# Patient Record
Sex: FEMALE | Race: WHITE | Employment: OTHER | URBAN - METROPOLITAN AREA
[De-identification: names, ages, dates, MRNs, and addresses within clinical notes are randomized per-mention and may not be internally consistent; named-entity substitution may affect disease eponyms.]

---

## 2017-01-03 ENCOUNTER — HOSPITAL ENCOUNTER (OUTPATIENT)
Dept: OTHER | Age: 72
Discharge: HOME | End: 2017-01-03
Attending: FAMILY MEDICINE | Admitting: FAMILY MEDICINE

## 2017-01-03 ENCOUNTER — TELEPHONE (OUTPATIENT)
Dept: FAMILY MEDICINE CLINIC | Age: 72
End: 2017-01-03

## 2017-01-03 ENCOUNTER — OFFICE VISIT (OUTPATIENT)
Dept: FAMILY MEDICINE CLINIC | Age: 72
End: 2017-01-03

## 2017-01-03 VITALS
WEIGHT: 137 LBS | HEIGHT: 64 IN | BODY MASS INDEX: 23.39 KG/M2 | SYSTOLIC BLOOD PRESSURE: 100 MMHG | DIASTOLIC BLOOD PRESSURE: 66 MMHG | TEMPERATURE: 97.7 F

## 2017-01-03 DIAGNOSIS — L65.9 HAIR THINNING: ICD-10-CM

## 2017-01-03 DIAGNOSIS — M19.019 SHOULDER ARTHRITIS: Primary | ICD-10-CM

## 2017-01-03 DIAGNOSIS — E78.2 MIXED HYPERLIPIDEMIA: ICD-10-CM

## 2017-01-03 DIAGNOSIS — Z11.59 NEED FOR HEPATITIS C SCREENING TEST: ICD-10-CM

## 2017-01-03 DIAGNOSIS — M19.019 SHOULDER ARTHRITIS: ICD-10-CM

## 2017-01-03 DIAGNOSIS — I10 ESSENTIAL HYPERTENSION: ICD-10-CM

## 2017-01-03 PROCEDURE — 99214 OFFICE O/P EST MOD 30 MIN: CPT | Performed by: FAMILY MEDICINE

## 2017-01-03 ASSESSMENT — ENCOUNTER SYMPTOMS
EYES NEGATIVE: 1
GASTROINTESTINAL NEGATIVE: 1
RESPIRATORY NEGATIVE: 1

## 2017-01-04 LAB
DHEAS (DHEA SULFATE): 37 UG/DL (ref 13–130)
FOLATE: 6.2 NG/ML (ref 4.78–24.2)
HEPATITIS C ANTIBODY INTERPRETATION: NORMAL
VITAMIN B-12: 1116 PG/ML (ref 211–911)

## 2017-01-23 DIAGNOSIS — E78.2 MIXED HYPERLIPIDEMIA: ICD-10-CM

## 2017-01-23 RX ORDER — FENOFIBRATE 160 MG/1
160 TABLET ORAL DAILY
Qty: 90 TABLET | Refills: 1 | Status: SHIPPED | OUTPATIENT
Start: 2017-01-23 | End: 2017-08-21 | Stop reason: SDUPTHER

## 2017-01-23 RX ORDER — CANAGLIFLOZIN 300 MG/1
TABLET, FILM COATED ORAL
Qty: 90 TABLET | Refills: 0 | Status: SHIPPED | OUTPATIENT
Start: 2017-01-23 | End: 2017-10-13 | Stop reason: ALTCHOICE

## 2017-01-23 RX ORDER — METFORMIN HYDROCHLORIDE 500 MG/1
500 TABLET, EXTENDED RELEASE ORAL 2 TIMES DAILY
Qty: 180 TABLET | Refills: 1 | Status: SHIPPED | OUTPATIENT
Start: 2017-01-23 | End: 2017-08-21 | Stop reason: SDUPTHER

## 2017-03-08 ENCOUNTER — HOSPITAL ENCOUNTER (OUTPATIENT)
Dept: MAMMOGRAPHY | Age: 72
Discharge: HOME | End: 2017-03-08
Attending: FAMILY MEDICINE | Admitting: FAMILY MEDICINE

## 2017-03-08 DIAGNOSIS — Z12.31 VISIT FOR SCREENING MAMMOGRAM: ICD-10-CM

## 2017-03-09 ENCOUNTER — TELEPHONE (OUTPATIENT)
Dept: FAMILY MEDICINE CLINIC | Age: 72
End: 2017-03-09

## 2017-03-09 DIAGNOSIS — R92.8 ABNORMAL MAMMOGRAM OF RIGHT BREAST: Primary | ICD-10-CM

## 2017-03-13 ENCOUNTER — HOSPITAL ENCOUNTER (OUTPATIENT)
Dept: MAMMOGRAPHY | Age: 72
Discharge: HOME | End: 2017-03-13
Attending: FAMILY MEDICINE | Admitting: FAMILY MEDICINE

## 2017-03-13 DIAGNOSIS — R92.8 ABNORMAL MAMMOGRAM OF RIGHT BREAST: ICD-10-CM

## 2017-03-31 ENCOUNTER — TELEPHONE (OUTPATIENT)
Dept: FAMILY MEDICINE CLINIC | Age: 72
End: 2017-03-31

## 2017-03-31 DIAGNOSIS — I10 ESSENTIAL HYPERTENSION: ICD-10-CM

## 2017-04-03 DIAGNOSIS — I10 ESSENTIAL HYPERTENSION: ICD-10-CM

## 2017-04-04 ENCOUNTER — OFFICE VISIT (OUTPATIENT)
Dept: FAMILY MEDICINE CLINIC | Age: 72
End: 2017-04-04

## 2017-04-04 VITALS
DIASTOLIC BLOOD PRESSURE: 58 MMHG | WEIGHT: 136.4 LBS | HEIGHT: 64 IN | OXYGEN SATURATION: 98 % | TEMPERATURE: 97.7 F | BODY MASS INDEX: 23.29 KG/M2 | SYSTOLIC BLOOD PRESSURE: 100 MMHG | HEART RATE: 86 BPM

## 2017-04-04 DIAGNOSIS — I10 ESSENTIAL HYPERTENSION: ICD-10-CM

## 2017-04-04 DIAGNOSIS — E78.2 MIXED HYPERLIPIDEMIA: Primary | ICD-10-CM

## 2017-04-04 LAB
A/G RATIO: 2 (ref 1.1–2.2)
ALBUMIN SERPL-MCNC: 4.2 G/DL (ref 3.4–5)
ALP BLD-CCNC: 49 U/L (ref 40–129)
ALT SERPL-CCNC: 19 U/L (ref 10–40)
ANION GAP SERPL CALCULATED.3IONS-SCNC: 18 MMOL/L (ref 3–16)
AST SERPL-CCNC: 20 U/L (ref 15–37)
BILIRUB SERPL-MCNC: <0.2 MG/DL (ref 0–1)
BILIRUBIN, POC: NORMAL
BLOOD URINE, POC: NORMAL
BUN BLDV-MCNC: 25 MG/DL (ref 7–20)
CALCIUM SERPL-MCNC: 9.6 MG/DL (ref 8.3–10.6)
CHLORIDE BLD-SCNC: 105 MMOL/L (ref 99–110)
CHOLESTEROL, TOTAL: 200 MG/DL (ref 0–199)
CLARITY, POC: CLEAR
CO2: 21 MMOL/L (ref 21–32)
COLOR, POC: YELLOW
CREAT SERPL-MCNC: 0.9 MG/DL (ref 0.6–1.2)
CREATININE URINE POCT: NORMAL
ESTIMATED AVERAGE GLUCOSE: 211.6 MG/DL
GFR AFRICAN AMERICAN: >60
GFR NON-AFRICAN AMERICAN: >60
GLOBULIN: 2.1 G/DL
GLUCOSE BLD-MCNC: 182 MG/DL (ref 70–99)
GLUCOSE URINE, POC: NORMAL
HBA1C MFR BLD: 9 %
HDLC SERPL-MCNC: 48 MG/DL (ref 40–60)
KETONES, POC: NORMAL
LDL CHOLESTEROL CALCULATED: 119 MG/DL
LEUKOCYTE EST, POC: NORMAL
MICROALBUMIN/CREAT 24H UR: NORMAL MG/G{CREAT}
MICROALBUMIN/CREAT UR-RTO: NORMAL
NITRITE, POC: NORMAL
PH, POC: 7.5
POTASSIUM SERPL-SCNC: 4.8 MMOL/L (ref 3.5–5.1)
PROTEIN, POC: NORMAL
SODIUM BLD-SCNC: 144 MMOL/L (ref 136–145)
SPECIFIC GRAVITY, POC: 1.01
TOTAL PROTEIN: 6.3 G/DL (ref 6.4–8.2)
TRIGL SERPL-MCNC: 165 MG/DL (ref 0–150)
UROBILINOGEN, POC: 0.2
VLDLC SERPL CALC-MCNC: 33 MG/DL

## 2017-04-04 PROCEDURE — 3014F SCREEN MAMMO DOC REV: CPT | Performed by: NURSE PRACTITIONER

## 2017-04-04 PROCEDURE — 4040F PNEUMOC VAC/ADMIN/RCVD: CPT | Performed by: NURSE PRACTITIONER

## 2017-04-04 PROCEDURE — 82044 UR ALBUMIN SEMIQUANTITATIVE: CPT | Performed by: NURSE PRACTITIONER

## 2017-04-04 PROCEDURE — 1036F TOBACCO NON-USER: CPT | Performed by: NURSE PRACTITIONER

## 2017-04-04 PROCEDURE — G8400 PT W/DXA NO RESULTS DOC: HCPCS | Performed by: NURSE PRACTITIONER

## 2017-04-04 PROCEDURE — 99214 OFFICE O/P EST MOD 30 MIN: CPT | Performed by: NURSE PRACTITIONER

## 2017-04-04 PROCEDURE — G8420 CALC BMI NORM PARAMETERS: HCPCS | Performed by: NURSE PRACTITIONER

## 2017-04-04 PROCEDURE — 1123F ACP DISCUSS/DSCN MKR DOCD: CPT | Performed by: NURSE PRACTITIONER

## 2017-04-04 PROCEDURE — 81002 URINALYSIS NONAUTO W/O SCOPE: CPT | Performed by: NURSE PRACTITIONER

## 2017-04-04 PROCEDURE — 3017F COLORECTAL CA SCREEN DOC REV: CPT | Performed by: NURSE PRACTITIONER

## 2017-04-04 PROCEDURE — 3045F PR MOST RECENT HEMOGLOBIN A1C LEVEL 7.0-9.0%: CPT | Performed by: NURSE PRACTITIONER

## 2017-04-04 PROCEDURE — 1090F PRES/ABSN URINE INCON ASSESS: CPT | Performed by: NURSE PRACTITIONER

## 2017-04-04 PROCEDURE — G8427 DOCREV CUR MEDS BY ELIG CLIN: HCPCS | Performed by: NURSE PRACTITIONER

## 2017-04-04 RX ORDER — AMPICILLIN TRIHYDRATE 250 MG
CAPSULE ORAL
COMMUNITY
End: 2018-03-14

## 2017-04-04 RX ORDER — LISINOPRIL 30 MG/1
7.5 TABLET ORAL DAILY
Qty: 90 TABLET | Refills: 1 | COMMUNITY
Start: 2017-04-04 | End: 2019-01-21 | Stop reason: SDUPTHER

## 2017-04-04 ASSESSMENT — ENCOUNTER SYMPTOMS
ALLERGIC/IMMUNOLOGIC NEGATIVE: 1
GASTROINTESTINAL NEGATIVE: 1
SHORTNESS OF BREATH: 0
BLURRED VISION: 0
EYES NEGATIVE: 1
ORTHOPNEA: 0
RESPIRATORY NEGATIVE: 1

## 2017-05-25 RX ORDER — PEN NEEDLE, DIABETIC 31 GX3/16"
NEEDLE, DISPOSABLE MISCELLANEOUS
Qty: 100 EACH | Refills: 3 | Status: SHIPPED | OUTPATIENT
Start: 2017-05-25 | End: 2018-08-03 | Stop reason: SDUPTHER

## 2017-07-12 DIAGNOSIS — E78.2 MIXED HYPERLIPIDEMIA: ICD-10-CM

## 2017-07-12 LAB
A/G RATIO: 2 (ref 1.1–2.2)
ALBUMIN SERPL-MCNC: 4.2 G/DL (ref 3.4–5)
ALP BLD-CCNC: 53 U/L (ref 40–129)
ALT SERPL-CCNC: 21 U/L (ref 10–40)
ANION GAP SERPL CALCULATED.3IONS-SCNC: 13 MMOL/L (ref 3–16)
AST SERPL-CCNC: 20 U/L (ref 15–37)
BILIRUB SERPL-MCNC: <0.2 MG/DL (ref 0–1)
BUN BLDV-MCNC: 23 MG/DL (ref 7–20)
CALCIUM SERPL-MCNC: 9.7 MG/DL (ref 8.3–10.6)
CHLORIDE BLD-SCNC: 104 MMOL/L (ref 99–110)
CHOLESTEROL, TOTAL: 187 MG/DL (ref 0–199)
CO2: 24 MMOL/L (ref 21–32)
CREAT SERPL-MCNC: 0.9 MG/DL (ref 0.6–1.2)
GFR AFRICAN AMERICAN: >60
GFR NON-AFRICAN AMERICAN: >60
GLOBULIN: 2.1 G/DL
GLUCOSE BLD-MCNC: 197 MG/DL (ref 70–99)
HDLC SERPL-MCNC: 43 MG/DL (ref 40–60)
LDL CHOLESTEROL CALCULATED: 99 MG/DL
POTASSIUM SERPL-SCNC: 4.8 MMOL/L (ref 3.5–5.1)
SODIUM BLD-SCNC: 141 MMOL/L (ref 136–145)
TOTAL PROTEIN: 6.3 G/DL (ref 6.4–8.2)
TRIGL SERPL-MCNC: 225 MG/DL (ref 0–150)
VLDLC SERPL CALC-MCNC: 45 MG/DL

## 2017-07-13 ENCOUNTER — OFFICE VISIT (OUTPATIENT)
Dept: FAMILY MEDICINE CLINIC | Age: 72
End: 2017-07-13

## 2017-07-13 ENCOUNTER — PATIENT MESSAGE (OUTPATIENT)
Dept: FAMILY MEDICINE CLINIC | Age: 72
End: 2017-07-13

## 2017-07-13 VITALS
TEMPERATURE: 97.6 F | HEIGHT: 64 IN | SYSTOLIC BLOOD PRESSURE: 100 MMHG | WEIGHT: 133.2 LBS | BODY MASS INDEX: 22.74 KG/M2 | DIASTOLIC BLOOD PRESSURE: 62 MMHG

## 2017-07-13 DIAGNOSIS — E78.2 MIXED HYPERLIPIDEMIA: ICD-10-CM

## 2017-07-13 DIAGNOSIS — I10 ESSENTIAL HYPERTENSION: ICD-10-CM

## 2017-07-13 LAB
ESTIMATED AVERAGE GLUCOSE: 214.5 MG/DL
HBA1C MFR BLD: 9.1 %

## 2017-07-13 PROCEDURE — 4040F PNEUMOC VAC/ADMIN/RCVD: CPT | Performed by: FAMILY MEDICINE

## 2017-07-13 PROCEDURE — 3014F SCREEN MAMMO DOC REV: CPT | Performed by: FAMILY MEDICINE

## 2017-07-13 PROCEDURE — G8427 DOCREV CUR MEDS BY ELIG CLIN: HCPCS | Performed by: FAMILY MEDICINE

## 2017-07-13 PROCEDURE — 99214 OFFICE O/P EST MOD 30 MIN: CPT | Performed by: FAMILY MEDICINE

## 2017-07-13 PROCEDURE — 1090F PRES/ABSN URINE INCON ASSESS: CPT | Performed by: FAMILY MEDICINE

## 2017-07-13 PROCEDURE — 3046F HEMOGLOBIN A1C LEVEL >9.0%: CPT | Performed by: FAMILY MEDICINE

## 2017-07-13 PROCEDURE — 1036F TOBACCO NON-USER: CPT | Performed by: FAMILY MEDICINE

## 2017-07-13 PROCEDURE — 3017F COLORECTAL CA SCREEN DOC REV: CPT | Performed by: FAMILY MEDICINE

## 2017-07-13 PROCEDURE — 1123F ACP DISCUSS/DSCN MKR DOCD: CPT | Performed by: FAMILY MEDICINE

## 2017-07-13 PROCEDURE — G8400 PT W/DXA NO RESULTS DOC: HCPCS | Performed by: FAMILY MEDICINE

## 2017-07-13 PROCEDURE — G8420 CALC BMI NORM PARAMETERS: HCPCS | Performed by: FAMILY MEDICINE

## 2017-07-14 ENCOUNTER — TELEPHONE (OUTPATIENT)
Dept: FAMILY MEDICINE CLINIC | Age: 72
End: 2017-07-14

## 2017-07-16 ASSESSMENT — ENCOUNTER SYMPTOMS
RESPIRATORY NEGATIVE: 1
EYES NEGATIVE: 1
GASTROINTESTINAL NEGATIVE: 1

## 2017-07-17 LAB — INSULIN A: <0.4 U/ML (ref 0–0.4)

## 2017-08-21 DIAGNOSIS — E78.2 MIXED HYPERLIPIDEMIA: ICD-10-CM

## 2017-08-21 RX ORDER — METFORMIN HYDROCHLORIDE 500 MG/1
500 TABLET, EXTENDED RELEASE ORAL 2 TIMES DAILY
Qty: 180 TABLET | Refills: 1 | Status: SHIPPED | OUTPATIENT
Start: 2017-08-21 | End: 2018-08-03 | Stop reason: SDUPTHER

## 2017-08-21 RX ORDER — FENOFIBRATE 160 MG/1
160 TABLET ORAL DAILY
Qty: 90 TABLET | Refills: 1 | Status: SHIPPED | OUTPATIENT
Start: 2017-08-21 | End: 2018-08-03 | Stop reason: SDUPTHER

## 2017-10-11 DIAGNOSIS — E78.2 MIXED HYPERLIPIDEMIA: ICD-10-CM

## 2017-10-11 DIAGNOSIS — I10 ESSENTIAL HYPERTENSION: ICD-10-CM

## 2017-10-11 LAB
A/G RATIO: 1.8 (ref 1.1–2.2)
ALBUMIN SERPL-MCNC: 4.1 G/DL (ref 3.4–5)
ALP BLD-CCNC: 49 U/L (ref 40–129)
ALT SERPL-CCNC: 18 U/L (ref 10–40)
ANION GAP SERPL CALCULATED.3IONS-SCNC: 17 MMOL/L (ref 3–16)
AST SERPL-CCNC: 22 U/L (ref 15–37)
BILIRUB SERPL-MCNC: 0.3 MG/DL (ref 0–1)
BUN BLDV-MCNC: 18 MG/DL (ref 7–20)
CALCIUM SERPL-MCNC: 9.9 MG/DL (ref 8.3–10.6)
CHLORIDE BLD-SCNC: 100 MMOL/L (ref 99–110)
CHOLESTEROL, TOTAL: 202 MG/DL (ref 0–199)
CO2: 24 MMOL/L (ref 21–32)
CREAT SERPL-MCNC: 1 MG/DL (ref 0.6–1.2)
GFR AFRICAN AMERICAN: >60
GFR NON-AFRICAN AMERICAN: 55
GLOBULIN: 2.3 G/DL
GLUCOSE BLD-MCNC: 221 MG/DL (ref 70–99)
HDLC SERPL-MCNC: 42 MG/DL (ref 40–60)
LDL CHOLESTEROL CALCULATED: 105 MG/DL
POTASSIUM SERPL-SCNC: 4.8 MMOL/L (ref 3.5–5.1)
SODIUM BLD-SCNC: 141 MMOL/L (ref 136–145)
TOTAL PROTEIN: 6.4 G/DL (ref 6.4–8.2)
TRIGL SERPL-MCNC: 277 MG/DL (ref 0–150)
VLDLC SERPL CALC-MCNC: 55 MG/DL

## 2017-10-12 LAB
ESTIMATED AVERAGE GLUCOSE: 246 MG/DL
HBA1C MFR BLD: 10.2 %

## 2017-10-13 ENCOUNTER — OFFICE VISIT (OUTPATIENT)
Dept: FAMILY MEDICINE CLINIC | Age: 72
End: 2017-10-13

## 2017-10-13 VITALS
DIASTOLIC BLOOD PRESSURE: 82 MMHG | OXYGEN SATURATION: 97 % | HEART RATE: 68 BPM | SYSTOLIC BLOOD PRESSURE: 112 MMHG | BODY MASS INDEX: 23.49 KG/M2 | HEIGHT: 64 IN | TEMPERATURE: 98.8 F | WEIGHT: 137.6 LBS

## 2017-10-13 DIAGNOSIS — Z23 NEED FOR INFLUENZA VACCINATION: ICD-10-CM

## 2017-10-13 DIAGNOSIS — E78.2 MIXED HYPERLIPIDEMIA: Primary | ICD-10-CM

## 2017-10-13 DIAGNOSIS — I10 ESSENTIAL HYPERTENSION: ICD-10-CM

## 2017-10-13 PROCEDURE — 4040F PNEUMOC VAC/ADMIN/RCVD: CPT | Performed by: NURSE PRACTITIONER

## 2017-10-13 PROCEDURE — G8420 CALC BMI NORM PARAMETERS: HCPCS | Performed by: NURSE PRACTITIONER

## 2017-10-13 PROCEDURE — 1123F ACP DISCUSS/DSCN MKR DOCD: CPT | Performed by: NURSE PRACTITIONER

## 2017-10-13 PROCEDURE — 99214 OFFICE O/P EST MOD 30 MIN: CPT | Performed by: NURSE PRACTITIONER

## 2017-10-13 PROCEDURE — 3046F HEMOGLOBIN A1C LEVEL >9.0%: CPT | Performed by: NURSE PRACTITIONER

## 2017-10-13 PROCEDURE — 3017F COLORECTAL CA SCREEN DOC REV: CPT | Performed by: NURSE PRACTITIONER

## 2017-10-13 PROCEDURE — 1036F TOBACCO NON-USER: CPT | Performed by: NURSE PRACTITIONER

## 2017-10-13 PROCEDURE — 1090F PRES/ABSN URINE INCON ASSESS: CPT | Performed by: NURSE PRACTITIONER

## 2017-10-13 PROCEDURE — G8484 FLU IMMUNIZE NO ADMIN: HCPCS | Performed by: NURSE PRACTITIONER

## 2017-10-13 PROCEDURE — G8400 PT W/DXA NO RESULTS DOC: HCPCS | Performed by: NURSE PRACTITIONER

## 2017-10-13 PROCEDURE — 3014F SCREEN MAMMO DOC REV: CPT | Performed by: NURSE PRACTITIONER

## 2017-10-13 PROCEDURE — G8428 CUR MEDS NOT DOCUMENT: HCPCS | Performed by: NURSE PRACTITIONER

## 2017-10-13 RX ORDER — ROSUVASTATIN CALCIUM 40 MG/1
40 TABLET, COATED ORAL EVERY EVENING
Qty: 30 TABLET | Refills: 3 | Status: SHIPPED | OUTPATIENT
Start: 2017-10-13 | End: 2019-01-21 | Stop reason: SDUPTHER

## 2017-10-13 ASSESSMENT — ENCOUNTER SYMPTOMS
ORTHOPNEA: 0
ALLERGIC/IMMUNOLOGIC NEGATIVE: 1
EYES NEGATIVE: 1
BLURRED VISION: 0
GASTROINTESTINAL NEGATIVE: 1
SHORTNESS OF BREATH: 0
RESPIRATORY NEGATIVE: 1

## 2017-10-13 NOTE — PROGRESS NOTES
10/13/17    Herb Arvizu  1945      Chief Complaint   Patient presents with    Diabetes    Hyperlipidemia    Hypertension       Vitals:    10/13/17 1407   BP: 112/82   Pulse: 68   Temp: 98.8 °F (37.1 °C)   SpO2: 97%         Immunization History   Administered Date(s) Administered    Influenza Virus Vaccine 10/05/2010, 10/11/2011, 10/16/2012, 10/14/2013, 09/29/2015    Influenza Whole 02/08/2010    Influenza, High Dose 10/26/2007, 11/04/2008, 09/08/2009, 02/08/2010, 10/03/2016    Pneumococcal 13-valent Conjugate ([id number]) 03/12/2015    Pneumococcal Polysaccharide ([id number]) 10/26/2007, 01/15/2013    Tdap (Boostrix, Adacel) 01/27/2017    Zoster 10/06/2009       Allergies   Allergen Reactions    Benadryl [Diphenhydramine]     Erythromycin Nausea And Vomiting     Outpatient Prescriptions Marked as Taking for the 10/13/17 encounter (Office Visit) with Alejandro Álvarez NP   Medication Sig Dispense Refill    Dulaglutide (TRULICITY) 1.5 NF/1.0PB SOPN Inject 1.5 mg once a week 4 Pen 2    rosuvastatin (CRESTOR) 40 MG tablet Take 1 tablet by mouth every evening 30 tablet 3       Past Medical History:   Diagnosis Date    Anesthesia     hx of reaction to medications can have \"opposite\" effect with sedatives and causes patient to be jittery, agitated. Patient has concern what will be given.      Arthritis     Diabetic eye exam ([facility] [address] 08.24.15    Dr Vani Mccray Diabetic eye exam ([facility] [address] 01/11/2017    Hyperlipidemia     Hypertension     Pneumonia 1996    Type II or unspecified type diabetes mellitus without mention of complication, not stated as uncontrolled      Past Surgical History:   Procedure Laterality Date    CARPAL TUNNEL RELEASE      bilateral    COLONOSCOPY  5/2013    HYSTERECTOMY      partial    MASTECTOMY, PARTIAL Left 07/18/2013    LEFT BREAST NEEDLE LOCALIZED PARTIAL MASTECTOMY EXCISIONAL    OTHER SURGICAL HISTORY      medications for sedation can make her jittery    TUBAL LIGATION has no history of chronic renal disease, hypothyroidism or liver disease. Pertinent negatives include no chest pain or shortness of breath. Current antihyperlipidemic treatment includes statins, fibric acid derivatives and herbal therapy. Hypertension   This is a chronic problem. The current episode started more than 1 year ago. Pertinent negatives include no anxiety, blurred vision, chest pain, headaches, malaise/fatigue, neck pain, orthopnea, palpitations, peripheral edema, PND, shortness of breath or sweats. Past treatments include ACE inhibitors. There is no history of chronic renal disease. Review of Systems   Constitutional: Negative. Negative for malaise/fatigue. HENT: Negative. Eyes: Negative. Negative for blurred vision. Respiratory: Negative. Negative for shortness of breath. Cardiovascular: Negative. Negative for chest pain, palpitations, orthopnea and PND. Gastrointestinal: Negative. Endocrine: Negative. Genitourinary: Negative. Musculoskeletal: Negative. Negative for neck pain. Skin: Negative. Allergic/Immunologic: Negative. Neurological: Negative. Negative for headaches. Hematological: Negative. Psychiatric/Behavioral: Negative. Physical Exam   Constitutional: She is oriented to person, place, and time. She appears well-developed and well-nourished. HENT:   Head: Normocephalic. Cardiovascular: Normal rate, regular rhythm, normal heart sounds and intact distal pulses. Pulmonary/Chest: Effort normal and breath sounds normal. No respiratory distress. She has no wheezes. She has no rales. Abdominal: Soft. Bowel sounds are normal. She exhibits no distension. There is no tenderness. There is no rebound. Neurological: She is alert and oriented to person, place, and time. Skin: Skin is warm and dry. Psychiatric: She has a normal mood and affect.  Her behavior is normal. Judgment and thought content normal.         1. Mixed hyperlipidemia Condition stable continue the medications, treatments, will check labs as appropriate       The patient received counseling on the following healthy behaviors: nutrition, exercise, medication adherence and decrease in alcohol consumption    Patient given educational materials on Hyperlipidemia and Nutrition if appropriate    I have instructed the patient to complete a self tracking handout on diet and instructed them to bring it with them to the  next appointment. Discussed use, benefit, and side effects of prescribed medications. Barriers to medication compliance addressed. All patient questions answered. Pt voiced understanding. rosuvastatin (CRESTOR) 40 MG tablet    Lipid Panel    Comprehensive Metabolic Panel   2. Uncontrolled diabetes mellitus type 2 without complications, unspecified long term insulin use status (HCC)  Condition stable continue the medications, treatments, will check labs as appropriate         The patient received counseling on the following healthy behaviors: nutrition, exercise, medication adherence and diabetes control. Patient given educational materials on Diabetes as appropriate. I have instructed the patient to complete a self tracking handout on Blood Sugars  and instructed them to bring it with them to the next appointment. Discussed use, benefit, and side effects of prescribed medications. Barriers to medication compliance addressed. All patient questions answered. Pt voiced understanding. Dulaglutide (TRULICITY) 1.5 DH/3.4QG SOPN    Hemoglobin A1C   3. Essential hypertension  Condition stable continue the medications, treatments, will check labs as appropriate       The patient received counseling on the following healthy behaviors: nutrition, exercise, medication adherence and decrease in alcohol consumption    Patient given educational materials on Nutrition, Exercise and Hypertension as appropriate.     I have instructed the patient to complete a self tracking handout on Blood Pressures  and instructed them to bring it with them to the next appointment. Discussed use, benefit, and side effects of prescribed medications. Barriers to medication compliance addressed. All patient questions answered. Pt voiced understanding. BP Readings from Last 2 Encounters:   10/13/17 112/82   07/13/17 100/62     Hemoglobin A1C (%)   Date Value   10/11/2017 10.2     LDL Calculated (mg/dL)   Date Value   10/11/2017 105 (H)              Tobacco use:  Patient  reports that she quit smoking about 19 months ago. Her smoking use included Cigarettes. She has a 12.75 pack-year smoking history. She has never used smokeless tobacco.    If Smoker - Cessation materials given? NA    Is Daily aspirin on medication list?:  No    Diabetic retinal exam done? No   If yes, document in Health Maintenance. Monofilament placed on counter? No    Shoes and socks removed? No    BP taken with correct size cuff? Yes   Repeated if > 130/80 NA     Microalbumin performed if applicable? NA     Patient Self-Management Goal for Chronic Condition  Goal: I will follow the diet recommendations provided by my doctor: low fat, low cholesterol, substitute healthy fats, such as olive oil, canola oil, grapeseed oil for saturated fats like butter, margarine, and shortening, reduce sodium intake and minimize simple sugars. I will take all medications as prescribed by my doctor, and I will call the office if I am having any medication problems.   Barriers to success: none  Plan for overcoming my barriers: N/A     Confidence: 9/10  Date goal set: 10/13/17  Date goal attained:     Radha Hanna NP

## 2018-01-03 RX ORDER — DULAGLUTIDE 1.5 MG/.5ML
INJECTION, SOLUTION SUBCUTANEOUS
Qty: 2 ML | Refills: 2 | Status: SHIPPED | OUTPATIENT
Start: 2018-01-03 | End: 2018-03-14

## 2018-01-11 DIAGNOSIS — E78.2 MIXED HYPERLIPIDEMIA: ICD-10-CM

## 2018-01-11 LAB
A/G RATIO: 2.4 (ref 1.1–2.2)
ALBUMIN SERPL-MCNC: 4.3 G/DL (ref 3.4–5)
ALP BLD-CCNC: 42 U/L (ref 40–129)
ALT SERPL-CCNC: 14 U/L (ref 10–40)
ANION GAP SERPL CALCULATED.3IONS-SCNC: 13 MMOL/L (ref 3–16)
AST SERPL-CCNC: 20 U/L (ref 15–37)
BILIRUB SERPL-MCNC: 0.3 MG/DL (ref 0–1)
BUN BLDV-MCNC: 17 MG/DL (ref 7–20)
CALCIUM SERPL-MCNC: 9.8 MG/DL (ref 8.3–10.6)
CHLORIDE BLD-SCNC: 106 MMOL/L (ref 99–110)
CHOLESTEROL, TOTAL: 116 MG/DL (ref 0–199)
CO2: 26 MMOL/L (ref 21–32)
CREAT SERPL-MCNC: 0.9 MG/DL (ref 0.6–1.2)
GFR AFRICAN AMERICAN: >60
GFR NON-AFRICAN AMERICAN: >60
GLOBULIN: 1.8 G/DL
GLUCOSE BLD-MCNC: 175 MG/DL (ref 70–99)
HDLC SERPL-MCNC: 43 MG/DL (ref 40–60)
LDL CHOLESTEROL CALCULATED: 36 MG/DL
POTASSIUM SERPL-SCNC: 4.8 MMOL/L (ref 3.5–5.1)
SODIUM BLD-SCNC: 145 MMOL/L (ref 136–145)
TOTAL PROTEIN: 6.1 G/DL (ref 6.4–8.2)
TRIGL SERPL-MCNC: 184 MG/DL (ref 0–150)
VLDLC SERPL CALC-MCNC: 37 MG/DL

## 2018-01-12 ENCOUNTER — TELEPHONE (OUTPATIENT)
Dept: FAMILY MEDICINE CLINIC | Age: 73
End: 2018-01-12

## 2018-01-12 ENCOUNTER — OFFICE VISIT (OUTPATIENT)
Dept: FAMILY MEDICINE CLINIC | Age: 73
End: 2018-01-12

## 2018-01-12 VITALS
BODY MASS INDEX: 22.71 KG/M2 | SYSTOLIC BLOOD PRESSURE: 92 MMHG | HEIGHT: 64 IN | DIASTOLIC BLOOD PRESSURE: 56 MMHG | WEIGHT: 133 LBS

## 2018-01-12 DIAGNOSIS — L65.8 FEMALE PATTERN HAIR LOSS: ICD-10-CM

## 2018-01-12 DIAGNOSIS — S46.011A STRAIN OF RIGHT ROTATOR CUFF CAPSULE, INITIAL ENCOUNTER: ICD-10-CM

## 2018-01-12 DIAGNOSIS — L65.8 FEMALE PATTERN HAIR LOSS: Primary | ICD-10-CM

## 2018-01-12 DIAGNOSIS — I10 ESSENTIAL HYPERTENSION: ICD-10-CM

## 2018-01-12 LAB
ESTIMATED AVERAGE GLUCOSE: 234.6 MG/DL
HBA1C MFR BLD: 9.8 %
T4 FREE: 1.4 NG/DL (ref 0.9–1.8)
TSH SERPL DL<=0.05 MIU/L-ACNC: 1.04 UIU/ML (ref 0.27–4.2)

## 2018-01-12 PROCEDURE — 1123F ACP DISCUSS/DSCN MKR DOCD: CPT | Performed by: FAMILY MEDICINE

## 2018-01-12 PROCEDURE — 99214 OFFICE O/P EST MOD 30 MIN: CPT | Performed by: FAMILY MEDICINE

## 2018-01-12 PROCEDURE — 1036F TOBACCO NON-USER: CPT | Performed by: FAMILY MEDICINE

## 2018-01-12 PROCEDURE — 1090F PRES/ABSN URINE INCON ASSESS: CPT | Performed by: FAMILY MEDICINE

## 2018-01-12 PROCEDURE — 3017F COLORECTAL CA SCREEN DOC REV: CPT | Performed by: FAMILY MEDICINE

## 2018-01-12 PROCEDURE — 3046F HEMOGLOBIN A1C LEVEL >9.0%: CPT | Performed by: FAMILY MEDICINE

## 2018-01-12 PROCEDURE — 3014F SCREEN MAMMO DOC REV: CPT | Performed by: FAMILY MEDICINE

## 2018-01-12 PROCEDURE — G8427 DOCREV CUR MEDS BY ELIG CLIN: HCPCS | Performed by: FAMILY MEDICINE

## 2018-01-12 PROCEDURE — G8510 SCR DEP NEG, NO PLAN REQD: HCPCS | Performed by: FAMILY MEDICINE

## 2018-01-12 PROCEDURE — 4040F PNEUMOC VAC/ADMIN/RCVD: CPT | Performed by: FAMILY MEDICINE

## 2018-01-12 PROCEDURE — G8420 CALC BMI NORM PARAMETERS: HCPCS | Performed by: FAMILY MEDICINE

## 2018-01-12 PROCEDURE — 3288F FALL RISK ASSESSMENT DOCD: CPT | Performed by: FAMILY MEDICINE

## 2018-01-12 PROCEDURE — G8484 FLU IMMUNIZE NO ADMIN: HCPCS | Performed by: FAMILY MEDICINE

## 2018-01-12 PROCEDURE — G8400 PT W/DXA NO RESULTS DOC: HCPCS | Performed by: FAMILY MEDICINE

## 2018-01-12 ASSESSMENT — PATIENT HEALTH QUESTIONNAIRE - PHQ9
1. LITTLE INTEREST OR PLEASURE IN DOING THINGS: 0
SUM OF ALL RESPONSES TO PHQ QUESTIONS 1-9: 0
SUM OF ALL RESPONSES TO PHQ9 QUESTIONS 1 & 2: 0
2. FEELING DOWN, DEPRESSED OR HOPELESS: 0

## 2018-01-14 ASSESSMENT — ENCOUNTER SYMPTOMS
EYES NEGATIVE: 1
GASTROINTESTINAL NEGATIVE: 1
RESPIRATORY NEGATIVE: 1

## 2018-01-14 NOTE — PROGRESS NOTES
medication compliance addressed. All patient questions answered. Pt voiced understanding. 4. Uncontrolled type 2 diabetes mellitus without complication, without long-term current use of insulin (HCC)  Condition stable continue the medications, treatments, will check labs as appropriate         The patient received counseling on the following healthy behaviors: nutrition, exercise, medication adherence and diabetes control. Patient given educational materials on Diabetes as appropriate. I have instructed the patient to complete a self tracking handout on Blood Sugars  and instructed them to bring it with them to the next appointment. Discussed use, benefit, and side effects of prescribed medications. Barriers to medication compliance addressed. All patient questions answered. Pt voiced understanding.    Slight better control refuses to start insulin                 Jocelyn St MD

## 2018-01-17 LAB
DHEAS (DHEA SULFATE): 20 UG/DL (ref 10–90)
SEX HORMONE BINDING GLOBULIN: 70 NMOL/L (ref 30–135)
TESTOSTERONE FREE-NONMALE: ABNORMAL PG/ML (ref 0.6–3.8)
TESTOSTERONE TOTAL: <3 NG/DL (ref 20–70)

## 2018-01-18 DIAGNOSIS — E34.9 HYPOTESTOSTERONEMIA: Primary | ICD-10-CM

## 2018-01-18 DIAGNOSIS — L65.9 ALOPECIA: ICD-10-CM

## 2018-03-14 ENCOUNTER — OFFICE VISIT (OUTPATIENT)
Dept: ENDOCRINOLOGY | Age: 73
End: 2018-03-14

## 2018-03-14 VITALS
HEART RATE: 74 BPM | RESPIRATION RATE: 16 BRPM | OXYGEN SATURATION: 99 % | HEIGHT: 64 IN | BODY MASS INDEX: 22.84 KG/M2 | DIASTOLIC BLOOD PRESSURE: 59 MMHG | SYSTOLIC BLOOD PRESSURE: 94 MMHG | WEIGHT: 133.8 LBS

## 2018-03-14 DIAGNOSIS — I10 ESSENTIAL HYPERTENSION: ICD-10-CM

## 2018-03-14 DIAGNOSIS — L65.9 HAIR LOSS: ICD-10-CM

## 2018-03-14 LAB — HBA1C MFR BLD: 9 %

## 2018-03-14 PROCEDURE — 3045F PR MOST RECENT HEMOGLOBIN A1C LEVEL 7.0-9.0%: CPT | Performed by: INTERNAL MEDICINE

## 2018-03-14 PROCEDURE — 3014F SCREEN MAMMO DOC REV: CPT | Performed by: INTERNAL MEDICINE

## 2018-03-14 PROCEDURE — 1090F PRES/ABSN URINE INCON ASSESS: CPT | Performed by: INTERNAL MEDICINE

## 2018-03-14 PROCEDURE — 99205 OFFICE O/P NEW HI 60 MIN: CPT | Performed by: INTERNAL MEDICINE

## 2018-03-14 PROCEDURE — 3017F COLORECTAL CA SCREEN DOC REV: CPT | Performed by: INTERNAL MEDICINE

## 2018-03-14 PROCEDURE — 1036F TOBACCO NON-USER: CPT | Performed by: INTERNAL MEDICINE

## 2018-03-14 PROCEDURE — G8482 FLU IMMUNIZE ORDER/ADMIN: HCPCS | Performed by: INTERNAL MEDICINE

## 2018-03-14 PROCEDURE — 4040F PNEUMOC VAC/ADMIN/RCVD: CPT | Performed by: INTERNAL MEDICINE

## 2018-03-14 PROCEDURE — G8400 PT W/DXA NO RESULTS DOC: HCPCS | Performed by: INTERNAL MEDICINE

## 2018-03-14 PROCEDURE — G8420 CALC BMI NORM PARAMETERS: HCPCS | Performed by: INTERNAL MEDICINE

## 2018-03-14 PROCEDURE — 83036 HEMOGLOBIN GLYCOSYLATED A1C: CPT | Performed by: INTERNAL MEDICINE

## 2018-03-14 PROCEDURE — 1123F ACP DISCUSS/DSCN MKR DOCD: CPT | Performed by: INTERNAL MEDICINE

## 2018-03-14 PROCEDURE — G8427 DOCREV CUR MEDS BY ELIG CLIN: HCPCS | Performed by: INTERNAL MEDICINE

## 2018-03-14 NOTE — LETTER
[address] [facility]  Phone: [phone]  Fax: [phone]    Blayne Dougherty MD        March 14, 2018     Cipriano Cortes MD  [address] [place] [address]    Patient: Edith Soto  MR Number: [id number]  YOB: 1945  Date of Visit: 3/14/2018    Dear Dr. Kathy Doss [facility]:    Thank you for the request for consultation for Mary Kay Erickson to me for the evaluation of diabetes. Below are the relevant portions of my assessment and plan of care. Assessment:     Edith Soto is a 81 y.o. female with :    1.T2DM: Longstanding, uncontrolled, provided education. Discussed with patient given hyperglycemia, A1c, duration of DM, recommend basal insulin. She was hesitant but agreed after discussion. Will stop trulicity as taking victoza also. 2. HTN : BP at goal  3. HLD: LDL at goal  4. Hair loss : DHEA-S, TSH nl. Testosterone low, discussed it is unlikely cause of her hair loss. Advised to see dermatology to rule out any other causes. Plan:      Start lantus 12 units daily, advised self titration   Stop januvia, trulicity, continue victoza   Continue metformin   Advise to check blood sugar 1 times a day   Patient to send blood sugar log for titration. Advise to exercise regularly. Advise to low simple carbohydrate and protein with each  meal diet. Diabetes Care: recommend yearly eye exam, foot exam and urine microalbumin to   creatinine ratio. Patient is up-to-date. If you have questions, please do not hesitate to call me. I look forward to following Kali Jean Baptiste along with you.     Sincerely,          Blayne Dougherty MD

## 2018-03-14 NOTE — PROGRESS NOTES
Seen as new patient for diabetes/ hair loss    Diagnosed with Type 2 diabetes mellitus in 2006  Known diabetic complications: Neuropathy    Current diabetic medications   Metformin 500mg BID  Jardiance  25mg  Januvia  100mg  Victoza  1.8  Trulicity  1.5    h/o use of sulfonylurea/ TZD     she refused insulin    Last A1c  9%<------9.8 on 1/18 <--- 10.2 <--- 9.1    Prior visit with dietician: Yes   Current diet: on average, 3 meals per day   Current exercise: walking   Current monitoring regimen: home blood tests -1  times daily     Has brought blood glucose log/meter: No   Home blood sugar records: high 100s to low 200s  Any episodes of hypoglycemia? -    No Hx of CAD , PVD, CVA    Hyperlipidemia: LDL 36 on 1/18   crestor 40mg fenofibrate 160mg  Last eye exam: 1/18  Last foot exam: 3/18  Last microalbumin to creatinine ratio: 4/17  ACE-I or ARB: lisinopril 15mg    States hair loss for last 2 years    1/18 Testosterone < 3  DHEA-S 20  TSH 1.04    Calcium high in the past but normal since 2011 1/18 Ca 9.8  7/11  Ca 11.0  6/09 Ca 10.9  Past Medical History:   Diagnosis Date    Anesthesia     hx of reaction to medications can have \"opposite\" effect with sedatives and causes patient to be jittery, agitated. Patient has concern what will be given.      Arthritis     Diabetic eye exam ([facility] [address] 08.24.15    Dr Mandi Daley Diabetic eye exam ([facility] [address] 01/11/2017    Hyperlipidemia     Hypertension     Pneumonia 1996    Type II or unspecified type diabetes mellitus without mention of complication, not stated as uncontrolled      Past Surgical History:   Procedure Laterality Date    CARPAL TUNNEL RELEASE      bilateral    COLONOSCOPY  5/2013    HYSTERECTOMY      partial    MASTECTOMY, PARTIAL Left 07/18/2013    LEFT BREAST NEEDLE LOCALIZED PARTIAL MASTECTOMY EXCISIONAL    OTHER SURGICAL HISTORY      medications for sedation can make her jittery    TUBAL LIGATION       Current Outpatient Prescriptions   Medication Sig Dispense Refill    TRULICITY 1.5 UF/6.6JN SOPN INJECT 1.5 MG ONCE WEEKLY 2 mL 2    DONALDO BREEZE 2 TEST DISK USE TO TEST BLOOD SUGAR ONCE A DAY. DX CODE 250.02 90 each 3    rosuvastatin (CRESTOR) 40 MG tablet Take 1 tablet by mouth every evening 30 tablet 3    Liraglutide (VICTOZA) 18 MG/3ML SOPN SC injection Inject 1.8 mg into the skin daily 9 mL 6    fenofibrate 160 MG tablet Take 1 tablet by mouth daily 90 tablet 1    metFORMIN (GLUCOPHAGE-XR) 500 MG extended release tablet Take 1 tablet by mouth 2 times daily 180 tablet 1    SITagliptin (JANUVIA) 100 MG tablet Take 1 tablet by mouth daily 90 tablet 1    empagliflozin (JARDIANCE) 25 MG tablet Take 25 mg by mouth daily 30 tablet 3    SURE COMFORT PEN NEEDLES 31G X 5 MM MISC USE DAILY FOR VICTOZA PEN AS DIRECTED 100 each 3    lisinopril (PRINIVIL;ZESTRIL) 30 MG tablet Take 0.5 tablets by mouth daily 90 tablet 1    Glucosamine-Chondroit-Vit C-Mn (GLUCOSAMINE 1500 COMPLEX PO) Take by mouth      melatonin (RA MELATONIN) 3 MG TABS tablet Take 1 tablet by mouth daily. 3    timolol (TIMOPTIC-XR) 0.5 % ophthalmic gel-forming every morning.  Cholecalciferol (VITAMIN D3) 400 UNIT/ML LIQD Take 5,000 Int'l Units by mouth daily.  Bimatoprost (LUMIGAN) 0.01 % SOLN Apply  to eye nightly. No current facility-administered medications for this visit.         Review of Systems  Please see scanned document dated and signed      Objective:      BP (!) 94/59 (Site: Left Arm, Position: Sitting, Cuff Size: Medium Adult)   Pulse 74   Resp 16   Ht 5' 4\" (1.626 m)   Wt 133 lb 12.8 oz (60.7 kg)   LMP  (LMP Unknown)   SpO2 99%   BMI 22.97 kg/m²   Wt Readings from Last 3 Encounters:   03/14/18 133 lb 12.8 oz (60.7 kg)   01/12/18 133 lb (60.3 kg)   10/13/17 137 lb 9.6 oz (62.4 kg)     Constitutional: Well-developed, alert, appears stated age, cooperative, in no acute distress  H/E/N/M/T:atraumatic, normocephalic, external ears, nose, lips normal without

## 2018-04-12 ENCOUNTER — OFFICE VISIT (OUTPATIENT)
Dept: FAMILY MEDICINE CLINIC | Age: 73
End: 2018-04-12

## 2018-04-12 VITALS
OXYGEN SATURATION: 96 % | TEMPERATURE: 98.7 F | HEIGHT: 64 IN | BODY MASS INDEX: 22.88 KG/M2 | DIASTOLIC BLOOD PRESSURE: 60 MMHG | SYSTOLIC BLOOD PRESSURE: 110 MMHG | HEART RATE: 75 BPM | WEIGHT: 134 LBS

## 2018-04-12 DIAGNOSIS — E78.2 MIXED HYPERLIPIDEMIA: ICD-10-CM

## 2018-04-12 DIAGNOSIS — E78.2 MIXED HYPERLIPIDEMIA: Primary | ICD-10-CM

## 2018-04-12 DIAGNOSIS — I10 ESSENTIAL HYPERTENSION: ICD-10-CM

## 2018-04-12 LAB
BILIRUBIN, POC: NORMAL
BLOOD URINE, POC: NORMAL
CLARITY, POC: CLEAR
COLOR, POC: YELLOW
CREATININE URINE POCT: NORMAL
GLUCOSE URINE, POC: NORMAL
KETONES, POC: NORMAL
LEUKOCYTE EST, POC: NORMAL
MICROALBUMIN/CREAT 24H UR: 20 MG/G{CREAT}
MICROALBUMIN/CREAT UR-RTO: NORMAL
NITRITE, POC: NORMAL
PH, POC: 85
PROTEIN, POC: NORMAL
SPECIFIC GRAVITY, POC: 1.01
UROBILINOGEN, POC: 0.2

## 2018-04-12 ASSESSMENT — ENCOUNTER SYMPTOMS
ORTHOPNEA: 0
RESPIRATORY NEGATIVE: 1
BLURRED VISION: 0
GASTROINTESTINAL NEGATIVE: 1
SHORTNESS OF BREATH: 0
ALLERGIC/IMMUNOLOGIC NEGATIVE: 1
EYES NEGATIVE: 1

## 2018-04-13 LAB
ALBUMIN SERPL-MCNC: 4.4 G/DL (ref 3.4–5)
ANION GAP SERPL CALCULATED.3IONS-SCNC: 15 MMOL/L (ref 3–16)
BUN BLDV-MCNC: 21 MG/DL (ref 7–20)
CALCIUM SERPL-MCNC: 9.3 MG/DL (ref 8.3–10.6)
CHLORIDE BLD-SCNC: 105 MMOL/L (ref 99–110)
CHOLESTEROL, TOTAL: 125 MG/DL (ref 0–199)
CO2: 26 MMOL/L (ref 21–32)
CREAT SERPL-MCNC: 1 MG/DL (ref 0.6–1.2)
ESTIMATED AVERAGE GLUCOSE: 217.3 MG/DL
GFR AFRICAN AMERICAN: >60
GFR NON-AFRICAN AMERICAN: 55
GLUCOSE BLD-MCNC: 159 MG/DL (ref 70–99)
HBA1C MFR BLD: 9.2 %
HDLC SERPL-MCNC: 49 MG/DL (ref 40–60)
LDL CHOLESTEROL CALCULATED: 53 MG/DL
PHOSPHORUS: 4.5 MG/DL (ref 2.5–4.9)
POTASSIUM SERPL-SCNC: 4.6 MMOL/L (ref 3.5–5.1)
SODIUM BLD-SCNC: 146 MMOL/L (ref 136–145)
TRIGL SERPL-MCNC: 114 MG/DL (ref 0–150)
VLDLC SERPL CALC-MCNC: 23 MG/DL

## 2018-06-01 RX ORDER — LANCETS 30 GAUGE
EACH MISCELLANEOUS
Qty: 100 EACH | Refills: 3 | Status: SHIPPED | OUTPATIENT
Start: 2018-06-01

## 2018-06-01 RX ORDER — GLUCOSAMINE HCL/CHONDROITIN SU 500-400 MG
CAPSULE ORAL
Qty: 100 STRIP | Refills: 3 | Status: SHIPPED | OUTPATIENT
Start: 2018-06-01 | End: 2019-07-09 | Stop reason: SDUPTHER

## 2018-06-29 ENCOUNTER — HOSPITAL ENCOUNTER (OUTPATIENT)
Dept: MAMMOGRAPHY | Age: 73
Discharge: HOME | End: 2018-06-29
Attending: OBSTETRICS & GYNECOLOGY | Admitting: OBSTETRICS & GYNECOLOGY

## 2018-06-29 DIAGNOSIS — Z12.31 VISIT FOR SCREENING MAMMOGRAM: ICD-10-CM

## 2018-07-02 ENCOUNTER — OFFICE VISIT (OUTPATIENT)
Dept: ENDOCRINOLOGY | Age: 73
End: 2018-07-02

## 2018-07-02 VITALS
HEIGHT: 64 IN | RESPIRATION RATE: 16 BRPM | BODY MASS INDEX: 23.35 KG/M2 | OXYGEN SATURATION: 96 % | WEIGHT: 136.8 LBS | SYSTOLIC BLOOD PRESSURE: 93 MMHG | DIASTOLIC BLOOD PRESSURE: 55 MMHG | HEART RATE: 80 BPM

## 2018-07-02 DIAGNOSIS — I10 ESSENTIAL HYPERTENSION: ICD-10-CM

## 2018-07-02 LAB — HBA1C MFR BLD: 7.6 %

## 2018-07-02 PROCEDURE — 83036 HEMOGLOBIN GLYCOSYLATED A1C: CPT | Performed by: INTERNAL MEDICINE

## 2018-07-02 PROCEDURE — 3017F COLORECTAL CA SCREEN DOC REV: CPT | Performed by: INTERNAL MEDICINE

## 2018-07-02 PROCEDURE — 1123F ACP DISCUSS/DSCN MKR DOCD: CPT | Performed by: INTERNAL MEDICINE

## 2018-07-02 PROCEDURE — G8420 CALC BMI NORM PARAMETERS: HCPCS | Performed by: INTERNAL MEDICINE

## 2018-07-02 PROCEDURE — G8400 PT W/DXA NO RESULTS DOC: HCPCS | Performed by: INTERNAL MEDICINE

## 2018-07-02 PROCEDURE — 1036F TOBACCO NON-USER: CPT | Performed by: INTERNAL MEDICINE

## 2018-07-02 PROCEDURE — 4040F PNEUMOC VAC/ADMIN/RCVD: CPT | Performed by: INTERNAL MEDICINE

## 2018-07-02 PROCEDURE — 99214 OFFICE O/P EST MOD 30 MIN: CPT | Performed by: INTERNAL MEDICINE

## 2018-07-02 PROCEDURE — G8427 DOCREV CUR MEDS BY ELIG CLIN: HCPCS | Performed by: INTERNAL MEDICINE

## 2018-07-02 PROCEDURE — 3045F PR MOST RECENT HEMOGLOBIN A1C LEVEL 7.0-9.0%: CPT | Performed by: INTERNAL MEDICINE

## 2018-07-02 PROCEDURE — 1090F PRES/ABSN URINE INCON ASSESS: CPT | Performed by: INTERNAL MEDICINE

## 2018-07-02 PROCEDURE — 2022F DILAT RTA XM EVC RTNOPTHY: CPT | Performed by: INTERNAL MEDICINE

## 2018-07-02 NOTE — PROGRESS NOTES
Seen as f/u    Diagnosed with Type 2 diabetes mellitus in 2006  Known diabetic complications: Neuropathy    Current diabetic medications   Metformin 500mg BID  Jardiance  25mg  Victoza  1.8  Lantus 12 units  taking 30-34 units    h/o use of sulfonylurea/ TZD     she refused insulin    Last A1c 7.6%<------- 9%<------9.8 on 1/18 <--- 10.2 <--- 9.1    Prior visit with dietician: Yes   Current diet: on average, 3 meals per day   Current exercise: walking   Current monitoring regimen: home blood tests -1  times daily     Has brought blood glucose log/meter: No   Home blood sugar records:   Any episodes of hypoglycemia? -    No Hx of CAD , PVD, CVA    Hyperlipidemia: LDL 36 on 1/18   crestor 40mg fenofibrate 160mg  Last eye exam: 1/18  Last foot exam: 3/18  Last microalbumin to creatinine ratio: 4/18  ACE-I or ARB: lisinopril 15mg    States hair loss for last 2 years    1/18 Testosterone < 3  DHEA-S 20  TSH 1.04    Calcium high in the past but normal since 2011 1/18 Ca 9.8  7/11  Ca 11.0  6/09 Ca 10.9  Past Medical History:   Diagnosis Date    Anesthesia     hx of reaction to medications can have \"opposite\" effect with sedatives and causes patient to be jittery, agitated. Patient has concern what will be given.      Arthritis     Diabetic eye exam ([facility] [address] 08.24.15    Dr Danna Jain Diabetic eye exam ([facility] [address] 01/11/2017    Hyperlipidemia     Hypertension     Pneumonia 1996    Type II or unspecified type diabetes mellitus without mention of complication, not stated as uncontrolled      Past Surgical History:   Procedure Laterality Date    CARPAL TUNNEL RELEASE      bilateral    COLONOSCOPY  5/2013    HYSTERECTOMY      partial    MASTECTOMY, PARTIAL Left 07/18/2013    LEFT BREAST NEEDLE LOCALIZED PARTIAL MASTECTOMY EXCISIONAL    OTHER SURGICAL HISTORY      medications for sedation can make her jittery    TUBAL LIGATION       Current Outpatient Prescriptions   Medication Sig Dispense Refill    Blood Glucose Monitoring Suppl MARIA ELENA Use to check sugar daily MAY SUBSTITUTE 1 Device 3    Glucose Blood (BLOOD GLUCOSE TEST STRIPS) STRP Use to check sugar  Once daily MAY SUBSTITUTE PER INSURANCE 100 strip 3    Lancets MISC Use to check sugar once daily MAY SUBSTITUTE PER INSURANCE 100 each 3    insulin glargine (LANTUS SOLOSTAR) 100 UNIT/ML injection pen 12 units daily (Patient taking differently: 32 Units 12 units daily) 5 pen 3    Insulin Pen Needle 32G X 4 MM MISC 1 each by Does not apply route daily 100 each 3    rosuvastatin (CRESTOR) 40 MG tablet Take 1 tablet by mouth every evening 30 tablet 3    Liraglutide (VICTOZA) 18 MG/3ML SOPN SC injection Inject 1.8 mg into the skin daily 9 mL 6    fenofibrate 160 MG tablet Take 1 tablet by mouth daily 90 tablet 1    metFORMIN (GLUCOPHAGE-XR) 500 MG extended release tablet Take 1 tablet by mouth 2 times daily 180 tablet 1    empagliflozin (JARDIANCE) 25 MG tablet Take 25 mg by mouth daily 30 tablet 3    SURE COMFORT PEN NEEDLES 31G X 5 MM MISC USE DAILY FOR VICTOZA PEN AS DIRECTED 100 each 3    lisinopril (PRINIVIL;ZESTRIL) 30 MG tablet Take 0.5 tablets by mouth daily 90 tablet 1    Glucosamine-Chondroit-Vit C-Mn (GLUCOSAMINE 1500 COMPLEX PO) Take by mouth      melatonin (RA MELATONIN) 3 MG TABS tablet Take 1 tablet by mouth daily. 3    timolol (TIMOPTIC-XR) 0.5 % ophthalmic gel-forming every morning.  Cholecalciferol (VITAMIN D3) 400 UNIT/ML LIQD Take 5,000 Int'l Units by mouth daily.  Bimatoprost (LUMIGAN) 0.01 % SOLN Apply  to eye nightly. No current facility-administered medications for this visit. Review of Systems  Please see scanned document dated and signed      Objective:      BP (!) 93/55 (Site: Right Arm, Position: Sitting, Cuff Size: Medium Adult)   Pulse 80   Resp 16   Ht 5' 4\" (1.626 m)   Wt 136 lb 12.8 oz (62.1 kg)   LMP  (LMP Unknown)   SpO2 96%   Breastfeeding?  No   BMI 23.48 kg/m²   Wt Readings from Last 3 Encounters: 07/02/18 136 lb 12.8 oz (62.1 kg)   04/12/18 134 lb (60.8 kg)   03/14/18 133 lb 12.8 oz (60.7 kg)     Constitutional: Well-developed, alert, appears stated age, cooperative, in no acute distress  H/E/N/M/T:atraumatic, normocephalic, external ears, nose, lips normal without lesions  Eyes: Arcus Senilis is not present, extraocular muscles are intact  Respiratory: breathing is unlabored, lungs are clear to auscultations. Skeletal muscular: no kyphosis, no gross abnormalities  Skin: Xanthoma/Xanthelasmas are  not present  Generalized thinning  Psychiatric: Judgement and Insight:  judgement and insight appear normal  Neuro: Normal without focal findings, speech is spontaneous, and movements are coordinated, alert and oriented x3     3/18  Skeletal foot exam is normal, no skin lesions, toenails are normal, pulses are normal, 10 g monofilament is 10/10 , 128 Hz vibration sense is diminished bilaterally. Lab Reviewed   No components found for: CHLPL  Lab Results   Component Value Date    TRIG 114 04/12/2018    TRIG 184 (H) 01/11/2018    TRIG 277 (H) 10/11/2017     Lab Results   Component Value Date    HDL 49 04/12/2018    HDL 43 01/11/2018    HDL 42 10/11/2017     Lab Results   Component Value Date    LDLCALC 53 04/12/2018    LDLCALC 36 01/11/2018    LDLCALC 105 (H) 10/11/2017     Lab Results   Component Value Date    LABVLDL 23 04/12/2018    LABVLDL 37 01/11/2018    LABVLDL 55 10/11/2017     Lab Results   Component Value Date    LABA1C 9.2 04/12/2018       Assessment:     Char Salmon is a 81 y.o. female with :    1.T2DM: Longstanding, uncontrolled, provided education. Discussed with patient given hyperglycemia, A1c, duration of DM, recommend basal insulin. She was hesitant but agreed after discussion. A1c improved. Reviewed log, fasting near goal, will advise to take lantus 32 units and SSI. 2.HTN : BP at goal  3. HLD: LDL at goal  4. Hair loss : DHEA-S, TSH nl. Testosterone low, discussed it is unlikely cause of

## 2018-07-03 ENCOUNTER — TELEPHONE (OUTPATIENT)
Dept: FAMILY MEDICINE CLINIC | Age: 73
End: 2018-07-03

## 2018-07-03 ENCOUNTER — OFFICE VISIT (OUTPATIENT)
Dept: FAMILY MEDICINE CLINIC | Age: 73
End: 2018-07-03

## 2018-07-03 VITALS
BODY MASS INDEX: 23.38 KG/M2 | HEART RATE: 86 BPM | DIASTOLIC BLOOD PRESSURE: 70 MMHG | SYSTOLIC BLOOD PRESSURE: 110 MMHG | TEMPERATURE: 98.6 F | OXYGEN SATURATION: 99 % | WEIGHT: 136.2 LBS

## 2018-07-03 DIAGNOSIS — Z79.4 CONTROLLED TYPE 2 DIABETES MELLITUS WITH MICROALBUMINURIA, WITH LONG-TERM CURRENT USE OF INSULIN (HCC): ICD-10-CM

## 2018-07-03 DIAGNOSIS — E78.2 MIXED HYPERLIPIDEMIA: ICD-10-CM

## 2018-07-03 DIAGNOSIS — K11.21 ACUTE SIALOADENITIS: ICD-10-CM

## 2018-07-03 DIAGNOSIS — I10 ESSENTIAL HYPERTENSION: ICD-10-CM

## 2018-07-03 DIAGNOSIS — S16.1XXA STRAIN OF NECK MUSCLE, INITIAL ENCOUNTER: Primary | ICD-10-CM

## 2018-07-03 DIAGNOSIS — R80.9 CONTROLLED TYPE 2 DIABETES MELLITUS WITH MICROALBUMINURIA, WITH LONG-TERM CURRENT USE OF INSULIN (HCC): ICD-10-CM

## 2018-07-03 DIAGNOSIS — E11.29 CONTROLLED TYPE 2 DIABETES MELLITUS WITH MICROALBUMINURIA, WITH LONG-TERM CURRENT USE OF INSULIN (HCC): ICD-10-CM

## 2018-07-03 PROCEDURE — 3017F COLORECTAL CA SCREEN DOC REV: CPT | Performed by: FAMILY MEDICINE

## 2018-07-03 PROCEDURE — 1036F TOBACCO NON-USER: CPT | Performed by: FAMILY MEDICINE

## 2018-07-03 PROCEDURE — G8427 DOCREV CUR MEDS BY ELIG CLIN: HCPCS | Performed by: FAMILY MEDICINE

## 2018-07-03 PROCEDURE — G8400 PT W/DXA NO RESULTS DOC: HCPCS | Performed by: FAMILY MEDICINE

## 2018-07-03 PROCEDURE — 1090F PRES/ABSN URINE INCON ASSESS: CPT | Performed by: FAMILY MEDICINE

## 2018-07-03 PROCEDURE — 4040F PNEUMOC VAC/ADMIN/RCVD: CPT | Performed by: FAMILY MEDICINE

## 2018-07-03 PROCEDURE — 3045F PR MOST RECENT HEMOGLOBIN A1C LEVEL 7.0-9.0%: CPT | Performed by: FAMILY MEDICINE

## 2018-07-03 PROCEDURE — 1123F ACP DISCUSS/DSCN MKR DOCD: CPT | Performed by: FAMILY MEDICINE

## 2018-07-03 PROCEDURE — 99214 OFFICE O/P EST MOD 30 MIN: CPT | Performed by: FAMILY MEDICINE

## 2018-07-03 PROCEDURE — G8420 CALC BMI NORM PARAMETERS: HCPCS | Performed by: FAMILY MEDICINE

## 2018-07-03 PROCEDURE — 2022F DILAT RTA XM EVC RTNOPTHY: CPT | Performed by: FAMILY MEDICINE

## 2018-07-03 RX ORDER — AMOXICILLIN AND CLAVULANATE POTASSIUM 875; 125 MG/1; MG/1
1 TABLET, FILM COATED ORAL 2 TIMES DAILY
Qty: 14 TABLET | Refills: 0 | Status: SHIPPED | OUTPATIENT
Start: 2018-07-03 | End: 2018-07-10

## 2018-07-04 ASSESSMENT — ENCOUNTER SYMPTOMS
GASTROINTESTINAL NEGATIVE: 1
EYES NEGATIVE: 1
RESPIRATORY NEGATIVE: 1

## 2018-07-04 NOTE — PROGRESS NOTES
7/4/18    [facility] Clause  1945      Chief Complaint   Patient presents with    Facial Swelling     Neck Pain: Gissel complains of neck pain. Event that precipitate these symptoms: none known. Onset of symptoms a few days ago, gradually improving since that time. Current symptoms are pain in right neck (aching in character; 3/10 in severity). Patient denies . Patient has had no prior neck problems. Previous treatments include: none. Hypertension: Patient here for follow-up of elevated blood pressure. she is exercising and is adherent to low salt diet. Blood pressure is well controlled at home. Cardiac symptoms none. Patient denies chest pain, dyspnea and fatigue. Cardiovascular risk factors: dyslipidemia and hypertension. Use of agents associated with hypertension: none. History of target organ damage: none. Dyslipidemia: Patient presents for evaluation of lipids. Compliance with treatment thus far has been excellent. A repeat fasting lipid profile was done. The patient does not use medications that may worsen dyslipidemias (corticosteroids, progestins, anabolic steroids, diuretics, beta-blockers, amiodarone, cyclosporine, olanzapine). The patient exercises intermittently. The patient is not known to have coexisting coronary artery disease. Diabetes Mellitus Type II, Follow-up: Patient here for follow-up of Type 2 diabetes mellitus. Current symptoms/problems include hyperglycemia and have been stable. Symptoms have been present for several years. Known diabetic complications: nephropathy  Cardiovascular risk factors: dyslipidemia and hypertension  Current diabetic medications include none.      Eye exam current (within one year): no  Weight trend: stable  Prior visit with dietician: yes -   Current diet: in general, a \"healthy\" diet    Current exercise: aerobics    Current monitoring regimen: home blood tests - daily  Home blood sugar records: trend: stable  Any episodes of Family History   Problem Relation Age of Onset    High Blood Pressure Mother     High Cholesterol Mother     Depression Mother     High Blood Pressure Brother     High Cholesterol Brother     Diabetes Maternal Grandmother      Social History     Social History    Marital status:      Spouse name: N/A    Number of children: N/A    Years of education: N/A     Occupational History    Not on file. Social History Main Topics    Smoking status: Former Smoker     Packs/day: 0.25     Years: 51.00     Types: Cigarettes     Quit date: 3/9/2016    Smokeless tobacco: Never Used      Comment: intermittent smoking over last 46 yrs. Jg Polanco has a cigarette now. Quit routine smoking 1996    Alcohol use 0.0 oz/week      Comment: Occas. x 2-3/ month    Drug use: No    Sexual activity: Not on file     Other Topics Concern    Not on file     Social History Narrative    No narrative on file         Any recent diagnostic tests, hospital reports, office notes or consultation letters were reviewed prior to and during the visit. Review of Systems   Constitutional: Negative. HENT: Negative. Eyes: Negative. Respiratory: Negative. Cardiovascular: Negative. Gastrointestinal: Negative. Genitourinary: Negative. Musculoskeletal: Negative. Psychiatric/Behavioral: Negative. Physical Exam   Constitutional: She appears well-developed and well-nourished. No distress. HENT:   Head: Normocephalic and atraumatic. Right Ear: Hearing, tympanic membrane, external ear and ear canal normal.   Left Ear: Hearing, tympanic membrane, external ear and ear canal normal.   Nose: Nose normal. No mucosal edema or rhinorrhea. Mouth/Throat: Uvula is midline, oropharynx is clear and moist and mucous membranes are normal.   Eyes: Conjunctivae and EOM are normal. Pupils are equal, round, and reactive to light. Right eye exhibits no discharge. Left eye exhibits no discharge. No scleral icterus. Neck: Trachea normal and normal range of motion. Neck supple. No JVD present. No tracheal deviation present. No thyromegaly present. Cardiovascular: Normal rate, regular rhythm, normal heart sounds and intact distal pulses. Exam reveals no gallop and no friction rub. No murmur heard. Pulses:       Dorsalis pedis pulses are 2+ on the right side, and 2+ on the left side. Posterior tibial pulses are 2+ on the right side, and 2+ on the left side. Pulmonary/Chest: Effort normal and breath sounds normal. No stridor. No respiratory distress. She has no decreased breath sounds. She has no wheezes. She has no rhonchi. She has no rales. She exhibits no tenderness. Abdominal: Soft. Bowel sounds are normal. She exhibits no distension and no mass. There is no hepatosplenomegaly. There is no tenderness. There is no rebound and no guarding. No hernia. Lymphadenopathy:     She has no cervical adenopathy. Skin: She is not diaphoretic. Psychiatric: She has a normal mood and affect. Her behavior is normal. Judgment and thought content normal.          Diagnosis Orders   1. Strain of neck muscle, initial encounter  The condition is deteriorating, will change treatment, investigate cause and make further recommendations when data back. Need to do ROM exercises     2. Acute sialoadenitis  The condition is deteriorating, will change treatment, investigate cause and make further recommendations when data back. Will treat  amoxicillin-clavulanate (AUGMENTIN) 875-125 MG per tablet   3. Essential hypertension  Condition stable continue the medications, treatments, will check labs as appropriate       The patient received counseling on the following healthy behaviors: nutrition, exercise, medication adherence and decrease in alcohol consumption    Patient given educational materials on Nutrition, Exercise and Hypertension as appropriate.     I have instructed the patient to complete a self tracking handout on

## 2018-07-18 DIAGNOSIS — E78.2 MIXED HYPERLIPIDEMIA: ICD-10-CM

## 2018-07-18 DIAGNOSIS — R80.9 CONTROLLED TYPE 2 DIABETES MELLITUS WITH MICROALBUMINURIA, WITH LONG-TERM CURRENT USE OF INSULIN (HCC): ICD-10-CM

## 2018-07-18 DIAGNOSIS — I10 ESSENTIAL HYPERTENSION: ICD-10-CM

## 2018-07-18 DIAGNOSIS — E11.29 CONTROLLED TYPE 2 DIABETES MELLITUS WITH MICROALBUMINURIA, WITH LONG-TERM CURRENT USE OF INSULIN (HCC): ICD-10-CM

## 2018-07-18 DIAGNOSIS — Z79.4 CONTROLLED TYPE 2 DIABETES MELLITUS WITH MICROALBUMINURIA, WITH LONG-TERM CURRENT USE OF INSULIN (HCC): ICD-10-CM

## 2018-07-18 LAB
A/G RATIO: 1.8 (ref 1.1–2.2)
ALBUMIN SERPL-MCNC: 4.1 G/DL (ref 3.4–5)
ALP BLD-CCNC: 53 U/L (ref 40–129)
ALT SERPL-CCNC: 16 U/L (ref 10–40)
ANION GAP SERPL CALCULATED.3IONS-SCNC: 12 MMOL/L (ref 3–16)
AST SERPL-CCNC: 21 U/L (ref 15–37)
BILIRUB SERPL-MCNC: <0.2 MG/DL (ref 0–1)
BUN BLDV-MCNC: 21 MG/DL (ref 7–20)
CALCIUM SERPL-MCNC: 9.9 MG/DL (ref 8.3–10.6)
CHLORIDE BLD-SCNC: 103 MMOL/L (ref 99–110)
CHOLESTEROL, TOTAL: 141 MG/DL (ref 0–199)
CO2: 25 MMOL/L (ref 21–32)
CREAT SERPL-MCNC: 1 MG/DL (ref 0.6–1.2)
GFR AFRICAN AMERICAN: >60
GFR NON-AFRICAN AMERICAN: 55
GLOBULIN: 2.3 G/DL
GLUCOSE BLD-MCNC: 157 MG/DL (ref 70–99)
HDLC SERPL-MCNC: 45 MG/DL (ref 40–60)
LDL CHOLESTEROL CALCULATED: 60 MG/DL
POTASSIUM SERPL-SCNC: 4.3 MMOL/L (ref 3.5–5.1)
SODIUM BLD-SCNC: 140 MMOL/L (ref 136–145)
TOTAL PROTEIN: 6.4 G/DL (ref 6.4–8.2)
TRIGL SERPL-MCNC: 181 MG/DL (ref 0–150)
VLDLC SERPL CALC-MCNC: 36 MG/DL

## 2018-08-03 RX ORDER — PEN NEEDLE, DIABETIC 31 GX3/16"
NEEDLE, DISPOSABLE MISCELLANEOUS
Qty: 30 EACH | Refills: 3 | Status: SHIPPED | OUTPATIENT
Start: 2018-08-03 | End: 2018-08-06 | Stop reason: SDUPTHER

## 2018-08-06 ENCOUNTER — OFFICE VISIT (OUTPATIENT)
Dept: DERMATOLOGY | Age: 73
End: 2018-08-06

## 2018-08-06 DIAGNOSIS — L64.9 ANDROGENETIC ALOPECIA: Primary | ICD-10-CM

## 2018-08-06 DIAGNOSIS — L65.0 TELOGEN EFFLUVIUM: ICD-10-CM

## 2018-08-06 DIAGNOSIS — Z87.891 FORMER SMOKER: ICD-10-CM

## 2018-08-06 PROCEDURE — 99202 OFFICE O/P NEW SF 15 MIN: CPT | Performed by: DERMATOLOGY

## 2018-08-06 PROCEDURE — 1090F PRES/ABSN URINE INCON ASSESS: CPT | Performed by: DERMATOLOGY

## 2018-08-06 PROCEDURE — 4040F PNEUMOC VAC/ADMIN/RCVD: CPT | Performed by: DERMATOLOGY

## 2018-08-06 PROCEDURE — 1123F ACP DISCUSS/DSCN MKR DOCD: CPT | Performed by: DERMATOLOGY

## 2018-08-06 PROCEDURE — 1036F TOBACCO NON-USER: CPT | Performed by: DERMATOLOGY

## 2018-08-06 PROCEDURE — G8420 CALC BMI NORM PARAMETERS: HCPCS | Performed by: DERMATOLOGY

## 2018-08-06 PROCEDURE — G8427 DOCREV CUR MEDS BY ELIG CLIN: HCPCS | Performed by: DERMATOLOGY

## 2018-08-06 PROCEDURE — G8400 PT W/DXA NO RESULTS DOC: HCPCS | Performed by: DERMATOLOGY

## 2018-08-06 PROCEDURE — 1101F PT FALLS ASSESS-DOCD LE1/YR: CPT | Performed by: DERMATOLOGY

## 2018-08-06 PROCEDURE — 3017F COLORECTAL CA SCREEN DOC REV: CPT | Performed by: DERMATOLOGY

## 2018-08-06 NOTE — PATIENT INSTRUCTIONS
Try using Over The Counter Rogaine 5% foam -Generic is ok and you can use either women's or men's as they are the same formulation. Apply nightly and you can wash out in the AM if you don't like the feel of it, but you don't have to. You may notice more hair falling out initially but that is normal, it may take at least 4-6 months before you notice any regrowth.

## 2018-08-06 NOTE — TELEPHONE ENCOUNTER
Pt is requesting a 3mth supply for meds she isn't scheduled for a np appt until Oct. And will not have meds to last until then.  Please Advise

## 2018-08-06 NOTE — PROGRESS NOTES
Problem Relation Age of Onset    High Blood Pressure Mother     High Cholesterol Mother     Depression Mother     High Blood Pressure Brother     High Cholesterol Brother     Diabetes Maternal Grandmother      Past Medical History:   Diagnosis Date    Anesthesia     hx of reaction to medications can have \"opposite\" effect with sedatives and causes patient to be jittery, agitated. Patient has concern what will be given.      Arthritis     Diabetic eye exam ([facility] [address] 08.24.15    Dr Gabrielle Ortiz Diabetic eye exam ([facility] [address] 01/11/2017    Hyperlipidemia     Hypertension     Pneumonia 1996    Type II or unspecified type diabetes mellitus without mention of complication, not stated as uncontrolled      Past Surgical History:   Procedure Laterality Date    CARPAL TUNNEL RELEASE      bilateral    COLONOSCOPY  5/2013    HYSTERECTOMY      partial    MASTECTOMY, PARTIAL Left 07/18/2013    LEFT BREAST NEEDLE LOCALIZED PARTIAL MASTECTOMY EXCISIONAL    OTHER SURGICAL HISTORY      medications for sedation can make her jittery    TUBAL LIGATION         Allergies   Allergen Reactions    Benadryl [Diphenhydramine]     Erythromycin Nausea And Vomiting     Outpatient Prescriptions Marked as Taking for the 8/6/18 encounter (Office Visit) with Nusrat Luna, DO   Medication Sig Dispense Refill    BIOTIN PO Take by mouth      SURE COMFORT PEN NEEDLES 31G X 5 MM MISC USE WITH LANTUS SOLOSTAR PENS 30 each 3    metFORMIN (GLUCOPHAGE-XR) 500 MG extended release tablet TAKE ONE TABLET BY MOUTH TWICE DAILY (TAKE WITH FOOD) 60 tablet 0    JARDIANCE 25 MG tablet TAKE ONE TABLET BY MOUTH DAILY 30 tablet 0    fenofibrate 160 MG tablet TAKE ONE TABLET DAILY 30 tablet 0    VICTOZA 18 MG/3ML SOPN SC injection INJECT SUBCUTANEOUSLY 1.8 MG DAILY 9 mL 0    insulin glargine (LANTUS SOLOSTAR) 100 UNIT/ML injection pen 32 units daily 45 mL 1    Blood Glucose Monitoring Suppl MARIA ELENA Use to check sugar daily MAY SUBSTITUTE 1 Device 3

## 2018-08-09 NOTE — TELEPHONE ENCOUNTER
Can you please clarify which medications need refilled and their dosages please. Will send supply to hold her over until she can see Dr. Rudy Anderson.  Thanks

## 2018-10-08 ENCOUNTER — TELEPHONE (OUTPATIENT)
Dept: ENDOCRINOLOGY | Age: 73
End: 2018-10-08

## 2018-10-12 ENCOUNTER — OFFICE VISIT (OUTPATIENT)
Dept: FAMILY MEDICINE CLINIC | Age: 73
End: 2018-10-12
Payer: MEDICARE

## 2018-10-12 VITALS
HEIGHT: 64 IN | HEART RATE: 75 BPM | TEMPERATURE: 96.3 F | DIASTOLIC BLOOD PRESSURE: 60 MMHG | SYSTOLIC BLOOD PRESSURE: 102 MMHG | BODY MASS INDEX: 23.7 KG/M2 | WEIGHT: 138.8 LBS | OXYGEN SATURATION: 94 %

## 2018-10-12 DIAGNOSIS — E11.9 INSULIN DEPENDENT TYPE 2 DIABETES MELLITUS (HCC): Primary | ICD-10-CM

## 2018-10-12 DIAGNOSIS — Z79.4 INSULIN DEPENDENT TYPE 2 DIABETES MELLITUS (HCC): Primary | ICD-10-CM

## 2018-10-12 PROCEDURE — 2022F DILAT RTA XM EVC RTNOPTHY: CPT | Performed by: FAMILY MEDICINE

## 2018-10-12 PROCEDURE — 1123F ACP DISCUSS/DSCN MKR DOCD: CPT | Performed by: FAMILY MEDICINE

## 2018-10-12 PROCEDURE — 4040F PNEUMOC VAC/ADMIN/RCVD: CPT | Performed by: FAMILY MEDICINE

## 2018-10-12 PROCEDURE — 3017F COLORECTAL CA SCREEN DOC REV: CPT | Performed by: FAMILY MEDICINE

## 2018-10-12 PROCEDURE — 99213 OFFICE O/P EST LOW 20 MIN: CPT | Performed by: FAMILY MEDICINE

## 2018-10-12 PROCEDURE — 1036F TOBACCO NON-USER: CPT | Performed by: FAMILY MEDICINE

## 2018-10-12 PROCEDURE — G8400 PT W/DXA NO RESULTS DOC: HCPCS | Performed by: FAMILY MEDICINE

## 2018-10-12 PROCEDURE — 1090F PRES/ABSN URINE INCON ASSESS: CPT | Performed by: FAMILY MEDICINE

## 2018-10-12 PROCEDURE — 3045F PR MOST RECENT HEMOGLOBIN A1C LEVEL 7.0-9.0%: CPT | Performed by: FAMILY MEDICINE

## 2018-10-12 PROCEDURE — G8427 DOCREV CUR MEDS BY ELIG CLIN: HCPCS | Performed by: FAMILY MEDICINE

## 2018-10-12 PROCEDURE — G8420 CALC BMI NORM PARAMETERS: HCPCS | Performed by: FAMILY MEDICINE

## 2018-10-12 PROCEDURE — 1101F PT FALLS ASSESS-DOCD LE1/YR: CPT | Performed by: FAMILY MEDICINE

## 2018-10-12 PROCEDURE — G8482 FLU IMMUNIZE ORDER/ADMIN: HCPCS | Performed by: FAMILY MEDICINE

## 2018-10-12 NOTE — PROGRESS NOTES
 Years of education: N/A     Occupational History    Not on file. Social History Main Topics    Smoking status: Former Smoker     Packs/day: 0.25     Years: 51.00     Types: Cigarettes     Quit date: 3/9/2016    Smokeless tobacco: Never Used      Comment: intermittent smoking over last 46 yrs. Claudia Dent has a cigarette now. Quit routine smoking 1996    Alcohol use 0.0 oz/week      Comment: Occas. x 2-3/ month    Drug use: No    Sexual activity: Not on file     Other Topics Concern    Not on file     Social History Narrative    No narrative on file       O: /60   Pulse 75   Temp 96.3 °F (35.7 °C) (Oral)   Ht 5' 4\" (1.626 m)   Wt 138 lb 12.8 oz (63 kg)   LMP  (LMP Unknown)   SpO2 94%   BMI 23.82 kg/m²   Physical Exam  GEN: No acute distress, cooperative, well nourished, alert. HEENT: PEERLA, EOMI , normocephalic/atraumatic, nares and oropharynx clear. Mucus membranes normal, Tympanic membranes clear bilaterally. Neck: soft, supple, no thyromegaly, mass, no Lymphadenopathy  CV: Regular rate and rhythm, no murmur, rubs, gallops. No edema. Resp: Clear to auscultation bilaterally good air entry bilaterally  No crackles, wheeze. Breathing comfortably. Psych: normal affect. Neuro: AOx3      ASSESSMENT   Diagnosis Orders   1. Insulin dependent type 2 diabetes mellitus (HCC)  Lipid Panel    HEMOGLOBIN A1C    RENAL FUNCTION PANEL    empagliflozin (JARDIANCE) 25 MG tablet     The current medical regimen is effective;  continue present plan and medications. Keep f/u plans with [facility] endocrinology. Renew Jardiance. PLAN          See rest of plan under patient instructions. Return in about 6 months (around 4/12/2019). Patient Instructions   1) Keep up partnership with Dr. Aj Jackson every other year. 2) Keep appt with Dr. Clau Sorto with dermatology. 3) Get your labwork done. 4) Follow up in about 6 months. 5) Mychart communication is an option.   6) Keep up your partnership with you

## 2018-11-09 DIAGNOSIS — E78.2 MIXED HYPERLIPIDEMIA: ICD-10-CM

## 2018-11-09 RX ORDER — FENOFIBRATE 160 MG/1
TABLET ORAL
Qty: 30 TABLET | Refills: 0 | Status: SHIPPED | OUTPATIENT
Start: 2018-11-09 | End: 2019-01-21 | Stop reason: SDUPTHER

## 2018-11-09 RX ORDER — METFORMIN HYDROCHLORIDE 500 MG/1
TABLET, EXTENDED RELEASE ORAL
Qty: 60 TABLET | Refills: 0 | Status: SHIPPED | OUTPATIENT
Start: 2018-11-09 | End: 2019-01-21 | Stop reason: SDUPTHER

## 2018-11-09 RX ORDER — LIRAGLUTIDE 6 MG/ML
INJECTION SUBCUTANEOUS
Qty: 9 ML | Refills: 3 | Status: SHIPPED | OUTPATIENT
Start: 2018-11-09 | End: 2018-12-02 | Stop reason: SDUPTHER

## 2018-11-09 RX ORDER — LIRAGLUTIDE 6 MG/ML
INJECTION SUBCUTANEOUS
Qty: 3 ML | Refills: 2 | Status: SHIPPED | OUTPATIENT
Start: 2018-11-09 | End: 2018-11-09 | Stop reason: SDUPTHER

## 2018-11-09 RX ORDER — LIRAGLUTIDE 6 MG/ML
INJECTION SUBCUTANEOUS
Qty: 9 ML | Refills: 0 | OUTPATIENT
Start: 2018-11-09

## 2018-12-03 RX ORDER — LIRAGLUTIDE 6 MG/ML
INJECTION SUBCUTANEOUS
Qty: 9 ML | Refills: 2 | Status: SHIPPED | OUTPATIENT
Start: 2018-12-03 | End: 2019-03-12 | Stop reason: SDUPTHER

## 2018-12-07 ENCOUNTER — OFFICE VISIT (OUTPATIENT)
Dept: FAMILY MEDICINE CLINIC | Age: 73
End: 2018-12-07
Payer: MEDICARE

## 2018-12-07 VITALS
RESPIRATION RATE: 14 BRPM | SYSTOLIC BLOOD PRESSURE: 106 MMHG | DIASTOLIC BLOOD PRESSURE: 65 MMHG | OXYGEN SATURATION: 97 % | HEART RATE: 82 BPM | WEIGHT: 139 LBS | BODY MASS INDEX: 23.86 KG/M2

## 2018-12-07 DIAGNOSIS — R79.89 CREATININE ELEVATION: ICD-10-CM

## 2018-12-07 DIAGNOSIS — E11.9 TYPE 2 DIABETES MELLITUS WITH HEMOGLOBIN A1C GOAL OF 7.0%-8.0% (HCC): Primary | ICD-10-CM

## 2018-12-07 PROCEDURE — G8400 PT W/DXA NO RESULTS DOC: HCPCS | Performed by: FAMILY MEDICINE

## 2018-12-07 PROCEDURE — 99213 OFFICE O/P EST LOW 20 MIN: CPT | Performed by: FAMILY MEDICINE

## 2018-12-07 PROCEDURE — 4040F PNEUMOC VAC/ADMIN/RCVD: CPT | Performed by: FAMILY MEDICINE

## 2018-12-07 PROCEDURE — 1036F TOBACCO NON-USER: CPT | Performed by: FAMILY MEDICINE

## 2018-12-07 PROCEDURE — 1101F PT FALLS ASSESS-DOCD LE1/YR: CPT | Performed by: FAMILY MEDICINE

## 2018-12-07 PROCEDURE — G8427 DOCREV CUR MEDS BY ELIG CLIN: HCPCS | Performed by: FAMILY MEDICINE

## 2018-12-07 PROCEDURE — 1090F PRES/ABSN URINE INCON ASSESS: CPT | Performed by: FAMILY MEDICINE

## 2018-12-07 PROCEDURE — 3017F COLORECTAL CA SCREEN DOC REV: CPT | Performed by: FAMILY MEDICINE

## 2018-12-07 PROCEDURE — G8420 CALC BMI NORM PARAMETERS: HCPCS | Performed by: FAMILY MEDICINE

## 2018-12-07 PROCEDURE — 3045F PR MOST RECENT HEMOGLOBIN A1C LEVEL 7.0-9.0%: CPT | Performed by: FAMILY MEDICINE

## 2018-12-07 PROCEDURE — G8482 FLU IMMUNIZE ORDER/ADMIN: HCPCS | Performed by: FAMILY MEDICINE

## 2018-12-07 PROCEDURE — 2022F DILAT RTA XM EVC RTNOPTHY: CPT | Performed by: FAMILY MEDICINE

## 2018-12-07 PROCEDURE — 1123F ACP DISCUSS/DSCN MKR DOCD: CPT | Performed by: FAMILY MEDICINE

## 2018-12-07 NOTE — PROGRESS NOTES
generated using dragon dictation software. Although every effort was made to ensure the accuracy of this automated transcription,some errors in transcription may have occurred.

## 2018-12-11 ENCOUNTER — OFFICE VISIT (OUTPATIENT)
Dept: DERMATOLOGY | Age: 73
End: 2018-12-11
Payer: MEDICARE

## 2018-12-11 DIAGNOSIS — L64.9 ANDROGENETIC ALOPECIA: ICD-10-CM

## 2018-12-11 DIAGNOSIS — D48.9 NEOPLASM OF UNCERTAIN BEHAVIOR: Primary | ICD-10-CM

## 2018-12-11 DIAGNOSIS — L57.0 ACTINIC KERATOSES: ICD-10-CM

## 2018-12-11 DIAGNOSIS — L82.1 SEBORRHEIC KERATOSES: ICD-10-CM

## 2018-12-11 DIAGNOSIS — L81.4 LENTIGINES: ICD-10-CM

## 2018-12-11 DIAGNOSIS — D22.9 MULTIPLE BENIGN MELANOCYTIC NEVI: ICD-10-CM

## 2018-12-11 PROCEDURE — G8482 FLU IMMUNIZE ORDER/ADMIN: HCPCS | Performed by: DERMATOLOGY

## 2018-12-11 PROCEDURE — 99213 OFFICE O/P EST LOW 20 MIN: CPT | Performed by: DERMATOLOGY

## 2018-12-11 PROCEDURE — 1036F TOBACCO NON-USER: CPT | Performed by: DERMATOLOGY

## 2018-12-11 PROCEDURE — 4040F PNEUMOC VAC/ADMIN/RCVD: CPT | Performed by: DERMATOLOGY

## 2018-12-11 PROCEDURE — 1101F PT FALLS ASSESS-DOCD LE1/YR: CPT | Performed by: DERMATOLOGY

## 2018-12-11 PROCEDURE — 3017F COLORECTAL CA SCREEN DOC REV: CPT | Performed by: DERMATOLOGY

## 2018-12-11 PROCEDURE — G8400 PT W/DXA NO RESULTS DOC: HCPCS | Performed by: DERMATOLOGY

## 2018-12-11 PROCEDURE — G8427 DOCREV CUR MEDS BY ELIG CLIN: HCPCS | Performed by: DERMATOLOGY

## 2018-12-11 PROCEDURE — G8420 CALC BMI NORM PARAMETERS: HCPCS | Performed by: DERMATOLOGY

## 2018-12-11 PROCEDURE — 1090F PRES/ABSN URINE INCON ASSESS: CPT | Performed by: DERMATOLOGY

## 2018-12-11 PROCEDURE — 1123F ACP DISCUSS/DSCN MKR DOCD: CPT | Performed by: DERMATOLOGY

## 2018-12-11 PROCEDURE — 17000 DESTRUCT PREMALG LESION: CPT | Performed by: DERMATOLOGY

## 2018-12-11 PROCEDURE — 17003 DESTRUCT PREMALG LES 2-14: CPT | Performed by: DERMATOLOGY

## 2018-12-11 PROCEDURE — 11100 PR BIOPSY OF SKIN LESION: CPT | Performed by: DERMATOLOGY

## 2018-12-11 NOTE — PATIENT INSTRUCTIONS
Sun Protection Tips    · Apply broad spectrum water resistant sunscreen with an SPF of at least 30 to exposed areas of the skin. Dont forget the ears and lips! Remember to reapply sunscreen about every 2 hours and after swimming or sweating. · Wear sun protective clothing. Swim shirts (aka. rash guards) are a great idea and negates the need to reapply sunscreen in those areas. · Seek the shade whenever possible especially between the hours of 10am and 4pm when the suns rays are the strongest.     · Avoid tanning beds    · Wear a wide brim hat while in the sun    Seborrheic keratosis    Educational Overview:  Seborrheic keratosis (seb-o-REE-ik care-uh-TOE-sis) is a common benign, or harmless, skin growth that affect people over the age of 27. They are not cancer and do not increase the risk of developing skin cancer. The exact cause is unknown but the tendency to develop SKs seems to be inherited. Almost all adults develop one or more seborrheic keratoses (SKs) and some people may develop many. Some growths may have a warty surface while others look like dabs of warm, brown candle wax on the skin. Seborrheic keratoses range in color from white to black; however, most are tan or brown. You can find these harmless growths anywhere on the skin, except the palms and soles. Most often, youll see them on the chest, back, head, or neck. The condition is more likely with advancing age, and the number of growths often increases over the years. Seborrheic keratoses are not contagious. Because of the benign nature of seborrheic keratoses, they can be left untreated if they are non-problematic  In cases where SKs are consistently irritated with shaving, itch or bleed excessively, enlarge, become irritated by clothing or other sources of contact, or are cosmetically undesirable, please contact your Dermatologist for evaluation and removal recommendations.      Ref: American Academy of Dermatology       Biopsy Wound Care Instructions   Cleanse the wound with mild soapy water daily.  Gently dry the area.  Apply Vaseline or petroleum jelly to the wound using a cotton tipped applicator or Qtip.  Cover with a clean bandage.  Repeat this process until the biopsy site is healed.  If you had stitches placed, continue treating the site until the stitches are removed. Remember to make an appointment to return to have your stitches removed by our staff.  You may shower and bathe as usual.   ** Biopsy results generally take around 7 business days to come back. If you have not heard from us by then, please call the office at [phone] between 8AM and 4PM Monday through Friday. Cryosurgery (Freezing) Wound Care Instructions    AFTER THE PROCEDURE:    You will notice swelling and redness around the site. This is normal.    You may experience a sharp or sore feeling for the next several days. For this discomfort, you may take acetaminophen (Tylenol©).  A blister may develop at the treated area, sometimes as soon as by the end of the day. After several days, the blister will subside and a scab will form.  If the area is bumped or traumatized during the first few days following freezing, you may develop bleeding into the blister, forming a blood blister. This is nothing to be alarmed about.  If the blister is tense, uncomfortable, or much larger than the site that was frozen, you may pop the blister along its edge with a sterile needle (boiled, heated under a flame, or cleaned with alcohol) to allow the fluid to drain out. If the blister does not bother you, no treatment is needed.  Do NOT peel off the top of the blister roof. It will act as a dressing on top of your wound. WOUND CARE:    You may shower or bathe as usual, but avoid scrubbing the areas that have been frozen.  Cleanse the site twice a day with mild soapy water, and then apply a thin film of white petrolatum (Vaseline©).     You

## 2018-12-14 LAB — DERMATOLOGY PATHOLOGY REPORT: ABNORMAL

## 2018-12-17 ENCOUNTER — TELEPHONE (OUTPATIENT)
Dept: DERMATOLOGY | Age: 73
End: 2018-12-17

## 2018-12-18 DIAGNOSIS — C44.91 BASAL CELL CARCINOMA (BCC), UNSPECIFIED SITE: Primary | ICD-10-CM

## 2019-01-07 ENCOUNTER — PROCEDURE VISIT (OUTPATIENT)
Dept: SURGERY | Age: 74
End: 2019-01-07
Payer: MEDICARE

## 2019-01-07 VITALS
DIASTOLIC BLOOD PRESSURE: 62 MMHG | SYSTOLIC BLOOD PRESSURE: 99 MMHG | OXYGEN SATURATION: 97 % | WEIGHT: 134 LBS | TEMPERATURE: 98 F | BODY MASS INDEX: 23 KG/M2 | HEART RATE: 55 BPM

## 2019-01-07 DIAGNOSIS — C44.311 BASAL CELL CARCINOMA OF NOSE: Primary | ICD-10-CM

## 2019-01-07 DIAGNOSIS — E11.9 TYPE 2 DIABETES MELLITUS WITH HEMOGLOBIN A1C GOAL OF 7.0%-8.0% (HCC): ICD-10-CM

## 2019-01-07 LAB
ALBUMIN SERPL-MCNC: 4.2 G/DL (ref 3.4–5)
ANION GAP SERPL CALCULATED.3IONS-SCNC: 14 MMOL/L (ref 3–16)
BUN BLDV-MCNC: 18 MG/DL (ref 7–20)
CALCIUM SERPL-MCNC: 10 MG/DL (ref 8.3–10.6)
CHLORIDE BLD-SCNC: 101 MMOL/L (ref 99–110)
CO2: 25 MMOL/L (ref 21–32)
CREAT SERPL-MCNC: 1 MG/DL (ref 0.6–1.2)
GFR AFRICAN AMERICAN: >60
GFR NON-AFRICAN AMERICAN: 55
GLUCOSE BLD-MCNC: 213 MG/DL (ref 70–99)
PHOSPHORUS: 4 MG/DL (ref 2.5–4.9)
POTASSIUM SERPL-SCNC: 4.8 MMOL/L (ref 3.5–5.1)
SODIUM BLD-SCNC: 140 MMOL/L (ref 136–145)

## 2019-01-07 PROCEDURE — 17312 MOHS ADDL STAGE: CPT | Performed by: DERMATOLOGY

## 2019-01-07 PROCEDURE — 17311 MOHS 1 STAGE H/N/HF/G: CPT | Performed by: DERMATOLOGY

## 2019-01-08 ENCOUNTER — OFFICE VISIT (OUTPATIENT)
Dept: ENDOCRINOLOGY | Age: 74
End: 2019-01-08
Payer: MEDICARE

## 2019-01-08 ENCOUNTER — TELEPHONE (OUTPATIENT)
Dept: SURGERY | Age: 74
End: 2019-01-08

## 2019-01-08 VITALS
WEIGHT: 139.6 LBS | SYSTOLIC BLOOD PRESSURE: 95 MMHG | HEART RATE: 75 BPM | HEIGHT: 64 IN | OXYGEN SATURATION: 98 % | RESPIRATION RATE: 16 BRPM | DIASTOLIC BLOOD PRESSURE: 55 MMHG | BODY MASS INDEX: 23.83 KG/M2

## 2019-01-08 DIAGNOSIS — I10 ESSENTIAL HYPERTENSION: ICD-10-CM

## 2019-01-08 LAB
ESTIMATED AVERAGE GLUCOSE: 197.3 MG/DL
HBA1C MFR BLD: 8.5 %

## 2019-01-08 PROCEDURE — 4040F PNEUMOC VAC/ADMIN/RCVD: CPT | Performed by: INTERNAL MEDICINE

## 2019-01-08 PROCEDURE — 1036F TOBACCO NON-USER: CPT | Performed by: INTERNAL MEDICINE

## 2019-01-08 PROCEDURE — G8400 PT W/DXA NO RESULTS DOC: HCPCS | Performed by: INTERNAL MEDICINE

## 2019-01-08 PROCEDURE — 1101F PT FALLS ASSESS-DOCD LE1/YR: CPT | Performed by: INTERNAL MEDICINE

## 2019-01-08 PROCEDURE — G8427 DOCREV CUR MEDS BY ELIG CLIN: HCPCS | Performed by: INTERNAL MEDICINE

## 2019-01-08 PROCEDURE — G8482 FLU IMMUNIZE ORDER/ADMIN: HCPCS | Performed by: INTERNAL MEDICINE

## 2019-01-08 PROCEDURE — 2022F DILAT RTA XM EVC RTNOPTHY: CPT | Performed by: INTERNAL MEDICINE

## 2019-01-08 PROCEDURE — 3045F PR MOST RECENT HEMOGLOBIN A1C LEVEL 7.0-9.0%: CPT | Performed by: INTERNAL MEDICINE

## 2019-01-08 PROCEDURE — 1090F PRES/ABSN URINE INCON ASSESS: CPT | Performed by: INTERNAL MEDICINE

## 2019-01-08 PROCEDURE — G8420 CALC BMI NORM PARAMETERS: HCPCS | Performed by: INTERNAL MEDICINE

## 2019-01-08 PROCEDURE — 99214 OFFICE O/P EST MOD 30 MIN: CPT | Performed by: INTERNAL MEDICINE

## 2019-01-08 PROCEDURE — 1123F ACP DISCUSS/DSCN MKR DOCD: CPT | Performed by: INTERNAL MEDICINE

## 2019-01-08 PROCEDURE — 3017F COLORECTAL CA SCREEN DOC REV: CPT | Performed by: INTERNAL MEDICINE

## 2019-01-09 LAB — DIABETIC RETINOPATHY: NEGATIVE

## 2019-01-21 ENCOUNTER — OFFICE VISIT (OUTPATIENT)
Dept: SURGERY | Age: 74
End: 2019-01-21
Payer: MEDICARE

## 2019-01-21 ENCOUNTER — TELEPHONE (OUTPATIENT)
Dept: ENDOCRINOLOGY | Age: 74
End: 2019-01-21

## 2019-01-21 DIAGNOSIS — E78.2 MIXED HYPERLIPIDEMIA: ICD-10-CM

## 2019-01-21 DIAGNOSIS — Z51.89 VISIT FOR WOUND CHECK: Primary | ICD-10-CM

## 2019-01-21 DIAGNOSIS — I10 ESSENTIAL HYPERTENSION: ICD-10-CM

## 2019-01-21 PROCEDURE — 3017F COLORECTAL CA SCREEN DOC REV: CPT | Performed by: DERMATOLOGY

## 2019-01-21 PROCEDURE — 1036F TOBACCO NON-USER: CPT | Performed by: DERMATOLOGY

## 2019-01-21 PROCEDURE — G8427 DOCREV CUR MEDS BY ELIG CLIN: HCPCS | Performed by: DERMATOLOGY

## 2019-01-21 PROCEDURE — 1090F PRES/ABSN URINE INCON ASSESS: CPT | Performed by: DERMATOLOGY

## 2019-01-21 PROCEDURE — G8400 PT W/DXA NO RESULTS DOC: HCPCS | Performed by: DERMATOLOGY

## 2019-01-21 PROCEDURE — G8482 FLU IMMUNIZE ORDER/ADMIN: HCPCS | Performed by: DERMATOLOGY

## 2019-01-21 PROCEDURE — 1123F ACP DISCUSS/DSCN MKR DOCD: CPT | Performed by: DERMATOLOGY

## 2019-01-21 PROCEDURE — G8420 CALC BMI NORM PARAMETERS: HCPCS | Performed by: DERMATOLOGY

## 2019-01-21 PROCEDURE — 99212 OFFICE O/P EST SF 10 MIN: CPT | Performed by: DERMATOLOGY

## 2019-01-21 PROCEDURE — 4040F PNEUMOC VAC/ADMIN/RCVD: CPT | Performed by: DERMATOLOGY

## 2019-01-21 PROCEDURE — 1101F PT FALLS ASSESS-DOCD LE1/YR: CPT | Performed by: DERMATOLOGY

## 2019-01-21 RX ORDER — FENOFIBRATE 160 MG/1
TABLET ORAL
Qty: 90 TABLET | Refills: 1 | Status: SHIPPED | OUTPATIENT
Start: 2019-01-21 | End: 2019-07-30 | Stop reason: SDUPTHER

## 2019-01-21 RX ORDER — LISINOPRIL 30 MG/1
TABLET ORAL
Qty: 90 TABLET | Refills: 1 | Status: SHIPPED | OUTPATIENT
Start: 2019-01-21 | End: 2019-04-16

## 2019-01-21 RX ORDER — METFORMIN HYDROCHLORIDE 500 MG/1
TABLET, EXTENDED RELEASE ORAL
Qty: 180 TABLET | Refills: 1 | Status: SHIPPED | OUTPATIENT
Start: 2019-01-21 | End: 2019-07-30 | Stop reason: SDUPTHER

## 2019-01-21 RX ORDER — ROSUVASTATIN CALCIUM 40 MG/1
TABLET, COATED ORAL
Qty: 90 TABLET | Refills: 1 | Status: SHIPPED | OUTPATIENT
Start: 2019-01-21 | End: 2019-07-30 | Stop reason: SDUPTHER

## 2019-01-22 RX ORDER — PEN NEEDLE, DIABETIC 31 GX3/16"
NEEDLE, DISPOSABLE MISCELLANEOUS
Qty: 30 EACH | Refills: 5 | Status: SHIPPED | OUTPATIENT
Start: 2019-01-22 | End: 2019-05-13 | Stop reason: SDUPTHER

## 2019-03-12 DIAGNOSIS — Z79.4 INSULIN DEPENDENT TYPE 2 DIABETES MELLITUS (HCC): ICD-10-CM

## 2019-03-12 DIAGNOSIS — E11.9 INSULIN DEPENDENT TYPE 2 DIABETES MELLITUS (HCC): ICD-10-CM

## 2019-03-12 RX ORDER — LIRAGLUTIDE 6 MG/ML
INJECTION SUBCUTANEOUS
Qty: 9 ML | Refills: 2 | Status: SHIPPED | OUTPATIENT
Start: 2019-03-12 | End: 2019-07-06 | Stop reason: SDUPTHER

## 2019-03-26 ENCOUNTER — TELEPHONE (OUTPATIENT)
Dept: FAMILY MEDICINE CLINIC | Age: 74
End: 2019-03-26

## 2019-03-26 DIAGNOSIS — E11.9 INSULIN DEPENDENT TYPE 2 DIABETES MELLITUS (HCC): Primary | ICD-10-CM

## 2019-03-26 DIAGNOSIS — Z79.4 INSULIN DEPENDENT TYPE 2 DIABETES MELLITUS (HCC): Primary | ICD-10-CM

## 2019-04-05 DIAGNOSIS — E11.9 INSULIN DEPENDENT TYPE 2 DIABETES MELLITUS (HCC): ICD-10-CM

## 2019-04-05 DIAGNOSIS — Z79.4 INSULIN DEPENDENT TYPE 2 DIABETES MELLITUS (HCC): ICD-10-CM

## 2019-04-05 LAB
A/G RATIO: 1.9 (ref 1.1–2.2)
ALBUMIN SERPL-MCNC: 4.2 G/DL (ref 3.4–5)
ALP BLD-CCNC: 51 U/L (ref 40–129)
ALT SERPL-CCNC: 14 U/L (ref 10–40)
ANION GAP SERPL CALCULATED.3IONS-SCNC: 10 MMOL/L (ref 3–16)
AST SERPL-CCNC: 21 U/L (ref 15–37)
BILIRUB SERPL-MCNC: <0.2 MG/DL (ref 0–1)
BUN BLDV-MCNC: 25 MG/DL (ref 7–20)
CALCIUM SERPL-MCNC: 9.4 MG/DL (ref 8.3–10.6)
CHLORIDE BLD-SCNC: 108 MMOL/L (ref 99–110)
CHOLESTEROL, TOTAL: 101 MG/DL (ref 0–199)
CO2: 25 MMOL/L (ref 21–32)
CREAT SERPL-MCNC: 0.9 MG/DL (ref 0.6–1.2)
GFR AFRICAN AMERICAN: >60
GFR NON-AFRICAN AMERICAN: >60
GLOBULIN: 2.2 G/DL
GLUCOSE BLD-MCNC: 94 MG/DL (ref 70–99)
HCT VFR BLD CALC: 39.4 % (ref 36–48)
HDLC SERPL-MCNC: 37 MG/DL (ref 40–60)
HEMOGLOBIN: 12.6 G/DL (ref 12–16)
LDL CHOLESTEROL CALCULATED: 42 MG/DL
MCH RBC QN AUTO: 27.2 PG (ref 26–34)
MCHC RBC AUTO-ENTMCNC: 32.1 G/DL (ref 31–36)
MCV RBC AUTO: 84.7 FL (ref 80–100)
PDW BLD-RTO: 14.8 % (ref 12.4–15.4)
PLATELET # BLD: 198 K/UL (ref 135–450)
PMV BLD AUTO: 10.3 FL (ref 5–10.5)
POTASSIUM SERPL-SCNC: 4.3 MMOL/L (ref 3.5–5.1)
RBC # BLD: 4.66 M/UL (ref 4–5.2)
SODIUM BLD-SCNC: 143 MMOL/L (ref 136–145)
TOTAL PROTEIN: 6.4 G/DL (ref 6.4–8.2)
TRIGL SERPL-MCNC: 112 MG/DL (ref 0–150)
VITAMIN B-12: 476 PG/ML (ref 211–911)
VLDLC SERPL CALC-MCNC: 22 MG/DL
WBC # BLD: 5.9 K/UL (ref 4–11)

## 2019-04-06 LAB
ESTIMATED AVERAGE GLUCOSE: 168.6 MG/DL
HBA1C MFR BLD: 7.5 %

## 2019-04-08 ENCOUNTER — OFFICE VISIT (OUTPATIENT)
Dept: ENDOCRINOLOGY | Age: 74
End: 2019-04-08
Payer: MEDICARE

## 2019-04-08 VITALS
RESPIRATION RATE: 16 BRPM | BODY MASS INDEX: 23.29 KG/M2 | HEIGHT: 64 IN | DIASTOLIC BLOOD PRESSURE: 54 MMHG | WEIGHT: 136.4 LBS | HEART RATE: 72 BPM | OXYGEN SATURATION: 99 % | SYSTOLIC BLOOD PRESSURE: 91 MMHG

## 2019-04-08 DIAGNOSIS — Z79.4 CONTROLLED TYPE 2 DIABETES MELLITUS WITHOUT COMPLICATION, WITH LONG-TERM CURRENT USE OF INSULIN (HCC): Primary | ICD-10-CM

## 2019-04-08 DIAGNOSIS — E78.49 OTHER HYPERLIPIDEMIA: ICD-10-CM

## 2019-04-08 DIAGNOSIS — I10 ESSENTIAL HYPERTENSION: ICD-10-CM

## 2019-04-08 DIAGNOSIS — E11.9 CONTROLLED TYPE 2 DIABETES MELLITUS WITHOUT COMPLICATION, WITH LONG-TERM CURRENT USE OF INSULIN (HCC): Primary | ICD-10-CM

## 2019-04-08 PROCEDURE — 1036F TOBACCO NON-USER: CPT | Performed by: INTERNAL MEDICINE

## 2019-04-08 PROCEDURE — 99214 OFFICE O/P EST MOD 30 MIN: CPT | Performed by: INTERNAL MEDICINE

## 2019-04-08 PROCEDURE — G8427 DOCREV CUR MEDS BY ELIG CLIN: HCPCS | Performed by: INTERNAL MEDICINE

## 2019-04-08 PROCEDURE — 3045F PR MOST RECENT HEMOGLOBIN A1C LEVEL 7.0-9.0%: CPT | Performed by: INTERNAL MEDICINE

## 2019-04-08 PROCEDURE — 2022F DILAT RTA XM EVC RTNOPTHY: CPT | Performed by: INTERNAL MEDICINE

## 2019-04-08 PROCEDURE — G8400 PT W/DXA NO RESULTS DOC: HCPCS | Performed by: INTERNAL MEDICINE

## 2019-04-08 PROCEDURE — G8420 CALC BMI NORM PARAMETERS: HCPCS | Performed by: INTERNAL MEDICINE

## 2019-04-08 PROCEDURE — 1090F PRES/ABSN URINE INCON ASSESS: CPT | Performed by: INTERNAL MEDICINE

## 2019-04-08 PROCEDURE — 1123F ACP DISCUSS/DSCN MKR DOCD: CPT | Performed by: INTERNAL MEDICINE

## 2019-04-08 PROCEDURE — 4040F PNEUMOC VAC/ADMIN/RCVD: CPT | Performed by: INTERNAL MEDICINE

## 2019-04-08 PROCEDURE — 3017F COLORECTAL CA SCREEN DOC REV: CPT | Performed by: INTERNAL MEDICINE

## 2019-04-08 NOTE — PROGRESS NOTES
Seen as f/u    Interim:  Glucose improved  Still has bowel issues with metformin    Diagnosed with Type 2 diabetes mellitus in 2006  Known diabetic complications: Neuropathy    Current diabetic medications   Metformin 500mg BID can not tolerate higher dose  Jardiance  25mg  Victoza  1.8  Lantus 42 units    h/o use of sulfonylurea/ TZD      Last A1c 7.5%<------ 8.5%<------7.6%<------- 9%<------9.8 on 1/18 <--- 10.2 <--- 9.1    Prior visit with dietician: Yes   Current diet: on average, 3 meals per day   Current exercise: walking   Current monitoring regimen: home blood tests -1  times daily     Has brought blood glucose log/meter: yes  Home blood sugar records:   Any episodes of hypoglycemia? -    No Hx of CAD , PVD, CVA    Hyperlipidemia: controlled, taking statin  LDL 36 on 1/18   crestor 40mg fenofibrate 160mg  LDL 42 on 4/19  Last eye exam: 1/18  Last foot exam: 4.19  Last microalbumin to creatinine ratio: 4/18    HTN: Controlled, on  lisinopril 15mg    States hair loss for last 2 years    1/18 Testosterone < 3  DHEA-S 20  TSH 1.04    Calcium high in the past but normal since 2011 1/18 Ca 9.8  7/11  Ca 11.0  6/09 Ca 10.9    Past Medical History:   Diagnosis Date    Anesthesia     hx of reaction to medications can have \"opposite\" effect with sedatives and causes patient to be jittery, agitated. Patient has concern what will be given.      Arthritis     [facility] exam ([facility] 08.24.15    Dr Jeison Romero Diabetic eye exam ([facility] 01/11/2017    Hyperlipidemia     Hypertension     Pneumonia 1996    Type II or unspecified type diabetes mellitus without mention of complication, not stated as uncontrolled      Past Surgical History:   Procedure Laterality Date    CARPAL TUNNEL RELEASE      bilateral    COLONOSCOPY  5/2013    HYSTERECTOMY      partial    MASTECTOMY, PARTIAL Left 07/18/2013    LEFT BREAST NEEDLE LOCALIZED PARTIAL MASTECTOMY EXCISIONAL    OTHER SURGICAL HISTORY      medications for sedation can make her jittery    TUBAL LIGATION       Current Outpatient Medications   Medication Sig Dispense Refill    empagliflozin (JARDIANCE) 25 MG tablet TAKE ONE TABLET BY MOUTH DAILY 90 tablet 1    VICTOZA 18 MG/3ML SOPN SC injection INJECT (1.8MG) SUBCUTANEOUSLY DAILY 9 mL 2    SURE COMFORT PEN NEEDLES 31G X 5 MM MISC USE WITH LANTUS SOLOSTAR PENS 30 each 5    metFORMIN (GLUCOPHAGE-XR) 500 MG extended release tablet TAKE ONE TABLET BY MOUTH TWICE DAILY (TAKE WITH FOOD) 180 tablet 1    fenofibrate 160 MG tablet TAKE ONE TABLET BY MOUTH DAILY 90 tablet 1    lisinopril (PRINIVIL;ZESTRIL) 30 MG tablet TAKE ONE TABLET DAILY 90 tablet 1    rosuvastatin (CRESTOR) 40 MG tablet TAKE ONE TABLET BY MOUTH EACH EVENING 90 tablet 1    Lactobacillus (BIOTINEX PO) Take by mouth      Blood Glucose Monitoring Suppl MARIA ELENA Use to check sugar daily MAY SUBSTITUTE 1 Device 3    Glucose Blood (BLOOD GLUCOSE TEST STRIPS) STRP Use to check sugar  Once daily MAY SUBSTITUTE PER INSURANCE 100 strip 3    Lancets MISC Use to check sugar once daily MAY SUBSTITUTE PER INSURANCE 100 each 3    Insulin Pen Needle 32G X 4 MM MISC 1 each by Does not apply route daily 100 each 3    melatonin (RA MELATONIN) 3 MG TABS tablet Take 3 mg by mouth daily   3    Cholecalciferol (VITAMIN D3) 400 UNIT/ML LIQD Take 5,000 Int'l Units by mouth daily.  insulin glargine (BASAGLAR KWIKPEN) 100 UNIT/ML injection pen 42 units daily 5 pen 3     No current facility-administered medications for this visit. Review of Systems  Please see scanned document dated and signed      Objective:      BP (!) 91/54 (Site: Left Upper Arm, Position: Sitting, Cuff Size: Medium Adult)   Pulse 72   Resp 16   Ht 5' 4\" (1.626 m)   Wt 136 lb 6.4 oz (61.9 kg)   LMP  (LMP Unknown)   SpO2 99%   Breastfeeding?  No   BMI 23.41 kg/m²   Wt Readings from Last 3 Encounters:   04/08/19 136 lb 6.4 oz (61.9 kg)   01/08/19 139 lb 9.6 oz (63.3 kg)   01/07/19 134 lb DHEA-S, TSH nl. Testosterone low, discussed it is unlikely cause of her hair loss. Advised to see dermatology to rule out any other causes. Plan:       basaglar due to insurance 42 units daily, advised self titration    continue victoza   Continue metformin   May need prandial insulin, she would like to avoid   Advise to check blood sugar 1 -2 times a day   Patient to send blood sugar log for titration. Advise to exercise regularly. Advise to low simple carbohydrate and protein with each  meal diet. Diabetes Care: recommend yearly eye exam, foot exam and urine microalbumin to   creatinine ratio. Patient is up-to-date.        3 months and then F/u in 6 months as will see PCP

## 2019-04-09 ENCOUNTER — OFFICE VISIT (OUTPATIENT)
Dept: ENDOCRINOLOGY | Age: 74
End: 2019-04-09
Payer: MEDICARE

## 2019-04-09 DIAGNOSIS — R80.9 CONTROLLED TYPE 2 DIABETES MELLITUS WITH MICROALBUMINURIA, WITH LONG-TERM CURRENT USE OF INSULIN (HCC): ICD-10-CM

## 2019-04-09 DIAGNOSIS — Z79.4 CONTROLLED TYPE 2 DIABETES MELLITUS WITH MICROALBUMINURIA, WITH LONG-TERM CURRENT USE OF INSULIN (HCC): ICD-10-CM

## 2019-04-09 DIAGNOSIS — E11.29 CONTROLLED TYPE 2 DIABETES MELLITUS WITH MICROALBUMINURIA, WITH LONG-TERM CURRENT USE OF INSULIN (HCC): ICD-10-CM

## 2019-04-09 PROCEDURE — 97802 MEDICAL NUTRITION INDIV IN: CPT | Performed by: DIETITIAN, REGISTERED

## 2019-04-09 NOTE — PROGRESS NOTES
Medical Nutrition Therapy for Diabetes    Ankit Rangel  April 9, 2019      Patient Care Team:  Husam Lisa DO as PCP - General (Family Medicine)  Husam Lisa DO as PCP - [facility] Attributed Provider  Zaida Wood (Ophthalmology)  Iza Louie MD as Physician (Obstetrics & Gynecology)    Reason for visit: Type 2 diabetes        Patient Active Problem List   Diagnosis    Rotator cuff tear    Telangiectasia    Plantar fasciitis    Cervicalgia    Keratosis    Degenerative joint disease    Disc disorder of cervical region    Carpal tunnel syndrome    Peripheral neuropathy    Vitamin D deficiency    Pyriformis syndrome    Essential hypertension    Uncontrolled type 2 diabetes mellitus without complication, without long-term current use of insulin ([address]    Primary insomnia    Mixed hyperlipidemia    Hypotestosteronemia    Alopecia    Controlled type 2 diabetes mellitus with microalbuminuria, with long-term current use of insulin ([facility])    Basal cell carcinoma of nose       Current Outpatient Medications   Medication Sig Dispense Refill    empagliflozin (JARDIANCE) 25 MG tablet TAKE ONE TABLET BY MOUTH DAILY 90 tablet 1    VICTOZA 18 MG/3ML SOPN SC injection INJECT (1.8MG) SUBCUTANEOUSLY DAILY 9 mL 2    SURE COMFORT PEN NEEDLES 31G X 5 MM MISC USE WITH LANTUS SOLOSTAR PENS 30 each 5    metFORMIN (GLUCOPHAGE-XR) 500 MG extended release tablet TAKE ONE TABLET BY MOUTH TWICE DAILY (TAKE WITH FOOD) 180 tablet 1    fenofibrate 160 MG tablet TAKE ONE TABLET BY MOUTH DAILY 90 tablet 1    lisinopril (PRINIVIL;ZESTRIL) 30 MG tablet TAKE ONE TABLET DAILY 90 tablet 1    rosuvastatin (CRESTOR) 40 MG tablet TAKE ONE TABLET BY MOUTH EACH EVENING 90 tablet 1    insulin glargine (BASAGLAR KWIKPEN) 100 UNIT/ML injection pen 42 units daily 5 pen 3    Lactobacillus (BIOTINEX PO) Take by mouth      Blood Glucose Monitoring Suppl MARIA ELENA Use to check sugar daily MAY SUBSTITUTE 1 Device 3    Glucose Blood (BLOOD GLUCOSE TEST STRIPS) STRP Use to check sugar  Once daily MAY SUBSTITUTE PER INSURANCE 100 strip 3    Lancets MISC Use to check sugar once daily MAY SUBSTITUTE PER INSURANCE 100 each 3    Insulin Pen Needle 32G X 4 MM MISC 1 each by Does not apply route daily 100 each 3    melatonin (RA MELATONIN) 3 MG TABS tablet Take 3 mg by mouth daily   3    Cholecalciferol (VITAMIN D3) 400 UNIT/ML LIQD Take 5,000 Int'l Units by mouth daily. No current facility-administered medications for this visit. NUTRITION ASSESSMENT    Biochemical Data:    Lab Results   Component Value Date    LABA1C 7.5 04/05/2019     Lab Results   Component Value Date    .6 04/05/2019       Lab Results   Component Value Date    CHOL 101 04/05/2019    CHOL 127 10/24/2018    CHOL 141 07/18/2018     Lab Results   Component Value Date    TRIG 112 04/05/2019    TRIG 134 10/24/2018    TRIG 181 (H) 07/18/2018     Lab Results   Component Value Date    HDL 37 (L) 04/05/2019    HDL 41 10/24/2018    HDL 45 07/18/2018     Lab Results   Component Value Date    LDLCALC 42 04/05/2019    LDLCALC 59 10/24/2018    LDLCALC 60 07/18/2018     Lab Results   Component Value Date    LABVLDL 22 04/05/2019    LABVLDL 27 10/24/2018    LABVLDL 36 07/18/2018     No results found for: CHOLHDLRATIO    Lab Results   Component Value Date    WBC 5.9 04/05/2019    HGB 12.6 04/05/2019    HCT 39.4 04/05/2019    MCV 84.7 04/05/2019     04/05/2019       Lab Results   Component Value Date    CREATININE 0.9 04/05/2019    BUN 25 (H) 04/05/2019     04/05/2019    K 4.3 04/05/2019     04/05/2019    CO2 25 04/05/2019         Anthropometric Measurements: Wt: 136 lbs.   Wt Readings from Last 3 Encounters:   04/08/19 136 lb 6.4 oz (61.9 kg)   01/08/19 139 lb 9.6 oz (63.3 kg)   01/07/19 134 lb (60.8 kg)      BMI: 23.41 kg/m2  BMI Readings from Last 3 Encounters:   04/08/19 23.41 kg/m²   01/08/19 23.96 kg/m²   01/07/19 23.00 kg/m² Patient's stated goal weight:   7% Weight loss goal weight:     Nutrition-Focused Physical Findings:    General Appearance: well kept   Affect: alert, asking questions and very interested in conversation    Food and Nutrition History:   Usual Food consumption: 2-3 meals per day, 20-25 grams carbohydrate per meal (carb count includes protein foods), non-starch vegetable intake vegetable is low. Beverage consumption: water and non-caffeine unsweet beverages  Nutrition Awareness/Previous DSMES: 2 years ago  Food Availability: secure      Physical Activity History:   Physical activity: balance, stretch exercises  Frequency of activity: 3 times per week  Duration of activity: 30 minutes  Obstacles to activity: none      Diabetes Medications:     Knows name and dose of prescribed medications [id number]  Knows prescribed schedule for [id number]  Recent change in medication type/dosage: [id number]  Stores  medications [id number]  Comments: patient seems to has a good handle on DM meds        Monitoring:   Has BG meter: [id number]  Testing frequency:   Recent results: same range    Barriers:   -none          NUTRITION DIAGNOSIS    #1 Problem: Altered Nutrition-Related Laboratory Values (NC-2.2)  Related to: Endocrine/Diabetes   As Evidenced by: Elevated Plasma glucose and/or HgbA1c levels         #2 Problem: Altered Nutrition (NI-5.8.4)                   Inconsistent carbohydrate    #3 Problem: Altered Nutrition (NI-5.8. 1)                   Inadequate carbohydrate           NUTRITION INTERVENTION  Nutrition Prescription:  3 meals with 20-25 grams carbohydrate include protein and non-starch vegetables, have 10-15 grams carbohydrate per snack      Diabetes Education/Counseling included:  Carbohydrate Control, Label-reading, Hypoyglycemia prevention/treatment and Insulin management    Interventions:  Control Carbohydrate Intake using Carb Counting and Increase intake of vegetables, avoid counting protein foods such as Cottage cheese in carb allowance.       NUTRITION MONITORING AND EVALUATION  Indicator/Goal Criteria   #1  A1C reduced  #1 .5-1% reduction   #2  20-25 grams carbohydrate per meal #2 review food records   #3  Adequate carbohydrate per meal #3 review food records       Follow Up Plan: return in 1 week    Referring Provider: India Higgins MD  Time spent with patient: 60 minutes

## 2019-04-12 NOTE — PROGRESS NOTES
3    Glucose Blood (BLOOD GLUCOSE TEST STRIPS) STRP Use to check sugar  Once daily MAY SUBSTITUTE PER INSURANCE 100 strip 3    Lancets MISC Use to check sugar once daily MAY SUBSTITUTE PER INSURANCE 100 each 3    Insulin Pen Needle 32G X 4 MM MISC 1 each by Does not apply route daily 100 each 3    melatonin (RA MELATONIN) 3 MG TABS tablet Take 3 mg by mouth daily   3    Cholecalciferol (VITAMIN D3) 400 UNIT/ML LIQD Take 5,000 Int'l Units by mouth daily. No current facility-administered medications for this visit. NUTRITION ASSESSMENT    Biochemical Data:    Lab Results   Component Value Date    LABA1C 7.5 04/05/2019     Lab Results   Component Value Date    .6 04/05/2019       Lab Results   Component Value Date    CHOL 101 04/05/2019    CHOL 127 10/24/2018    CHOL 141 07/18/2018     Lab Results   Component Value Date    TRIG 112 04/05/2019    TRIG 134 10/24/2018    TRIG 181 (H) 07/18/2018     Lab Results   Component Value Date    HDL 37 (L) 04/05/2019    HDL 41 10/24/2018    HDL 45 07/18/2018     Lab Results   Component Value Date    LDLCALC 42 04/05/2019    LDLCALC 59 10/24/2018    LDLCALC 60 07/18/2018     Lab Results   Component Value Date    LABVLDL 22 04/05/2019    LABVLDL 27 10/24/2018    LABVLDL 36 07/18/2018     No results found for: CHOLHDLRATIO    Lab Results   Component Value Date    WBC 5.9 04/05/2019    HGB 12.6 04/05/2019    HCT 39.4 04/05/2019    MCV 84.7 04/05/2019     04/05/2019       Lab Results   Component Value Date    CREATININE 0.9 04/05/2019    BUN 25 (H) 04/05/2019     04/05/2019    K 4.3 04/05/2019     04/05/2019    CO2 25 04/05/2019         Anthropometric Measurements: Wt: 136lbs.   Wt Readings from Last 3 Encounters:   04/08/19 136 lb 6.4 oz (61.9 kg)   01/08/19 139 lb 9.6 oz (63.3 kg)   01/07/19 134 lb (60.8 kg)      BMI: 23.41kg/m2  BMI Readings from Last 3 Encounters:   04/08/19 23.41 kg/m²   01/08/19 23.96 kg/m²   01/07/19 23.00 kg/m² Patient's stated goal weight:   7% Weight loss goal weight:     Nutrition-Focused Physical Findings:    General Appearance: well kept   Affect: alert, interested in information expressed, many questions    Food and Nutrition History:   Usual Food consumption: 3 meals with 3 snacks, 15-20 grams carbohydrate per meal and 15 grams per snacks, selects a variety of food choices  Beverage consumption: water and coffee  Nutrition Awareness/Previous DSMES:  1 week ago  Food Availability: Secure      Physical Activity History:   Physical activity: stretches and balance exercises  Frequency of activity: 3 times per week  Duration of activity: 30 minutes  Obstacles to activity: none      Diabetes Medications:     Knows name and dose of prescribed medications   Knows prescribed schedule for medications  Recent change in medication type/dosage:   Stores  medications properly  Comments:         Monitoring:   Has BG meter:   Testing frequency:   Recent results:     Barriers:   -none          NUTRITION DIAGNOSIS    #1 Problem: Altered Nutrition-Related Laboratory Values (NC-2.2)  Related to: Endocrine/Diabetes   As Evidenced by: Elevated Plasma glucose and/or HgbA1c levels         #2 Problem: Inconsistent carbohydrate NI-5.8.4                   #3 Problem: Adequate carbohydrate NI-5.8.1           NUTRITION INTERVENTION  Nutrition Prescription: 3 meals with 20-25 grams carbohydrate include protein and non-starch vegetable, have 10-15 grams crab per snack      Diabetes Education/Counseling included:  Reviewed label reading, fiber and recommended grams per day, calculation for carbohydrate adjustment for 5 or more grams per serving when/if mealtime insulin is prescribed  Recommended blood sugar levels 2 hours after meals    Interventions:  Same as above for label use and fiber influence of total carbohydrate    Target 2 hour post prandial: less than 180 or MD recommendation      NUTRITION MONITORING AND EVALUATION  Indicator/Goal Criteria   #1  A1C   #1 target 7% or less   #2 20-25 grams carbohydrate  #2 review food recall   #3  Maintain Adequate carb #3 review food recall        Follow Up Plan: 1 month    Referring Provider: Miguel Angel Jackson MD  Time spent with patient: 45 minutes

## 2019-04-16 ENCOUNTER — OFFICE VISIT (OUTPATIENT)
Dept: FAMILY MEDICINE CLINIC | Age: 74
End: 2019-04-16
Payer: MEDICARE

## 2019-04-16 ENCOUNTER — OFFICE VISIT (OUTPATIENT)
Dept: ENDOCRINOLOGY | Age: 74
End: 2019-04-16
Payer: MEDICARE

## 2019-04-16 VITALS
DIASTOLIC BLOOD PRESSURE: 54 MMHG | OXYGEN SATURATION: 95 % | HEART RATE: 71 BPM | RESPIRATION RATE: 12 BRPM | SYSTOLIC BLOOD PRESSURE: 92 MMHG | TEMPERATURE: 96 F | WEIGHT: 138.4 LBS | BODY MASS INDEX: 23.76 KG/M2

## 2019-04-16 DIAGNOSIS — Z79.4 CONTROLLED TYPE 2 DIABETES MELLITUS WITHOUT COMPLICATION, WITH LONG-TERM CURRENT USE OF INSULIN (HCC): Primary | ICD-10-CM

## 2019-04-16 DIAGNOSIS — E11.9 TYPE 2 DIABETES MELLITUS WITH HEMOGLOBIN A1C GOAL OF 7.0%-8.0% (HCC): ICD-10-CM

## 2019-04-16 DIAGNOSIS — E11.9 CONTROLLED TYPE 2 DIABETES MELLITUS WITHOUT COMPLICATION, WITH LONG-TERM CURRENT USE OF INSULIN (HCC): Primary | ICD-10-CM

## 2019-04-16 DIAGNOSIS — M70.61 TROCHANTERIC BURSITIS OF RIGHT HIP: Primary | ICD-10-CM

## 2019-04-16 PROCEDURE — 3017F COLORECTAL CA SCREEN DOC REV: CPT | Performed by: FAMILY MEDICINE

## 2019-04-16 PROCEDURE — 1123F ACP DISCUSS/DSCN MKR DOCD: CPT | Performed by: FAMILY MEDICINE

## 2019-04-16 PROCEDURE — 82044 UR ALBUMIN SEMIQUANTITATIVE: CPT | Performed by: FAMILY MEDICINE

## 2019-04-16 PROCEDURE — G8427 DOCREV CUR MEDS BY ELIG CLIN: HCPCS | Performed by: FAMILY MEDICINE

## 2019-04-16 PROCEDURE — G8400 PT W/DXA NO RESULTS DOC: HCPCS | Performed by: FAMILY MEDICINE

## 2019-04-16 PROCEDURE — 1036F TOBACCO NON-USER: CPT | Performed by: FAMILY MEDICINE

## 2019-04-16 PROCEDURE — G8420 CALC BMI NORM PARAMETERS: HCPCS | Performed by: FAMILY MEDICINE

## 2019-04-16 PROCEDURE — 3045F PR MOST RECENT HEMOGLOBIN A1C LEVEL 7.0-9.0%: CPT | Performed by: FAMILY MEDICINE

## 2019-04-16 PROCEDURE — 2022F DILAT RTA XM EVC RTNOPTHY: CPT | Performed by: FAMILY MEDICINE

## 2019-04-16 PROCEDURE — 99214 OFFICE O/P EST MOD 30 MIN: CPT | Performed by: FAMILY MEDICINE

## 2019-04-16 PROCEDURE — 1090F PRES/ABSN URINE INCON ASSESS: CPT | Performed by: FAMILY MEDICINE

## 2019-04-16 PROCEDURE — 97803 MED NUTRITION INDIV SUBSEQ: CPT | Performed by: DIETITIAN, REGISTERED

## 2019-04-16 PROCEDURE — 4040F PNEUMOC VAC/ADMIN/RCVD: CPT | Performed by: FAMILY MEDICINE

## 2019-04-16 RX ORDER — LISINOPRIL 10 MG/1
10 TABLET ORAL DAILY
Qty: 90 TABLET | Refills: 1 | Status: SHIPPED | OUTPATIENT
Start: 2019-04-16 | End: 2019-11-02 | Stop reason: SDUPTHER

## 2019-04-16 RX ORDER — GLUCOSAMINE/D3/BOSWELLIA SERRA 1500MG-400
TABLET ORAL
COMMUNITY

## 2019-04-16 RX ORDER — GLIMEPIRIDE 2 MG/1
1 TABLET ORAL 2 TIMES DAILY
COMMUNITY

## 2019-04-16 ASSESSMENT — PATIENT HEALTH QUESTIONNAIRE - PHQ9
SUM OF ALL RESPONSES TO PHQ QUESTIONS 1-9: 0
2. FEELING DOWN, DEPRESSED OR HOPELESS: 0
1. LITTLE INTEREST OR PLEASURE IN DOING THINGS: 0
SUM OF ALL RESPONSES TO PHQ9 QUESTIONS 1 & 2: 0
SUM OF ALL RESPONSES TO PHQ QUESTIONS 1-9: 0

## 2019-04-17 DIAGNOSIS — M70.61 TROCHANTERIC BURSITIS OF RIGHT HIP: ICD-10-CM

## 2019-04-17 NOTE — TELEPHONE ENCOUNTER
Refill is being requested for:  --diclofenac sodium 1 % GEL     (Rationale: Pharmacy states that they never received Diclofenac Gel script from 4/16/19, as it is listed as \"Class: Print\" in Epic. Patient states she never received a paper script. OK to e-scribe to pharmacy below?)    Last OV with PCP on:  --4/16/19    Last Labs on:  --4/5/19    Requested Pharmacy:  --[facility] [address] [facility], [place] [phone]    Does PCP feel refill is appropriate? Thank you!

## 2019-04-18 NOTE — TELEPHONE ENCOUNTER
Elizabeth the EMR problem. Could office staff check with Francois Casarez to confirm e-script was received and that Francois Casarez can let Ramin Mendoza know that rx is available?

## 2019-05-07 ENCOUNTER — OFFICE VISIT (OUTPATIENT)
Dept: ENDOCRINOLOGY | Age: 74
End: 2019-05-07
Payer: MEDICARE

## 2019-05-07 PROCEDURE — 97802 MEDICAL NUTRITION INDIV IN: CPT | Performed by: DIETITIAN, REGISTERED

## 2019-05-07 NOTE — PROGRESS NOTES
long-term current use of insulin (HCC)    Basal cell carcinoma of nose       Current Outpatient Medications   Medication Sig Dispense Refill    diclofenac sodium 1 % GEL Apply 4 g topically 4 times daily 1 Tube 1    Biotin 18053 MCG TABS Take by mouth      timolol (TIMOPTIC) 0.25 % ophthalmic solution 1 drop 2 times daily      bimatoprost (LUMIGAN) 0.01 % SOLN ophthalmic drops Place 1 drop into both eyes nightly      lisinopril (PRINIVIL;ZESTRIL) 10 MG tablet Take 1 tablet by mouth daily 90 tablet 1    empagliflozin (JARDIANCE) 25 MG tablet TAKE ONE TABLET BY MOUTH DAILY 90 tablet 1    VICTOZA 18 MG/3ML SOPN SC injection INJECT (1.8MG) SUBCUTANEOUSLY DAILY 9 mL 2    SURE COMFORT PEN NEEDLES 31G X 5 MM MISC USE WITH LANTUS SOLOSTAR PENS 30 each 5    metFORMIN (GLUCOPHAGE-XR) 500 MG extended release tablet TAKE ONE TABLET BY MOUTH TWICE DAILY (TAKE WITH FOOD) 180 tablet 1    fenofibrate 160 MG tablet TAKE ONE TABLET BY MOUTH DAILY 90 tablet 1    rosuvastatin (CRESTOR) 40 MG tablet TAKE ONE TABLET BY MOUTH EACH EVENING 90 tablet 1    insulin glargine (BASAGLAR KWIKPEN) 100 UNIT/ML injection pen 42 units daily 5 pen 3    Blood Glucose Monitoring Suppl MARIA ELENA Use to check sugar daily MAY SUBSTITUTE 1 Device 3    Glucose Blood (BLOOD GLUCOSE TEST STRIPS) STRP Use to check sugar  Once daily MAY SUBSTITUTE PER INSURANCE 100 strip 3    Lancets MISC Use to check sugar once daily MAY SUBSTITUTE PER INSURANCE 100 each 3    Insulin Pen Needle 32G X 4 MM MISC 1 each by Does not apply route daily 100 each 3    melatonin (RA MELATONIN) 3 MG TABS tablet Take 3 mg by mouth daily   3    Cholecalciferol (VITAMIN D3) 400 UNIT/ML LIQD Take 5,000 Int'l Units by mouth daily. No current facility-administered medications for this visit.           NUTRITION ASSESSMENT    Biochemical Data:    Lab Results   Component Value Date    LABA1C 7.5 04/05/2019     Lab Results   Component Value Date    .6 04/05/2019 Lab Results   Component Value Date    CHOL 101 04/05/2019    CHOL 127 10/24/2018    CHOL 141 07/18/2018     Lab Results   Component Value Date    TRIG 112 04/05/2019    TRIG 134 10/24/2018    TRIG 181 (H) 07/18/2018     Lab Results   Component Value Date    HDL 37 (L) 04/05/2019    HDL 41 10/24/2018    HDL 45 07/18/2018     Lab Results   Component Value Date    LDLCALC 42 04/05/2019    LDLCALC 59 10/24/2018    LDLCALC 60 07/18/2018     Lab Results   Component Value Date    LABVLDL 22 04/05/2019    LABVLDL 27 10/24/2018    LABVLDL 36 07/18/2018     No results found for: CHOLHDLRATIO    Lab Results   Component Value Date    WBC 5.9 04/05/2019    HGB 12.6 04/05/2019    HCT 39.4 04/05/2019    MCV 84.7 04/05/2019     04/05/2019       Lab Results   Component Value Date    CREATININE 0.9 04/05/2019    BUN 25 (H) 04/05/2019     04/05/2019    K 4.3 04/05/2019     04/05/2019    CO2 25 04/05/2019         Anthropometric Measurements: Wt: 138 lbs. Wt Readings from Last 3 Encounters:   04/16/19 138 lb 6.4 oz (62.8 kg)   04/08/19 136 lb 6.4 oz (61.9 kg)   01/08/19 139 lb 9.6 oz (63.3 kg)      BMI: 23.76kg/m2  BMI Readings from Last 3 Encounters:   04/16/19 23.76 kg/m²   04/08/19 23.41 kg/m²   01/08/19 23.96 kg/m²     Patient's stated goal weight:   7% Weight loss goal weight:     Nutrition-Focused Physical Findings:  General Appearance: well nourished, well kept   Affect: alert and interested in her own self-care    Food and Nutrition History:   Nutrition Awareness/Previous DSMES: 1 month ago  Beverage consumption: water, occasional diet pop  Alcohol consumption: seldom  Food Availability: secure  Usual Food consumption: 3 meals with 20-25 grams carbohydrate with meals  And 10-15 grams carb with snacks     Hypoglycemia is occurring at fasting readings 4 times in 1 month.      Physical Activity History:   Physical activity: yes  Frequency of activity: 3 per week  Duration of activity: 30 minutes  Obstacles

## 2019-05-13 RX ORDER — PEN NEEDLE, DIABETIC 31 GX3/16"
NEEDLE, DISPOSABLE MISCELLANEOUS
Qty: 200 EACH | Refills: 1 | Status: SHIPPED | OUTPATIENT
Start: 2019-05-13 | End: 2019-07-30 | Stop reason: SDUPTHER

## 2019-06-24 ENCOUNTER — OFFICE VISIT (OUTPATIENT)
Dept: DERMATOLOGY | Age: 74
End: 2019-06-24
Payer: MEDICARE

## 2019-06-24 DIAGNOSIS — L57.0 ACTINIC KERATOSIS: ICD-10-CM

## 2019-06-24 DIAGNOSIS — L65.9 ALOPECIA: ICD-10-CM

## 2019-06-24 DIAGNOSIS — D48.9 NEOPLASM OF UNCERTAIN BEHAVIOR: Primary | ICD-10-CM

## 2019-06-24 DIAGNOSIS — D22.9 MULTIPLE BENIGN MELANOCYTIC NEVI: ICD-10-CM

## 2019-06-24 DIAGNOSIS — L72.0 MILIA: ICD-10-CM

## 2019-06-24 DIAGNOSIS — Z85.828 HISTORY OF BASAL CELL CARCINOMA: ICD-10-CM

## 2019-06-24 PROCEDURE — 1036F TOBACCO NON-USER: CPT | Performed by: DERMATOLOGY

## 2019-06-24 PROCEDURE — 99214 OFFICE O/P EST MOD 30 MIN: CPT | Performed by: DERMATOLOGY

## 2019-06-24 PROCEDURE — 17000 DESTRUCT PREMALG LESION: CPT | Performed by: DERMATOLOGY

## 2019-06-24 PROCEDURE — 4040F PNEUMOC VAC/ADMIN/RCVD: CPT | Performed by: DERMATOLOGY

## 2019-06-24 PROCEDURE — G8427 DOCREV CUR MEDS BY ELIG CLIN: HCPCS | Performed by: DERMATOLOGY

## 2019-06-24 PROCEDURE — 1090F PRES/ABSN URINE INCON ASSESS: CPT | Performed by: DERMATOLOGY

## 2019-06-24 PROCEDURE — 11102 TANGNTL BX SKIN SINGLE LES: CPT | Performed by: DERMATOLOGY

## 2019-06-24 PROCEDURE — G8400 PT W/DXA NO RESULTS DOC: HCPCS | Performed by: DERMATOLOGY

## 2019-06-24 PROCEDURE — 1123F ACP DISCUSS/DSCN MKR DOCD: CPT | Performed by: DERMATOLOGY

## 2019-06-24 PROCEDURE — 3017F COLORECTAL CA SCREEN DOC REV: CPT | Performed by: DERMATOLOGY

## 2019-06-24 PROCEDURE — G8420 CALC BMI NORM PARAMETERS: HCPCS | Performed by: DERMATOLOGY

## 2019-06-24 NOTE — PATIENT INSTRUCTIONS
Sun Protection Tips    · Apply broad spectrum water resistant sunscreen with an SPF of at least 30 to exposed areas of the skin. Dont forget the ears and lips! Remember to reapply sunscreen about every 2 hours and after swimming or sweating. · Wear sun protective clothing. Swim shirts (aka. rash guards) are a great idea and negates the need to reapply sunscreen in those areas. · Seek the shade whenever possible especially between the hours of 10am and 4pm when the suns rays are the strongest.     · Avoid tanning beds    · Wear a wide brim hat while in the sun    Biopsy Wound Care Instructions   Cleanse the wound with mild soapy water daily.  Gently dry the area.  Apply Vaseline or petroleum jelly to the wound using a cotton tipped applicator or Qtip.  Cover with a clean bandage.  Repeat this process until the biopsy site is healed.  You may shower and bathe as usual.   ** Biopsy results generally take around 7 business days to come back. If you have not heard from us by then, please call the office at [phone] between 8AM and 4PM Monday through Friday. Cryosurgery (Freezing) Wound Care Instructions    AFTER THE PROCEDURE:    You will notice swelling and redness around the site. This is normal.    You may experience a sharp or sore feeling for the next several days. For this discomfort, you may take acetaminophen (Tylenol©).  A blister may develop at the treated area, sometimes as soon as by the end of the day. After several days, the blister will subside and a scab will form.  If the area is bumped or traumatized during the first few days following freezing, you may develop bleeding into the blister, forming a blood blister. This is nothing to be alarmed about.    If the blister is tense, uncomfortable, or much larger than the site that was frozen, you may pop the blister along its edge with a sterile needle (boiled, heated under a flame, or cleaned with alcohol) to allow the fluid to drain out. If the blister does not bother you, no treatment is needed.  Do NOT peel off the top of the blister roof. It will act as a dressing on top of your wound. WOUND CARE:    You may shower or bathe as usual, but avoid scrubbing the areas that have been frozen.  Cleanse the site twice a day with mild soapy water, and then apply a thin film of white petrolatum (Vaseline©).  You do not need to cover the area, but can if you prefer.  Do NOT allow the site to become dry or crusted, or attempt to dry it out with rubbing alcohol or hydrogen peroxide.  Continue this regimen until the area is pink and healed. Depending on the size and location of your cryosurgery site, healing may take 2 to 4 weeks.  The area may continue to be pink for several weeks, and over the next few months may become darker or lighter than the surrounding skin. This may be a permanent change.

## 2019-06-24 NOTE — PROGRESS NOTES
[facility]) Dermatology  Nuria Morrissey DO, [address]       Ba Red  1945    72 y.o. female     Date of Visit: 2019    Chief Complaint:   Chief Complaint   Patient presents with    Skin Exam     6 month TBSE, no new concerns         I was asked to see this patient by Dr. Zuñiga ref. provider found. History of Present Illness:  Ba Red is a 72 y.o. female who presents with the chief complaint of the followin. Total body skin cancer screening exam.  History of BCC, denies recurrence. 2.Many year history of multiple nevi on the head/neck, trunk and extremities, all present for many years. Denies new moles. Denies moles changing in size, shape, color. None associated w/ pain, bleeding, pruritus. 3.  Has a scaly pink spot to her left temple, patient unaware  4. 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 Patient thinks her alopecia is improving with regrowth of hair to scalp since using shampoo with biotin and it recommended by her . Continues to decline Rogaine 5% foam as she is nervous about the side effects written and fine print on the bottle. Denies increase in hair shedding. Denies pruritus, irritation, burning, tenderness to her scalp. Has noticed loss of eyebrows slowly over at least the last several months. States she is overall not bothered by the appearance of her hair on her scalp at this point. 5.  Feels a small firm white bump to her right outer upper eyelid that has been present for at least 1 to 2 months. Attempts to pick the bump with her fingernails but it does not resolve. 6.  History of actinic keratosis to left dorsal hand treated with cryotherapy 6 months ago. Patient states it seems to be returning as the area is scaly but denies bleeding, pain, tenderness, pruritus.       Review of Systems:  Constitutional: Reports general sense of well-being   Skin: No new or changing moles, no history of keloids or hypertrophic scars. Heme: No abnormal bruising or bleeding. Past Medical History, Family History, Surgical History, Medications and Allergies reviewed. Past Skin Hx:  -12/2018-hx of BCC located to left nasal alar crease treated with Mohs surgery  -History of androgenetic alopecia-plan treatment with Rogaine 5% foam, prefers to use a shampoo with biotin in it.  -History of actinic keratoses status post cryotherapy  -History of lentigines  Patient denies past history of melanoma, NMSC, dysplastic nevi, or chronic skin rashes. PFHx: Denies hx of MM or NMSC    Family History   Problem Relation Age of Onset    High Blood Pressure Mother     High Cholesterol Mother     Depression Mother     High Blood Pressure Brother     High Cholesterol Brother     Diabetes Maternal Grandmother      Past Medical History:   Diagnosis Date    Anesthesia     hx of reaction to medications can have \"opposite\" effect with sedatives and causes patient to be jittery, agitated. Patient has concern what will be given.      Arthritis     Diabetic eye exam ([facility] [address] 08.24.15    Dr Marshall Double Diabetic [facility] exam [facility]) 01/11/2017    Diabetic eye exam ([facility] [address] 01/09/2019    Hyperlipidemia     Hypertension     Pneumonia 1996    Type II or unspecified type diabetes mellitus without mention of complication, not stated as uncontrolled      Past Surgical History:   Procedure Laterality Date    CARPAL TUNNEL RELEASE      bilateral    COLONOSCOPY  5/2013    HYSTERECTOMY      partial    MASTECTOMY, PARTIAL Left 07/18/2013    LEFT BREAST NEEDLE LOCALIZED PARTIAL MASTECTOMY EXCISIONAL    OTHER SURGICAL HISTORY      medications for sedation can make her jittery    TUBAL LIGATION         Allergies   Allergen Reactions    Benadryl [Diphenhydramine]     Erythromycin Nausea And Vomiting     Outpatient Medications Marked as Taking for the 6/24/19 encounter (Office Visit) with Maya Apodaca,    Medication Sig Dispense Refill    insulin glargine (BASAGLAR KWIKPEN) 100 UNIT/ML injection pen INJECT 42 UNITS DAILY 15 mL 3    SURE COMFORT PEN NEEDLES 31G X 5 MM MISC USE WITH LANTUS SOLOSTAR PENS 200 each 1    Biotin 40311 MCG TABS Take by mouth      timolol (TIMOPTIC) 0.25 % ophthalmic solution 1 drop 2 times daily      bimatoprost (LUMIGAN) 0.01 % SOLN ophthalmic drops Place 1 drop into both eyes nightly      lisinopril (PRINIVIL;ZESTRIL) 10 MG tablet Take 1 tablet by mouth daily 90 tablet 1    empagliflozin (JARDIANCE) 25 MG tablet TAKE ONE TABLET BY MOUTH DAILY 90 tablet 1    VICTOZA 18 MG/3ML SOPN SC injection INJECT (1.8MG) SUBCUTANEOUSLY DAILY 9 mL 2    metFORMIN (GLUCOPHAGE-XR) 500 MG extended release tablet TAKE ONE TABLET BY MOUTH TWICE DAILY (TAKE WITH FOOD) 180 tablet 1    fenofibrate 160 MG tablet TAKE ONE TABLET BY MOUTH DAILY 90 tablet 1    rosuvastatin (CRESTOR) 40 MG tablet TAKE ONE TABLET BY MOUTH EACH EVENING 90 tablet 1    Blood Glucose Monitoring Suppl MARIA ELENA Use to check sugar daily MAY SUBSTITUTE 1 Device 3    Glucose Blood (BLOOD GLUCOSE TEST STRIPS) STRP Use to check sugar  Once daily MAY SUBSTITUTE PER INSURANCE 100 strip 3    Lancets MISC Use to check sugar once daily MAY SUBSTITUTE PER INSURANCE 100 each 3    Insulin Pen Needle 32G X 4 MM MISC 1 each by Does not apply route daily 100 each 3    melatonin (RA MELATONIN) 3 MG TABS tablet Take 3 mg by mouth daily   3    Cholecalciferol (VITAMIN D3) 400 UNIT/ML LIQD Take 5,000 Int'l Units by mouth daily.          Social History:   Social History     Socioeconomic History    Marital status:      Spouse name: Not on file    Number of children: Not on file    Years of education: Not on file    Highest education level: Not on file   Occupational History    Not on file   Social Needs    Financial resource strain: Not on file    Food insecurity:     Worry: Not on file     Inability: Not on file    Transportation needs:     Medical: Not on file     Non-medical: Not on file   Tobacco Use    Smoking status: Former Smoker     Packs/day: 0.25     Years: 51.00     Pack years: 12.75     Types: Cigarettes     Last attempt to quit: 3/9/2016     Years since quitting: 3.2    Smokeless tobacco: Never Used    Tobacco comment: intermittent smoking over last 46 yrs. Fernie Crane has a cigarette now. Quit routine smoking 1996   Substance and Sexual Activity    Alcohol use: Yes     Alcohol/week: 0.0 oz     Comment: Occas. x 2-3/ month    Drug use: No    Sexual activity: Not on file   Lifestyle    Physical activity:     Days per week: Not on file     Minutes per session: Not on file    Stress: Not on file   Relationships    Social connections:     Talks on phone: Not on file     Gets together: Not on file     Attends [employer] service: Not on file     Active member of club or organization: Not on file     Attends meetings of clubs or organizations: Not on file     Relationship status: Not on file    Intimate partner violence:     Fear of current or ex partner: Not on file     Emotionally abused: Not on file     Physically abused: Not on file     Forced sexual activity: Not on file   Other Topics Concern    Not on file   Social History Narrative    Not on file       Physical Examination     The following were examined and determined to be normal: Psych/Neuro, Conjunctivae/eyelids, Gums/teeth/lips, Neck, Breast/axilla/chest, Abdomen, Back, RUE, RLE, LLE and Nails/digits. buttocks. Areas covered by underwear garment(s) not examined. The following were examined and determined to be abnormal: Scalp/hair, Head/face and LUE. -General: A+Ox3, NAD, well-nourished, well-developed. Total body skin exam performed, areas examined listed above:   1. Left lateral dorsal hand- 1.4x0.8cm irregularly bordered pink to mildly erythematous scaly papule      2.  Left temple- ill defined irreg shaped gritty keratotic pink macule(s)   3. Scattered on the head,neck, trunk Discussed wound care. -Reviewed sun protective behavior -- sun avoidance during the peak hours of the day, sun-protective clothing (including hat and sunglasses), sunscreen use (water resistant, broad spectrum, SPF at least 30, need for reapplication every 2 to 3 hours). -Patient to contact office if AK fails to resolve despite treatment, or if patient develops side effect from therapy, such as unbearable crusting, scabbing, redness, or tenderness.    - GA DESTRUC PREMALIGNANT, FIRST LESION    3. Multiple benign melanocytic nevi  Benign acquired melanocytic nevi  -Recommend monthly self skin exams   -Educated regarding the ABCDEs of melanoma detection   -Call for any new/changing moles or concerning lesions  -Reviewed sun protective behavior -- sun avoidance during the peak hours of the day, sun-protective clothing (including hat and sunglasses), sunscreen use (water resistant, broad spectrum, SPF at least 30, need for reapplication every 2 to 3 hours), avoidance of tanning beds   -Plan: Observation with annual skin checks (earlier if indicated) performed in office to monitor current nevi and to assess for new lesions. 4. 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 Frontal fibrosing alopecia)  -discussed changes visualized to scalp in detail with patient, possible ddx and treatment options. 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 Recommend punch biopsy but patient declined today. She prefers to observe her scalp over the next 6 months and will revisit this issue and possibility of a biopsy at her follow-up in December 2019.  -I discussed with patient that if he cicatricial alopecia is occurring, it is best to intervene early to attempt to slow down progression of disease but patient declines intervention or biopsy today.     5. Milia  -Reassurance, benign small cyst.  No treatment is necessary     6. History of basal cell carcinoma  No evidence of recurrence  RTC 6 months      25 minutes was spent with the patient discussing dx, findings/test results, reviewing chart, tx options, side effects, patient progress, and follow up at least 50% in face to face consultation. Return to Clinic: 6 months skin exam, recheck alopecia  Discussed plan with patient and/or primary caretaker. Patient to call clinic with any questions / concerns. Reviewed side effects of treatment(s) and/or medication(s) with patient and/or primary caretaker. AVS provided or is available on [employer]     Note is transcribed using voice recognition software. Inadvertent computerized transcription errors may be present. Return for as scheduled.

## 2019-06-27 LAB — DERMATOLOGY PATHOLOGY REPORT: NORMAL

## 2019-07-02 ENCOUNTER — TELEPHONE (OUTPATIENT)
Dept: DERMATOLOGY | Age: 74
End: 2019-07-02

## 2019-07-08 RX ORDER — LIRAGLUTIDE 6 MG/ML
INJECTION SUBCUTANEOUS
Qty: 9 ML | Refills: 2 | Status: SHIPPED | OUTPATIENT
Start: 2019-07-08 | End: 2019-11-02 | Stop reason: SDUPTHER

## 2019-07-09 ENCOUNTER — OFFICE VISIT (OUTPATIENT)
Dept: ENDOCRINOLOGY | Age: 74
End: 2019-07-09
Payer: MEDICARE

## 2019-07-09 VITALS
SYSTOLIC BLOOD PRESSURE: 94 MMHG | DIASTOLIC BLOOD PRESSURE: 59 MMHG | HEIGHT: 64 IN | OXYGEN SATURATION: 98 % | HEART RATE: 82 BPM | WEIGHT: 136.4 LBS | BODY MASS INDEX: 23.29 KG/M2 | RESPIRATION RATE: 16 BRPM

## 2019-07-09 DIAGNOSIS — E78.49 OTHER HYPERLIPIDEMIA: ICD-10-CM

## 2019-07-09 DIAGNOSIS — I10 ESSENTIAL HYPERTENSION: ICD-10-CM

## 2019-07-09 LAB — HBA1C MFR BLD: 6.3 %

## 2019-07-09 PROCEDURE — G8427 DOCREV CUR MEDS BY ELIG CLIN: HCPCS | Performed by: INTERNAL MEDICINE

## 2019-07-09 PROCEDURE — 2022F DILAT RTA XM EVC RTNOPTHY: CPT | Performed by: INTERNAL MEDICINE

## 2019-07-09 PROCEDURE — 3044F HG A1C LEVEL LT 7.0%: CPT | Performed by: INTERNAL MEDICINE

## 2019-07-09 PROCEDURE — 1036F TOBACCO NON-USER: CPT | Performed by: INTERNAL MEDICINE

## 2019-07-09 PROCEDURE — 83036 HEMOGLOBIN GLYCOSYLATED A1C: CPT | Performed by: INTERNAL MEDICINE

## 2019-07-09 PROCEDURE — 4040F PNEUMOC VAC/ADMIN/RCVD: CPT | Performed by: INTERNAL MEDICINE

## 2019-07-09 PROCEDURE — G8400 PT W/DXA NO RESULTS DOC: HCPCS | Performed by: INTERNAL MEDICINE

## 2019-07-09 PROCEDURE — 1123F ACP DISCUSS/DSCN MKR DOCD: CPT | Performed by: INTERNAL MEDICINE

## 2019-07-09 PROCEDURE — 3017F COLORECTAL CA SCREEN DOC REV: CPT | Performed by: INTERNAL MEDICINE

## 2019-07-09 PROCEDURE — 99214 OFFICE O/P EST MOD 30 MIN: CPT | Performed by: INTERNAL MEDICINE

## 2019-07-09 PROCEDURE — G8420 CALC BMI NORM PARAMETERS: HCPCS | Performed by: INTERNAL MEDICINE

## 2019-07-09 PROCEDURE — 1090F PRES/ABSN URINE INCON ASSESS: CPT | Performed by: INTERNAL MEDICINE

## 2019-07-09 RX ORDER — GLUCOSAMINE HCL/CHONDROITIN SU 500-400 MG
CAPSULE ORAL
Qty: 100 STRIP | Refills: 3 | Status: SHIPPED | OUTPATIENT
Start: 2019-07-09 | End: 2020-01-07

## 2019-07-09 NOTE — PROGRESS NOTES
goal  3. HLD: LDL at goal  4. Hair loss : DHEA-S, TSH nl. Testosterone low, discussed it is unlikely cause of her hair loss. Advised to see dermatology to rule out any other causes. Plan:       basaglar 42 ---> 38 units daily, advised self titration    continue victoza   Continue metformin   May need prandial insulin, she would like to avoid   Advise to check blood sugar 1 -2 times a day   Patient to send blood sugar log for titration. Advise to exercise regularly. Advise to low simple carbohydrate and protein with each  meal diet. Diabetes Care: recommend yearly eye exam, foot exam and urine microalbumin to   creatinine ratio. Patient is up-to-date.         F/u in 6 months as will see PCP

## 2019-07-30 DIAGNOSIS — E78.2 MIXED HYPERLIPIDEMIA: ICD-10-CM

## 2019-07-30 RX ORDER — FENOFIBRATE 160 MG/1
TABLET ORAL
Qty: 90 TABLET | Refills: 1 | Status: SHIPPED | OUTPATIENT
Start: 2019-07-30 | End: 2020-01-22

## 2019-07-30 RX ORDER — METFORMIN HYDROCHLORIDE 500 MG/1
TABLET, EXTENDED RELEASE ORAL
Qty: 180 TABLET | Refills: 1 | Status: SHIPPED | OUTPATIENT
Start: 2019-07-30 | End: 2020-01-22

## 2019-07-30 RX ORDER — ROSUVASTATIN CALCIUM 40 MG/1
TABLET, COATED ORAL
Qty: 90 TABLET | Refills: 1 | Status: SHIPPED | OUTPATIENT
Start: 2019-07-30 | End: 2020-01-22

## 2019-07-30 RX ORDER — PEN NEEDLE, DIABETIC 31 GX3/16"
NEEDLE, DISPOSABLE MISCELLANEOUS
Qty: 200 EACH | Refills: 2 | Status: SHIPPED | OUTPATIENT
Start: 2019-07-30 | End: 2020-04-28

## 2019-08-23 ENCOUNTER — PROCEDURE VISIT (OUTPATIENT)
Dept: DERMATOLOGY | Age: 74
End: 2019-08-23
Payer: MEDICARE

## 2019-08-23 DIAGNOSIS — L57.0 ACTINIC KERATOSES: Primary | ICD-10-CM

## 2019-08-23 PROCEDURE — 17003 DESTRUCT PREMALG LES 2-14: CPT | Performed by: DERMATOLOGY

## 2019-08-23 PROCEDURE — 17000 DESTRUCT PREMALG LESION: CPT | Performed by: DERMATOLOGY

## 2019-09-20 DIAGNOSIS — E11.9 INSULIN DEPENDENT TYPE 2 DIABETES MELLITUS (HCC): ICD-10-CM

## 2019-09-20 DIAGNOSIS — Z79.4 INSULIN DEPENDENT TYPE 2 DIABETES MELLITUS (HCC): ICD-10-CM

## 2019-09-25 ENCOUNTER — PATIENT MESSAGE (OUTPATIENT)
Dept: FAMILY MEDICINE CLINIC | Age: 74
End: 2019-09-25

## 2019-09-26 RX ORDER — CIPROFLOXACIN 500 MG/1
500 TABLET, FILM COATED ORAL 2 TIMES DAILY
Qty: 6 TABLET | Refills: 0 | Status: SHIPPED | OUTPATIENT
Start: 2019-09-26 | End: 2019-09-29

## 2019-10-15 ENCOUNTER — OFFICE VISIT (OUTPATIENT)
Dept: FAMILY MEDICINE CLINIC | Age: 74
End: 2019-10-15
Payer: MEDICARE

## 2019-10-15 VITALS
OXYGEN SATURATION: 95 % | TEMPERATURE: 98.2 F | DIASTOLIC BLOOD PRESSURE: 59 MMHG | BODY MASS INDEX: 23.28 KG/M2 | WEIGHT: 135.6 LBS | HEART RATE: 77 BPM | SYSTOLIC BLOOD PRESSURE: 101 MMHG

## 2019-10-15 DIAGNOSIS — Z23 NEED FOR INFLUENZA VACCINATION: ICD-10-CM

## 2019-10-15 DIAGNOSIS — E11.9 DM TYPE 2, GOAL HBA1C < 7% (HCC): Primary | ICD-10-CM

## 2019-10-15 PROCEDURE — G8420 CALC BMI NORM PARAMETERS: HCPCS | Performed by: FAMILY MEDICINE

## 2019-10-15 PROCEDURE — G8482 FLU IMMUNIZE ORDER/ADMIN: HCPCS | Performed by: FAMILY MEDICINE

## 2019-10-15 PROCEDURE — G8427 DOCREV CUR MEDS BY ELIG CLIN: HCPCS | Performed by: FAMILY MEDICINE

## 2019-10-15 PROCEDURE — 1090F PRES/ABSN URINE INCON ASSESS: CPT | Performed by: FAMILY MEDICINE

## 2019-10-15 PROCEDURE — 99213 OFFICE O/P EST LOW 20 MIN: CPT | Performed by: FAMILY MEDICINE

## 2019-10-15 PROCEDURE — 1123F ACP DISCUSS/DSCN MKR DOCD: CPT | Performed by: FAMILY MEDICINE

## 2019-10-15 PROCEDURE — 3017F COLORECTAL CA SCREEN DOC REV: CPT | Performed by: FAMILY MEDICINE

## 2019-10-15 PROCEDURE — 90662 IIV NO PRSV INCREASED AG IM: CPT | Performed by: FAMILY MEDICINE

## 2019-10-15 PROCEDURE — 1036F TOBACCO NON-USER: CPT | Performed by: FAMILY MEDICINE

## 2019-10-15 PROCEDURE — G0008 ADMIN INFLUENZA VIRUS VAC: HCPCS | Performed by: FAMILY MEDICINE

## 2019-10-15 PROCEDURE — G8400 PT W/DXA NO RESULTS DOC: HCPCS | Performed by: FAMILY MEDICINE

## 2019-10-15 PROCEDURE — 3044F HG A1C LEVEL LT 7.0%: CPT | Performed by: FAMILY MEDICINE

## 2019-10-15 PROCEDURE — 2022F DILAT RTA XM EVC RTNOPTHY: CPT | Performed by: FAMILY MEDICINE

## 2019-10-15 PROCEDURE — 4040F PNEUMOC VAC/ADMIN/RCVD: CPT | Performed by: FAMILY MEDICINE

## 2019-10-15 RX ORDER — FLASH GLUCOSE SCANNING READER
1 EACH MISCELLANEOUS
Qty: 1 DEVICE | Refills: 0 | Status: SHIPPED | OUTPATIENT
Start: 2019-10-15 | End: 2020-09-29

## 2019-10-15 RX ORDER — FLASH GLUCOSE SENSOR
1 KIT MISCELLANEOUS
Qty: 6 EACH | Refills: 2 | Status: SHIPPED | OUTPATIENT
Start: 2019-10-15 | End: 2020-09-29

## 2019-11-04 RX ORDER — LISINOPRIL 10 MG/1
TABLET ORAL
Qty: 90 TABLET | Refills: 1 | Status: SHIPPED | OUTPATIENT
Start: 2019-11-04 | End: 2020-07-29

## 2019-11-04 RX ORDER — LIRAGLUTIDE 6 MG/ML
INJECTION SUBCUTANEOUS
Qty: 9 ML | Refills: 2 | Status: SHIPPED | OUTPATIENT
Start: 2019-11-04 | End: 2020-01-22

## 2019-12-17 ENCOUNTER — OFFICE VISIT (OUTPATIENT)
Dept: DERMATOLOGY | Age: 74
End: 2019-12-17
Payer: MEDICARE

## 2019-12-17 DIAGNOSIS — L57.0 ACTINIC KERATOSIS: Primary | ICD-10-CM

## 2019-12-17 DIAGNOSIS — L65.9 ALOPECIA: ICD-10-CM

## 2019-12-17 DIAGNOSIS — Z85.828 HISTORY OF BASAL CELL CARCINOMA: ICD-10-CM

## 2019-12-17 DIAGNOSIS — L82.1 SEBORRHEIC KERATOSES: ICD-10-CM

## 2019-12-17 DIAGNOSIS — D22.9 MULTIPLE BENIGN MELANOCYTIC NEVI: ICD-10-CM

## 2019-12-17 PROCEDURE — G8427 DOCREV CUR MEDS BY ELIG CLIN: HCPCS | Performed by: DERMATOLOGY

## 2019-12-17 PROCEDURE — G8420 CALC BMI NORM PARAMETERS: HCPCS | Performed by: DERMATOLOGY

## 2019-12-17 PROCEDURE — 3017F COLORECTAL CA SCREEN DOC REV: CPT | Performed by: DERMATOLOGY

## 2019-12-17 PROCEDURE — 4040F PNEUMOC VAC/ADMIN/RCVD: CPT | Performed by: DERMATOLOGY

## 2019-12-17 PROCEDURE — 1090F PRES/ABSN URINE INCON ASSESS: CPT | Performed by: DERMATOLOGY

## 2019-12-17 PROCEDURE — 17000 DESTRUCT PREMALG LESION: CPT | Performed by: DERMATOLOGY

## 2019-12-17 PROCEDURE — G8400 PT W/DXA NO RESULTS DOC: HCPCS | Performed by: DERMATOLOGY

## 2019-12-17 PROCEDURE — 1123F ACP DISCUSS/DSCN MKR DOCD: CPT | Performed by: DERMATOLOGY

## 2019-12-17 PROCEDURE — G8482 FLU IMMUNIZE ORDER/ADMIN: HCPCS | Performed by: DERMATOLOGY

## 2019-12-17 PROCEDURE — 1036F TOBACCO NON-USER: CPT | Performed by: DERMATOLOGY

## 2019-12-17 PROCEDURE — 99213 OFFICE O/P EST LOW 20 MIN: CPT | Performed by: DERMATOLOGY

## 2020-01-13 ENCOUNTER — OFFICE VISIT (OUTPATIENT)
Dept: ENDOCRINOLOGY | Age: 75
End: 2020-01-13
Payer: MEDICARE

## 2020-01-13 VITALS
OXYGEN SATURATION: 99 % | DIASTOLIC BLOOD PRESSURE: 75 MMHG | WEIGHT: 134.8 LBS | HEIGHT: 64 IN | HEART RATE: 78 BPM | SYSTOLIC BLOOD PRESSURE: 121 MMHG | BODY MASS INDEX: 23.01 KG/M2

## 2020-01-13 LAB
CREATININE URINE: 62.3 MG/DL (ref 28–259)
HBA1C MFR BLD: 7.1 %
MICROALBUMIN UR-MCNC: <1.2 MG/DL
MICROALBUMIN/CREAT UR-RTO: NORMAL MG/G (ref 0–30)

## 2020-01-13 PROCEDURE — 1090F PRES/ABSN URINE INCON ASSESS: CPT | Performed by: INTERNAL MEDICINE

## 2020-01-13 PROCEDURE — 1123F ACP DISCUSS/DSCN MKR DOCD: CPT | Performed by: INTERNAL MEDICINE

## 2020-01-13 PROCEDURE — G8427 DOCREV CUR MEDS BY ELIG CLIN: HCPCS | Performed by: INTERNAL MEDICINE

## 2020-01-13 PROCEDURE — 1036F TOBACCO NON-USER: CPT | Performed by: INTERNAL MEDICINE

## 2020-01-13 PROCEDURE — 3017F COLORECTAL CA SCREEN DOC REV: CPT | Performed by: INTERNAL MEDICINE

## 2020-01-13 PROCEDURE — G8482 FLU IMMUNIZE ORDER/ADMIN: HCPCS | Performed by: INTERNAL MEDICINE

## 2020-01-13 PROCEDURE — 99214 OFFICE O/P EST MOD 30 MIN: CPT | Performed by: INTERNAL MEDICINE

## 2020-01-13 PROCEDURE — 83036 HEMOGLOBIN GLYCOSYLATED A1C: CPT | Performed by: INTERNAL MEDICINE

## 2020-01-13 PROCEDURE — 2022F DILAT RTA XM EVC RTNOPTHY: CPT | Performed by: INTERNAL MEDICINE

## 2020-01-13 PROCEDURE — 4040F PNEUMOC VAC/ADMIN/RCVD: CPT | Performed by: INTERNAL MEDICINE

## 2020-01-13 PROCEDURE — G8420 CALC BMI NORM PARAMETERS: HCPCS | Performed by: INTERNAL MEDICINE

## 2020-01-13 PROCEDURE — G8400 PT W/DXA NO RESULTS DOC: HCPCS | Performed by: INTERNAL MEDICINE

## 2020-01-13 NOTE — PROGRESS NOTES
(VITAMIN D3) 400 UNIT/ML LIQD Take 5,000 Int'l Units by mouth daily. No current facility-administered medications for this visit. Review of Systems  Please see scanned document dated and signed      Objective:      /75 (Site: Right Upper Arm, Position: Sitting, Cuff Size: Medium Adult)   Pulse 78   Ht 5' 4\" (1.626 m)   Wt 134 lb 12.8 oz (61.1 kg)   LMP  (LMP Unknown)   SpO2 99%   BMI 23.14 kg/m²   Wt Readings from Last 3 Encounters:   01/13/20 134 lb 12.8 oz (61.1 kg)   10/15/19 135 lb 9.6 oz (61.5 kg)   07/09/19 136 lb 6.4 oz (61.9 kg)     Constitutional: Well-developed, appears stated age, cooperative, in no acute distress  H/E/N/M/T:atraumatic, normocephalic, external ears, nose, lips normal without lesions  Eyes: Lids, lashes, conjunctivae and sclerae normal, No proptosis, no redness  Neck: supple, symmetrical, no swelling  Skin: No obvious rashes or lesions present. Skin and hair texture normal  Psychiatric: Judgement and Insight:  judgement and insight appear normal  Neuro: Normal without focal findings, speech is normal normal, speech is spontaneous  Chest: No labored breathing, no chest deformity, no stridor  Musculoskeletal: No joint deformity, swelling      4/19  Skeletal foot exam is normal, no skin lesions, toenails are normal, pulses are normal, 10 g monofilament is reduced big toes    Lab Reviewed   No components found for: CHLPL  Lab Results   Component Value Date    TRIG 112 04/05/2019    TRIG 134 10/24/2018    TRIG 181 (H) 07/18/2018     Lab Results   Component Value Date    HDL 37 (L) 04/05/2019    HDL 41 10/24/2018    HDL 45 07/18/2018     Lab Results   Component Value Date    LDLCALC 42 04/05/2019    LDLCALC 59 10/24/2018    LDLCALC 60 07/18/2018     Lab Results   Component Value Date    LABVLDL 22 04/05/2019    LABVLDL 27 10/24/2018    LABVLDL 36 07/18/2018     Lab Results   Component Value Date    LABA1C 6.3 07/09/2019       Assessment:     Maryann Miguel is a 69 y.o.

## 2020-01-15 LAB — DIABETIC RETINOPATHY: NEGATIVE

## 2020-01-22 RX ORDER — METFORMIN HYDROCHLORIDE 500 MG/1
TABLET, EXTENDED RELEASE ORAL
Qty: 180 TABLET | Refills: 1 | Status: SHIPPED | OUTPATIENT
Start: 2020-01-22 | End: 2021-01-16

## 2020-01-22 RX ORDER — FENOFIBRATE 160 MG/1
TABLET ORAL
Qty: 90 TABLET | Refills: 1 | Status: SHIPPED | OUTPATIENT
Start: 2020-01-22 | End: 2020-11-05

## 2020-01-22 RX ORDER — ROSUVASTATIN CALCIUM 40 MG/1
TABLET, COATED ORAL
Qty: 90 TABLET | Refills: 1 | Status: SHIPPED | OUTPATIENT
Start: 2020-01-22 | End: 2020-11-05

## 2020-01-22 RX ORDER — LIRAGLUTIDE 6 MG/ML
INJECTION SUBCUTANEOUS
Qty: 9 ML | Refills: 2 | Status: SHIPPED | OUTPATIENT
Start: 2020-01-22 | End: 2020-05-29

## 2020-03-13 RX ORDER — EMPAGLIFLOZIN 25 MG/1
TABLET, FILM COATED ORAL
Qty: 30 TABLET | Refills: 1 | Status: SHIPPED | OUTPATIENT
Start: 2020-03-13 | End: 2020-06-17

## 2020-04-28 RX ORDER — PEN NEEDLE, DIABETIC 31 GX3/16"
NEEDLE, DISPOSABLE MISCELLANEOUS
Qty: 100 EACH | Refills: 2 | Status: SHIPPED | OUTPATIENT
Start: 2020-04-28 | End: 2020-07-28

## 2020-05-29 RX ORDER — LIRAGLUTIDE 6 MG/ML
INJECTION SUBCUTANEOUS
Qty: 9 ML | Refills: 2 | Status: SHIPPED | OUTPATIENT
Start: 2020-05-29 | End: 2020-09-01

## 2020-06-17 ENCOUNTER — TELEPHONE (OUTPATIENT)
Dept: FAMILY MEDICINE CLINIC | Age: 75
End: 2020-06-17

## 2020-06-17 RX ORDER — EMPAGLIFLOZIN 25 MG/1
TABLET, FILM COATED ORAL
Qty: 30 TABLET | Refills: 1 | Status: SHIPPED | OUTPATIENT
Start: 2020-06-17 | End: 2020-09-01

## 2020-06-18 RX ORDER — EMPAGLIFLOZIN 25 MG/1
TABLET, FILM COATED ORAL
Qty: 30 TABLET | Refills: 1 | OUTPATIENT
Start: 2020-06-18

## 2020-07-20 DIAGNOSIS — I10 ESSENTIAL HYPERTENSION: ICD-10-CM

## 2020-07-20 DIAGNOSIS — E55.9 VITAMIN D DEFICIENCY: ICD-10-CM

## 2020-07-20 DIAGNOSIS — E11.9 INSULIN DEPENDENT TYPE 2 DIABETES MELLITUS (HCC): ICD-10-CM

## 2020-07-20 DIAGNOSIS — E78.2 MIXED HYPERLIPIDEMIA: ICD-10-CM

## 2020-07-20 DIAGNOSIS — Z79.4 INSULIN DEPENDENT TYPE 2 DIABETES MELLITUS (HCC): ICD-10-CM

## 2020-07-20 LAB
A/G RATIO: 2 (ref 1.1–2.2)
ALBUMIN SERPL-MCNC: 4.3 G/DL (ref 3.4–5)
ALP BLD-CCNC: 46 U/L (ref 40–129)
ALT SERPL-CCNC: 16 U/L (ref 10–40)
ANION GAP SERPL CALCULATED.3IONS-SCNC: 9 MMOL/L (ref 3–16)
AST SERPL-CCNC: 23 U/L (ref 15–37)
BILIRUB SERPL-MCNC: <0.2 MG/DL (ref 0–1)
BUN BLDV-MCNC: 24 MG/DL (ref 7–20)
CALCIUM SERPL-MCNC: 9.5 MG/DL (ref 8.3–10.6)
CHLORIDE BLD-SCNC: 105 MMOL/L (ref 99–110)
CHOLESTEROL, TOTAL: 117 MG/DL (ref 0–199)
CO2: 26 MMOL/L (ref 21–32)
CREAT SERPL-MCNC: 1 MG/DL (ref 0.6–1.2)
GFR AFRICAN AMERICAN: >60
GFR NON-AFRICAN AMERICAN: 54
GLOBULIN: 2.1 G/DL
GLUCOSE BLD-MCNC: 82 MG/DL (ref 70–99)
HCT VFR BLD CALC: 41.3 % (ref 36–48)
HDLC SERPL-MCNC: 41 MG/DL (ref 40–60)
HEMOGLOBIN: 13.2 G/DL (ref 12–16)
LDL CHOLESTEROL CALCULATED: 57 MG/DL
MCH RBC QN AUTO: 27.6 PG (ref 26–34)
MCHC RBC AUTO-ENTMCNC: 32.1 G/DL (ref 31–36)
MCV RBC AUTO: 86 FL (ref 80–100)
PDW BLD-RTO: 14.5 % (ref 12.4–15.4)
PLATELET # BLD: 208 K/UL (ref 135–450)
PMV BLD AUTO: 10.1 FL (ref 5–10.5)
POTASSIUM SERPL-SCNC: 4.7 MMOL/L (ref 3.5–5.1)
RBC # BLD: 4.8 M/UL (ref 4–5.2)
SODIUM BLD-SCNC: 140 MMOL/L (ref 136–145)
TOTAL PROTEIN: 6.4 G/DL (ref 6.4–8.2)
TRIGL SERPL-MCNC: 96 MG/DL (ref 0–150)
VITAMIN D 25-HYDROXY: 92.1 NG/ML
VLDLC SERPL CALC-MCNC: 19 MG/DL
WBC # BLD: 5.6 K/UL (ref 4–11)

## 2020-07-21 LAB
ESTIMATED AVERAGE GLUCOSE: 157.1 MG/DL
HBA1C MFR BLD: 7.1 %

## 2020-07-22 ENCOUNTER — OFFICE VISIT (OUTPATIENT)
Dept: ENDOCRINOLOGY | Age: 75
End: 2020-07-22
Payer: MEDICARE

## 2020-07-22 VITALS
BODY MASS INDEX: 23.12 KG/M2 | SYSTOLIC BLOOD PRESSURE: 111 MMHG | DIASTOLIC BLOOD PRESSURE: 66 MMHG | HEART RATE: 67 BPM | HEIGHT: 64 IN | OXYGEN SATURATION: 96 % | WEIGHT: 135.4 LBS | RESPIRATION RATE: 18 BRPM

## 2020-07-22 PROCEDURE — G8400 PT W/DXA NO RESULTS DOC: HCPCS | Performed by: INTERNAL MEDICINE

## 2020-07-22 PROCEDURE — 99214 OFFICE O/P EST MOD 30 MIN: CPT | Performed by: INTERNAL MEDICINE

## 2020-07-22 PROCEDURE — 1036F TOBACCO NON-USER: CPT | Performed by: INTERNAL MEDICINE

## 2020-07-22 PROCEDURE — 4040F PNEUMOC VAC/ADMIN/RCVD: CPT | Performed by: INTERNAL MEDICINE

## 2020-07-22 PROCEDURE — G8420 CALC BMI NORM PARAMETERS: HCPCS | Performed by: INTERNAL MEDICINE

## 2020-07-22 PROCEDURE — 2022F DILAT RTA XM EVC RTNOPTHY: CPT | Performed by: INTERNAL MEDICINE

## 2020-07-22 PROCEDURE — G8427 DOCREV CUR MEDS BY ELIG CLIN: HCPCS | Performed by: INTERNAL MEDICINE

## 2020-07-22 PROCEDURE — 3051F HG A1C>EQUAL 7.0%<8.0%: CPT | Performed by: INTERNAL MEDICINE

## 2020-07-22 PROCEDURE — 1090F PRES/ABSN URINE INCON ASSESS: CPT | Performed by: INTERNAL MEDICINE

## 2020-07-22 PROCEDURE — 1123F ACP DISCUSS/DSCN MKR DOCD: CPT | Performed by: INTERNAL MEDICINE

## 2020-07-22 PROCEDURE — 3017F COLORECTAL CA SCREEN DOC REV: CPT | Performed by: INTERNAL MEDICINE

## 2020-07-22 RX ORDER — INSULIN GLARGINE 100 [IU]/ML
INJECTION, SOLUTION SUBCUTANEOUS
Qty: 15 PEN | Refills: 3 | Status: SHIPPED | OUTPATIENT
Start: 2020-07-22 | End: 2020-11-30

## 2020-07-22 NOTE — PROGRESS NOTES
Seen as f/u    Interim:    Needs switch back to lantus  Occ diarrhea with metformin- irregular    Diagnosed with Type 2 diabetes mellitus in 2006  Known diabetic complications: Neuropathy    Current diabetic medications   Metformin 500mg ER BID can not tolerate higher dose  Jardiance  25mg  Victoza  1.8  Basaglar 40    h/o use of sulfonylurea/ TZD      Last A1c 7.1% <------  6.3%<------ 7.5%<------ 8.5%<------7.6%<------- 9%<------9.8 on 1/18 <--- 10.2 <--- 9.1    Prior visit with dietician: Yes   Current diet: on average, 3 meals per day   Current exercise: walking   Current monitoring regimen: home blood tests -1  times daily     Has brought blood glucose log/meter: yes  Home blood sugar records:  Any episodes of hypoglycemia? -    No Hx of CAD , PVD, CVA    Hyperlipidemia: controlled, taking statin  LDL 36 on 1/18   crestor 40mg fenofibrate 160mg  LDL 42 on 4/19  LDL 57 on 7/20  Last eye exam: 1/20  Last foot exam: 4.19  Last microalbumin to creatinine ratio: 1/20    HTN: Controlled, on  lisinopril 5mg    States hair loss for last 2 years    1/18 Testosterone < 3  DHEA-S 20  TSH 1.04    Calcium high in the past but normal since 2011 1/18 Ca 9.8  7/11  Ca 11.0  6/09 Ca 10.9    Seen by resident and me    Past Medical History:   Diagnosis Date    Anesthesia     hx of reaction to medications can have \"opposite\" effect with sedatives and causes patient to be jittery, agitated. Patient has concern what will be given.      Arthritis     Diabetic eye exam ([facility] [address] 08.24.15    Dr Dino Pires Diabetic eye exam [facility]) 01/11/2017    Diabetic eye exam ([facility] [address] 01/09/2019    Hyperlipidemia     Hypertension     Pneumonia 1996    Type II or unspecified type diabetes mellitus without mention of complication, not stated as uncontrolled      Past Surgical History:   Procedure Laterality Date    CARPAL TUNNEL RELEASE      bilateral    COLONOSCOPY  5/2013    HYSTERECTOMY      partial    MASTECTOMY, PARTIAL Left TABS tablet Take 3 mg by mouth daily   3    Cholecalciferol (VITAMIN D3) 400 UNIT/ML LIQD Take 5,000 Int'l Units by mouth daily. No current facility-administered medications for this visit. Review of Systems  Please see scanned document dated and signed      Objective:      /66 (Site: Left Upper Arm, Position: Sitting, Cuff Size: Medium Adult)   Pulse 67   Resp 18   Ht 5' 4\" (1.626 m)   Wt 135 lb 6.4 oz (61.4 kg)   LMP  (LMP Unknown)   SpO2 96%   Breastfeeding No   BMI 23.24 kg/m²   Wt Readings from Last 3 Encounters:   07/22/20 135 lb 6.4 oz (61.4 kg)   01/13/20 134 lb 12.8 oz (61.1 kg)   10/15/19 135 lb 9.6 oz (61.5 kg)     Constitutional: Well-developed, appears stated age, cooperative, in no acute distress  H/E/N/M/T:atraumatic, normocephalic, external ears, nose, lips normal without lesions  Eyes: Lids, lashes, conjunctivae and sclerae normal, No proptosis, no redness  Neck: supple, symmetrical, no swelling  Skin: No obvious rashes or lesions present.   Skin and hair texture normal  Psychiatric: Judgement and Insight:  judgement and insight appear normal  Neuro: Normal without focal findings, speech is normal normal, speech is spontaneous  Chest: No labored breathing, no chest deformity, no stridor  Musculoskeletal: No joint deformity, swelling      4/19  Skeletal foot exam is normal, no skin lesions, toenails are normal, pulses are normal, 10 g monofilament is reduced big toes    Lab Reviewed   No components found for: CHLPL  Lab Results   Component Value Date    TRIG 96 07/20/2020    TRIG 112 04/05/2019    TRIG 134 10/24/2018     Lab Results   Component Value Date    HDL 41 07/20/2020    HDL 37 (L) 04/05/2019    HDL 41 10/24/2018     Lab Results   Component Value Date    LDLCALC 57 07/20/2020    LDLCALC 42 04/05/2019    LDLCALC 59 10/24/2018     Lab Results   Component Value Date    LABVLDL 19 07/20/2020    LABVLDL 22 04/05/2019    LABVLDL 27 10/24/2018     Lab Results   Component Value Date    LABA1C 7.1 07/20/2020       Assessment:     Dewayne Hendrix is a 69 y.o. female with :    1.T2DM: Longstanding, uncontrolled, provided education. Discussed with patient given hyperglycemia, A1c, duration of DM, recommended basal insulin. She was hesitant but agreed after discussion. A1c slightly increased, will adjust basal dose as occasionally fasting hyperglycemia. Needs to check post prandial glucose. Advised 30 gm CHO with meal  Decrease metformin given GI issues  2. HTN : BP at goal  3. HLD: LDL at goal  4. Hair loss : DHEA-S, TSH nl. Testosterone low, discussed it is unlikely cause of her hair loss. Advised to see dermatology to rule out any other causes. 5.Hypercalcemia: Improved    Plan:       basaglar---> lantus 40 units daily, advised self titration    continue victoza   Continue metformin, change to once a day    May need prandial insulin, she would like to avoid   Advise to check blood sugar 1 -2 times a day   Patient to send blood sugar log for titration. Advise to exercise regularly. Advise to low simple carbohydrate and protein with each  meal diet. Diabetes Care: recommend yearly eye exam, foot exam and urine microalbumin to   creatinine ratio. Patient is up-to-date.         F/u in 6 months as will see PCP

## 2020-07-28 RX ORDER — PEN NEEDLE, DIABETIC 31 GX3/16"
NEEDLE, DISPOSABLE MISCELLANEOUS
Qty: 100 EACH | Refills: 0 | Status: SHIPPED | OUTPATIENT
Start: 2020-07-28 | End: 2021-04-12

## 2020-07-30 ENCOUNTER — TELEPHONE (OUTPATIENT)
Dept: FAMILY MEDICINE CLINIC | Age: 75
End: 2020-07-30

## 2020-07-30 NOTE — TELEPHONE ENCOUNTER
----- Message from Braxton Betty sent at 7/30/2020 11:37 AM EDT -----  Subject: Appointment Request    Reason for Call: Routine Pre-Op    QUESTIONS  Type of Appointment? Established Patient  Reason for appointment request? No appointments available during search  Additional Information for Provider? Patient stated that her eye surgery   is on Oct 6 2020 and need a appointment set up  ---------------------------------------------------------------------------  --------------  [address]  What is the best way for the office to contact you? OK to leave message on   voicemail  Preferred Call Back Phone Number? [phone]  ---------------------------------------------------------------------------  --------------  SCRIPT ANSWERS  Relationship to Patient? Self  Appointment reason? Symptomatic  Select script based on patient symptoms? Adult Pre-Op  Do you have question for your provider that need to be answered prior to   scheduling your pre-op appointment? No  Have you been diagnosed with   tested for   or told that you are suspected of having COVID-19 (Coronavirus)? No  Have you had a fever or taken medication to treat a fever within the past   3 days? No  Have you had a cough   shortness of breath or flu-like symptoms within the past 3 days? No  Do you currently have flu-like symptoms including fever or chills   cough   shortness of breath   or difficulty breathing   or new loss of taste or smell? No  (Service Expert  click yes below to proceed with [email or web address] As Usual   Scheduling)?  Yes

## 2020-08-05 ENCOUNTER — TELEPHONE (OUTPATIENT)
Dept: FAMILY MEDICINE CLINIC | Age: 75
End: 2020-08-05

## 2020-08-05 NOTE — TELEPHONE ENCOUNTER
As you know for preop purposes needs to be done in 30 day window. Since second eye surgery on Oct 20, I recommend Pre op anytime between Sept 20 and Oct 1.

## 2020-09-01 RX ORDER — EMPAGLIFLOZIN 25 MG/1
TABLET, FILM COATED ORAL
Qty: 30 TABLET | Refills: 1 | Status: SHIPPED | OUTPATIENT
Start: 2020-09-01 | End: 2020-11-30

## 2020-09-01 RX ORDER — LIRAGLUTIDE 6 MG/ML
INJECTION SUBCUTANEOUS
Qty: 9 ML | Refills: 2 | Status: SHIPPED | OUTPATIENT
Start: 2020-09-01 | End: 2021-01-16

## 2020-09-29 ENCOUNTER — OFFICE VISIT (OUTPATIENT)
Dept: FAMILY MEDICINE CLINIC | Age: 75
End: 2020-09-29
Payer: MEDICARE

## 2020-09-29 VITALS
DIASTOLIC BLOOD PRESSURE: 76 MMHG | HEIGHT: 65 IN | SYSTOLIC BLOOD PRESSURE: 118 MMHG | BODY MASS INDEX: 22.86 KG/M2 | WEIGHT: 137.2 LBS | OXYGEN SATURATION: 96 % | RESPIRATION RATE: 16 BRPM | TEMPERATURE: 96.2 F | HEART RATE: 66 BPM

## 2020-09-29 PROCEDURE — 1123F ACP DISCUSS/DSCN MKR DOCD: CPT | Performed by: FAMILY MEDICINE

## 2020-09-29 PROCEDURE — 3017F COLORECTAL CA SCREEN DOC REV: CPT | Performed by: FAMILY MEDICINE

## 2020-09-29 PROCEDURE — 90662 IIV NO PRSV INCREASED AG IM: CPT | Performed by: FAMILY MEDICINE

## 2020-09-29 PROCEDURE — 1090F PRES/ABSN URINE INCON ASSESS: CPT | Performed by: FAMILY MEDICINE

## 2020-09-29 PROCEDURE — 1036F TOBACCO NON-USER: CPT | Performed by: FAMILY MEDICINE

## 2020-09-29 PROCEDURE — 99213 OFFICE O/P EST LOW 20 MIN: CPT | Performed by: FAMILY MEDICINE

## 2020-09-29 PROCEDURE — 4040F PNEUMOC VAC/ADMIN/RCVD: CPT | Performed by: FAMILY MEDICINE

## 2020-09-29 PROCEDURE — G8427 DOCREV CUR MEDS BY ELIG CLIN: HCPCS | Performed by: FAMILY MEDICINE

## 2020-09-29 PROCEDURE — G8420 CALC BMI NORM PARAMETERS: HCPCS | Performed by: FAMILY MEDICINE

## 2020-09-29 PROCEDURE — G8400 PT W/DXA NO RESULTS DOC: HCPCS | Performed by: FAMILY MEDICINE

## 2020-09-29 PROCEDURE — G0008 ADMIN INFLUENZA VIRUS VAC: HCPCS | Performed by: FAMILY MEDICINE

## 2020-09-29 ASSESSMENT — PATIENT HEALTH QUESTIONNAIRE - PHQ9
2. FEELING DOWN, DEPRESSED OR HOPELESS: 1
SUM OF ALL RESPONSES TO PHQ9 QUESTIONS 1 & 2: 1
SUM OF ALL RESPONSES TO PHQ QUESTIONS 1-9: 1
SUM OF ALL RESPONSES TO PHQ QUESTIONS 1-9: 1
1. LITTLE INTEREST OR PLEASURE IN DOING THINGS: 0

## 2020-09-29 NOTE — PROGRESS NOTES
Preoperative Consultation      Sylvie Duran  YOB: 1945    Date of Service:  9/29/2020    Vitals:    09/29/20 1507   BP: 118/76   Pulse: 66   Resp: 16   Temp: 96.2 °F (35.7 °C)   TempSrc: Tympanic   SpO2: 96%   Weight: 137 lb 3.2 oz (62.2 kg)   Height: 5' 4.5\" (1.638 m)      Wt Readings from Last 2 Encounters:   09/29/20 137 lb 3.2 oz (62.2 kg)   07/22/20 135 lb 6.4 oz (61.4 kg)     BP Readings from Last 3 Encounters:   09/29/20 118/76   07/22/20 111/66   01/13/20 121/75        Chief Complaint   Patient presents with   Marko Cabral Pre-op Exam     cataract surgery.  left eye 10/6, right eye 10/20 w/ Dr. Syed Olivarez at [facility]     Allergies   Allergen Reactions    Benadryl [Diphenhydramine]     Erythromycin Nausea And Vomiting     Outpatient Medications Marked as Taking for the 9/29/20 encounter (Office Visit) with Corinne Cooler, DO   Medication Sig Dispense Refill    Blood Glucose Monitoring Suppl (TRUE METRIX METER) w/Device KIT 1 each by Does not apply route daily 1 kit 0    VICTOZA 18 MG/3ML SOPN SC injection INJECT 1.8MG SUBCUTANEOUSLY DAILY 9 mL 2    JARDIANCE 25 MG tablet TAKE ONE TABLET BY MOUTH DAILY 30 tablet 1    lisinopril (PRINIVIL;ZESTRIL) 10 MG tablet TAKE ONE TABLET DAILY 90 tablet 0    SURE COMFORT PEN NEEDLES 31G X 5 MM MISC USE WITH LANTUS SOLOSTAR PENS 100 each 0    insulin glargine (LANTUS SOLOSTAR) 100 UNIT/ML injection pen 40 units daily 15 pen 3    rosuvastatin (CRESTOR) 40 MG tablet TAKE ONE TABLET BY MOUTH EACH EVENING 90 tablet 1    metFORMIN (GLUCOPHAGE-XR) 500 MG extended release tablet TAKE ONE TABLET BY MOUTH TWICE DAILY (TAKE WITH FOOD) 180 tablet 1    fenofibrate 160 MG tablet TAKE ONE TABLET BY MOUTH DAILY 90 tablet 1    blood glucose test strips (TRUE METRIX BLOOD GLUCOSE TEST) strip USE TO CHECK SUGAR ONCE DAILY MAY SUBSTITUTE PER INSURANCE 100 strip 3    BL GLUCOSAMINE-CHONDROITIN PO Take by mouth      Biotin 55692 MCG TABS Take by mouth      timolol (TIMOPTIC) 0.25 % ophthalmic solution 1 drop 2 times daily      bimatoprost (LUMIGAN) 0.01 % SOLN ophthalmic drops Place 1 drop into both eyes nightly      Blood Glucose Monitoring Suppl MARIA ELENA Use to check sugar daily MAY SUBSTITUTE 1 Device 3    Lancets MISC Use to check sugar once daily MAY SUBSTITUTE PER INSURANCE 100 each 3    Insulin Pen Needle 32G X 4 MM MISC 1 each by Does not apply route daily 100 each 3    melatonin (RA MELATONIN) 3 MG TABS tablet Take 10 mg by mouth nightly Indications: Pt is taking 2 tab (5 mg) daily. Total 10 mg daily. 3    Cholecalciferol (VITAMIN D3) 400 UNIT/ML LIQD Take 5,000 Int'l Units by mouth daily. This patient presents to the office today for a preoperative consultation at the request of surgeon, Dr. Karen Manriquez who plans on performing cataract surgery on Oct 6 (right) and Oct 20 (left)  at [facility]. The current problem began > 1 year ago, and symptoms have been worsening with time. Conservative therapy: N/A.     Planned anesthesia: Regional   Known anesthesia problems: None   Bleeding risk: No recent or remote history of abnormal bleeding  Personal or FH of DVT/PE: No      Patient Active Problem List   Diagnosis    Rotator cuff tear    Telangiectasia    Plantar fasciitis    Cervicalgia    Keratosis    Degenerative joint disease    Disc disorder of cervical region    Carpal tunnel syndrome    Peripheral neuropathy    Vitamin D deficiency    Pyriformis syndrome    Essential hypertension    Uncontrolled type 2 diabetes mellitus without complication, without long-term current use of insulin (HCC)    Primary insomnia    Mixed hyperlipidemia    Hypotestosteronemia    Alopecia    Controlled type 2 diabetes mellitus with microalbuminuria, with long-term current use of insulin (HCC)    Basal cell carcinoma of nose       Past Medical History:   Diagnosis Date    Anesthesia     hx of reaction to medications can have \"opposite\" effect with sedatives and causes patient to be jittery, agitated. Patient has concern what will be given.  Arthritis     Diabetic eye exam ([facility] [address] 15    Dr Meagan Nevarez Diabetic eye exam [facility]) 2017    [facility]    Hyperlipidemia     Hypertension     Pneumonia 1996    Type II or unspecified type diabetes mellitus without mention of complication, not stated as uncontrolled      Past Surgical History:   Procedure Laterality Date    CARPAL TUNNEL RELEASE      bilateral    COLONOSCOPY  2013    HYSTERECTOMY      partial    MASTECTOMY, PARTIAL Left 2013    LEFT BREAST NEEDLE LOCALIZED PARTIAL MASTECTOMY EXCISIONAL    OTHER SURGICAL HISTORY      medications for sedation can make her jittery    TUBAL LIGATION       Family History   Problem Relation Age of Onset    High Blood Pressure Mother     High Cholesterol Mother     Depression Mother     High Blood Pressure Brother     High Cholesterol Brother     Diabetes Maternal Grandmother      Social History     Socioeconomic History    Marital status:      Spouse name: Not on file    Number of children: Not on file    Years of education: Not on file    Highest education level: Not on file   Occupational History    Not on file   Social Needs    Financial resource strain: Not on file    Food insecurity     Worry: Not on file     Inability: Not on file   [employer] needs     Medical: Not on file     Non-medical: Not on file   Tobacco Use    Smoking status: Former Smoker     Packs/day: 0.25     Years: 51.00     Pack years: 12.75     Types: Cigarettes     Last attempt to quit: 3/9/2016     Years since quittin.5    Smokeless tobacco: Never Used    Tobacco comment: intermittent smoking over last 51 yrs. Wesley Koo has a cigarette now. Quit routine smoking    Substance and Sexual Activity    Alcohol use: Yes     Alcohol/week: 0.0 standard drinks     Comment: Occas.  x 2-3/ month    Drug use: No    Sexual activity: Not on file   Lifestyle    Physical activity     Days per week: Not on file     Minutes per session: Not on file    Stress: Not on file   Relationships    Social connections     Talks on phone: Not on file     Gets together: Not on file     Attends Tenriism service: Not on file     Active member of club or organization: Not on file     Attends meetings of clubs or organizations: Not on file     Relationship status: Not on file    Intimate partner violence     Fear of current or ex partner: Not on file     Emotionally abused: Not on file     Physically abused: Not on file     Forced sexual activity: Not on file   Other Topics Concern    Not on file   Social History Narrative    Not on file       Review of Systems  A comprehensive review of systems was negative except for what was noted in the HPI. Physical Exam   Constitutional: She is oriented to person, place, and time. She appears well-developed and well-nourished. No distress. HENT:   Head: Normocephalic and atraumatic. Mouth/Throat: Uvula is midline, oropharynx is clear and moist and mucous membranes are normal.   Eyes: Conjunctivae and EOM are normal. Pupils are equal, round, and reactive to light. Neck: Trachea normal and normal range of motion. Neck supple. No JVD present. Carotid bruit is not present. No mass and no thyromegaly present. Cardiovascular: Normal rate, regular rhythm, normal heart sounds and intact distal pulses. Exam reveals no gallop and no friction rub. No murmur heard. Pulmonary/Chest: Effort normal and breath sounds normal. No respiratory distress. She has no wheezes. She has no rales. Abdominal: Soft. Normal aorta and bowel sounds are normal. She exhibits no distension and no mass. There is no hepatosplenomegaly. No tenderness. Musculoskeletal: She exhibits no edema and no tenderness. Neurological: She is alert and oriented to person, place, and time. She has normal strength. No cranial nerve deficit or sensory deficit.  Coordination and gait normal.   Skin: Skin is warm and dry. No rash noted. No erythema. Psychiatric: She has a normal mood and affect. Her behavior is normal.          Assessment:       70 y.o. patient with planned surgery as above. Known risk factors for perioperative complications: Diabetes mellitus  Current medications which may produce withdrawal symptoms if withheld perioperatively: none      Plan:     1. Preoperative workup as follows: none  2. Change in medication regimen before surgery: None  3. Prophylaxis for cardiac events with perioperative beta-blockers: Not indicated  ACC/AHA indications for pre-operative beta-blocker use:    · Vascular surgery with history of postitive stress test  · Intermediate or high risk surgery with history of CAD   · Intermediate or high risk surgery with multiple clinical predictors of CAD- 2 of the following: history of compensated or prior heart failure, history of cerebrovascular disease, DM, or renal insufficiency    Routine administration of higher-dose, long-acting metoprolol in beta-blocker-naïve patients on the day of surgery, and in the absence of dose titration is associated with an overall increase in mortality. Beta-blockers should be started days to weeks prior to surgery and titrated to pulse < 70.  4. Deep vein thrombosis prophylaxis: regimen to be chosen by surgical team  5. No contraindications to planned surgery. 6. CEI form has been faxed.

## 2020-09-29 NOTE — PROGRESS NOTES
Current Influenza vaccine VIS given to patient. Influenza consent form/questionnaire completed and signed. Patient responses to  Influenza consent form / questionnaire  reviewed. Vaccine given per protocol. Vaccine Information Sheet, \"Influenza - Inactivated\"  given to Beatrice Castro, or parent/legal guardian of  Beatrice Castro and verbalized understanding. Patient responses:    Have you ever had a reaction to a flu vaccine? No  Do you have any current illness? No  Have you ever had Guillian [place] Syndrome? No  Do you have a serious allergy to any of the follow: Neomycin, Polymyxin, Thimerosal, eggs or egg products? No    Flu vaccine given per order. Please see immunization tab. Risks and benefits explained. Current VIS given.

## 2020-11-05 RX ORDER — ROSUVASTATIN CALCIUM 40 MG/1
TABLET, COATED ORAL
Qty: 90 TABLET | Refills: 1 | Status: SHIPPED | OUTPATIENT
Start: 2020-11-05 | End: 2021-05-14

## 2020-11-05 RX ORDER — FENOFIBRATE 160 MG/1
TABLET ORAL
Qty: 90 TABLET | Refills: 1 | Status: SHIPPED | OUTPATIENT
Start: 2020-11-05 | End: 2021-05-14

## 2020-11-05 RX ORDER — LISINOPRIL 10 MG/1
TABLET ORAL
Qty: 90 TABLET | Refills: 0 | Status: SHIPPED | OUTPATIENT
Start: 2020-11-05 | End: 2021-01-16

## 2020-11-30 RX ORDER — INSULIN GLARGINE 100 [IU]/ML
INJECTION, SOLUTION SUBCUTANEOUS
Qty: 15 ML | Refills: 3 | Status: SHIPPED | OUTPATIENT
Start: 2020-11-30 | End: 2021-05-17

## 2020-11-30 NOTE — TELEPHONE ENCOUNTER
Medication:   Requested Prescriptions     Pending Prescriptions Disp Refills    LANTUS SOLOSTAR 100 UNIT/ML injection pen [Pharmacy Med Name: Rolando Dominique 100 UNITS/M 100 Solution Pen-injector] 15 mL 3     Sig: INJECT 40 UNITS SUBCUTANEOUSLY DAILY       Last appt: 7/22/2020   Next appt: 1/12/2021    Last Labs DM:   Lab Results   Component Value Date    LABA1C 7.1 07/20/2020

## 2020-12-03 RX ORDER — EMPAGLIFLOZIN 25 MG/1
TABLET, FILM COATED ORAL
Qty: 30 TABLET | Refills: 5 | Status: SHIPPED | OUTPATIENT
Start: 2020-12-03 | End: 2021-05-14

## 2020-12-28 ENCOUNTER — TELEPHONE (OUTPATIENT)
Dept: ENDOCRINOLOGY | Age: 75
End: 2020-12-28

## 2020-12-28 ENCOUNTER — TELEPHONE (OUTPATIENT)
Dept: FAMILY MEDICINE CLINIC | Age: 75
End: 2020-12-28

## 2021-01-08 DIAGNOSIS — Z79.4 CONTROLLED TYPE 2 DIABETES MELLITUS WITHOUT COMPLICATION, WITH LONG-TERM CURRENT USE OF INSULIN (HCC): ICD-10-CM

## 2021-01-08 DIAGNOSIS — E11.9 CONTROLLED TYPE 2 DIABETES MELLITUS WITHOUT COMPLICATION, WITH LONG-TERM CURRENT USE OF INSULIN (HCC): ICD-10-CM

## 2021-01-09 LAB
CREATININE URINE: 83.2 MG/DL (ref 28–259)
ESTIMATED AVERAGE GLUCOSE: 165.7 MG/DL
HBA1C MFR BLD: 7.4 %
MICROALBUMIN UR-MCNC: <1.2 MG/DL
MICROALBUMIN/CREAT UR-RTO: NORMAL MG/G (ref 0–30)

## 2021-01-12 ENCOUNTER — VIRTUAL VISIT (OUTPATIENT)
Dept: ENDOCRINOLOGY | Age: 76
End: 2021-01-12
Payer: MEDICARE

## 2021-01-12 DIAGNOSIS — E11.65 UNCONTROLLED TYPE 2 DIABETES MELLITUS WITH HYPERGLYCEMIA (HCC): Primary | ICD-10-CM

## 2021-01-12 PROCEDURE — 99442 PR PHYS/QHP TELEPHONE EVALUATION 11-20 MIN: CPT | Performed by: INTERNAL MEDICINE

## 2021-01-12 NOTE — PROGRESS NOTES
Seen as f/u        Pursuant to the emergency declaration under the [address] [facility] waiver authority and the [employer] Act, this Virtual  Visit was conducted, with patient's consent, to reduce the patient's risk of exposure to COVID-19 and provide continuity of care for an established patient. Patient was at home. Provider was at home. No one else was involved  Services were provided through a video synchronous discussion virtually to substitute for in-person clinic visit.     Interim:  Reports had some high CHO food choices  Cataract surgery    Needs switch back to lantus  Occ diarrhea with metformin- irregular    Diagnosed with Type 2 diabetes mellitus in 2006  Known diabetic complications: Neuropathy    Current diabetic medications   Metformin 500mg ER BID can not tolerate higher dose  Jardiance  25mg  Victoza  1.8  lantus 41    h/o use of sulfonylurea/ TZD      Last A1c 7.4 %<-----7.1% <------  6.3%<------ 7.5%<------ 8.5%<------7.6%<------- 9%<------9.8 on 1/18 <--- 10.2 <--- 9.1    Prior visit with dietician: Yes   Current diet: on average, 3 meals per day   Current exercise: walking   Current monitoring regimen: home blood tests -1  times daily     Has brought blood glucose log/meter: yes  Home blood sugar records:  Any episodes of hypoglycemia? -    No Hx of CAD , PVD, CVA    Hyperlipidemia: controlled, taking statin  LDL 36 on 1/18   crestor 40mg fenofibrate 160mg  LDL 42 on 4/19  LDL 57 on 7/20  Last eye exam: 1/20  Last foot exam: 1/21 by poiatrist  Last microalbumin to creatinine ratio: 1/21    HTN: Controlled, on  lisinopril 5mg    States hair loss for last 2 years    1/18 Testosterone < 3  DHEA-S 20  TSH 1.04    Calcium high in the past but normal since 2011 1/18 Ca 9.8  7/11  Ca 11.0  6/09 Ca 10.9        Past Medical History:   Diagnosis Date    Anesthesia hx of reaction to medications can have \"opposite\" effect with sedatives and causes patient to be jittery, agitated. Patient has concern what will be given.      Arthritis     Diabetic eye exam ([facility] [address] 08.24.15    Dr Barron Fine Diabetic [facility] exam [facility]) 01/11/2017    Diabetic eye exam ([facility] [address] 01/09/2019    Hyperlipidemia     Hypertension     Pneumonia 1996    Type II or unspecified type diabetes mellitus without mention of complication, not stated as uncontrolled      Past Surgical History:   Procedure Laterality Date    CARPAL TUNNEL RELEASE      bilateral    COLONOSCOPY  5/2013    HYSTERECTOMY      partial    MASTECTOMY, PARTIAL Left 07/18/2013    LEFT BREAST NEEDLE LOCALIZED PARTIAL MASTECTOMY EXCISIONAL    OTHER SURGICAL HISTORY      medications for sedation can make her jittery    TUBAL LIGATION       Current Outpatient Medications   Medication Sig Dispense Refill    JARDIANCE 25 MG tablet TAKE ONE TABLET BY MOUTH DAILY 30 tablet 5    LANTUS SOLOSTAR 100 UNIT/ML injection pen INJECT 40 UNITS SUBCUTANEOUSLY DAILY 15 mL 3    rosuvastatin (CRESTOR) 40 MG tablet TAKE ONE TABLET BY MOUTH EACH EVENING 90 tablet 1    lisinopril (PRINIVIL;ZESTRIL) 10 MG tablet TAKE ONE TABLET DAILY 90 tablet 0    fenofibrate (TRIGLIDE) 160 MG tablet TAKE ONE TABLET BY MOUTH DAILY 90 tablet 1    Blood Glucose Monitoring Suppl (TRUE METRIX METER) w/Device KIT 1 each by Does not apply route daily 1 kit 0    VICTOZA 18 MG/3ML SOPN SC injection INJECT 1.8MG SUBCUTANEOUSLY DAILY 9 mL 2    SURE COMFORT PEN NEEDLES 31G X 5 MM MISC USE WITH LANTUS SOLOSTAR PENS 100 each 0    metFORMIN (GLUCOPHAGE-XR) 500 MG extended release tablet TAKE ONE TABLET BY MOUTH TWICE DAILY (TAKE WITH FOOD) 180 tablet 1    blood glucose test strips (TRUE METRIX BLOOD GLUCOSE TEST) strip USE TO CHECK SUGAR ONCE DAILY MAY SUBSTITUTE PER INSURANCE 100 strip 3    BL GLUCOSAMINE-CHONDROITIN PO Take by mouth      Biotin 66319 MCG TABS Take by mouth  timolol (TIMOPTIC) 0.25 % ophthalmic solution 1 drop 2 times daily      bimatoprost (LUMIGAN) 0.01 % SOLN ophthalmic drops Place 1 drop into both eyes nightly      Blood Glucose Monitoring Suppl MARIA ELENA Use to check sugar daily MAY SUBSTITUTE 1 Device 3    Lancets MISC Use to check sugar once daily MAY SUBSTITUTE PER INSURANCE 100 each 3    Insulin Pen Needle 32G X 4 MM MISC 1 each by Does not apply route daily 100 each 3    melatonin (RA MELATONIN) 3 MG TABS tablet Take 10 mg by mouth nightly Indications: Pt is taking 2 tab (5 mg) daily. Total 10 mg daily. 3    Cholecalciferol (VITAMIN D3) 400 UNIT/ML LIQD Take 5,000 Int'l Units by mouth daily. No current facility-administered medications for this visit. Review of Systems  Please see scanned document dated and signed      Objective:      LMP  (LMP Unknown)   Wt Readings from Last 3 Encounters:   09/29/20 137 lb 3.2 oz (62.2 kg)   07/22/20 135 lb 6.4 oz (61.4 kg)   01/13/20 134 lb 12.8 oz (61.1 kg)     Constitutional: Well-developed, appears stated age, cooperative, in no acute distress  H/E/N/M/T:atraumatic, normocephalic, external ears, nose, lips normal without lesions  Eyes: Lids, lashes, conjunctivae and sclerae normal, No proptosis, no redness  Neck: supple, symmetrical, no swelling  Skin: No obvious rashes or lesions present.   Skin and hair texture normal  Psychiatric: Judgement and Insight:  judgement and insight appear normal  Neuro: Normal without focal findings, speech is normal normal, speech is spontaneous  Chest: No labored breathing, no chest deformity, no stridor  Musculoskeletal: No joint deformity, swelling      4/19  Skeletal foot exam is normal, no skin lesions, toenails are normal, pulses are normal, 10 g monofilament is reduced big toes    Lab Reviewed   No components found for: CHLPL  Lab Results   Component Value Date    TRIG 96 07/20/2020    TRIG 112 04/05/2019    TRIG 134 10/24/2018     Lab Results Component Value Date    HDL 41 07/20/2020    HDL 37 (L) 04/05/2019    HDL 41 10/24/2018     Lab Results   Component Value Date    LDLCALC 57 07/20/2020    LDLCALC 42 04/05/2019    LDLCALC 59 10/24/2018     Lab Results   Component Value Date    LABVLDL 19 07/20/2020    LABVLDL 22 04/05/2019    LABVLDL 27 10/24/2018     Lab Results   Component Value Date    LABA1C 7.4 01/08/2021       Assessment:     Lindsay Glass is a 70 y.o. female with :    1.T2DM: Longstanding, uncontrolled, provided education. Discussed with patient given hyperglycemia, A1c, duration of DM, recommended basal insulin. She was hesitant but agreed after discussion. A1c slightly increased. Needs to check post prandial glucose. Will need meal time insulin, she would like to make diet changes  Advised 30 gm CHO with meal  Decrease metformin given GI issues  2. HTN : BP at goal  3. HLD: LDL at goal  4. Hair loss : DHEA-S, TSH nl. Testosterone low, discussed it is unlikely cause of her hair loss. Advised to see dermatology to rule out any other causes. 5.Hypercalcemia: Improved    Plan:        lantus 41 units daily, advised self titration    continue victoza   Continue metformin, change to once a day    May need prandial insulin, she would like to avoid   Advise to check blood sugar 1 -2 times a day   Patient to send blood sugar log for titration. Advise to exercise regularly. Advise to low simple carbohydrate and protein with each  meal diet. Diabetes Care: recommend yearly eye exam, foot exam and urine microalbumin to   creatinine ratio. Patient is up-to-date. Total 11 minutes spent during the encounter for discussing medical care.    F/u in 6 months as will see PCP

## 2021-01-15 RX ORDER — LIRAGLUTIDE 6 MG/ML
INJECTION SUBCUTANEOUS
Qty: 9 ML | Refills: 0 | Status: CANCELLED | OUTPATIENT
Start: 2021-01-15

## 2021-01-15 NOTE — TELEPHONE ENCOUNTER
Pt would like to have One Touch Verio Strips called into [facility] #[id number]- [place], [place] - [address].  Law Salguero [phone] - F [phone]

## 2021-01-16 RX ORDER — LISINOPRIL 10 MG/1
TABLET ORAL
Qty: 90 TABLET | Refills: 0 | Status: SHIPPED | OUTPATIENT
Start: 2021-01-16 | End: 2021-05-14

## 2021-01-16 RX ORDER — METFORMIN HYDROCHLORIDE 500 MG/1
TABLET, EXTENDED RELEASE ORAL
Qty: 180 TABLET | Refills: 1 | Status: SHIPPED | OUTPATIENT
Start: 2021-01-16 | End: 2021-08-12

## 2021-01-16 RX ORDER — LIRAGLUTIDE 6 MG/ML
INJECTION SUBCUTANEOUS
Qty: 9 ML | Refills: 2 | Status: SHIPPED | OUTPATIENT
Start: 2021-01-16 | End: 2021-04-12

## 2021-01-18 RX ORDER — BLOOD SUGAR DIAGNOSTIC
1 STRIP MISCELLANEOUS DAILY
Qty: 100 EACH | Refills: 3 | Status: SHIPPED | OUTPATIENT
Start: 2021-01-18 | End: 2022-04-25

## 2021-03-09 ENCOUNTER — IMMUNIZATION (OUTPATIENT)
Dept: FAMILY MEDICINE CLINIC | Age: 76
End: 2021-03-09
Payer: MEDICARE

## 2021-03-09 PROCEDURE — 91300 COVID-19, PFIZER VACCINE 30MCG/0.3ML DOSE: CPT | Performed by: FAMILY MEDICINE

## 2021-03-09 PROCEDURE — 0001A COVID-19, PFIZER VACCINE 30MCG/0.3ML DOSE: CPT | Performed by: FAMILY MEDICINE

## 2021-03-19 ENCOUNTER — OFFICE VISIT (OUTPATIENT)
Dept: DERMATOLOGY | Age: 76
End: 2021-03-19
Payer: MEDICARE

## 2021-03-19 VITALS — TEMPERATURE: 97.9 F

## 2021-03-19 DIAGNOSIS — D22.9 MULTIPLE BENIGN NEVI: ICD-10-CM

## 2021-03-19 DIAGNOSIS — L82.1 SEBORRHEIC KERATOSES: Primary | ICD-10-CM

## 2021-03-19 DIAGNOSIS — L57.0 ACTINIC KERATOSIS: ICD-10-CM

## 2021-03-19 DIAGNOSIS — Z85.828 HISTORY OF BASAL CELL CARCINOMA: ICD-10-CM

## 2021-03-19 PROCEDURE — 4040F PNEUMOC VAC/ADMIN/RCVD: CPT | Performed by: DERMATOLOGY

## 2021-03-19 PROCEDURE — 3017F COLORECTAL CA SCREEN DOC REV: CPT | Performed by: DERMATOLOGY

## 2021-03-19 PROCEDURE — G8484 FLU IMMUNIZE NO ADMIN: HCPCS | Performed by: DERMATOLOGY

## 2021-03-19 PROCEDURE — 1036F TOBACCO NON-USER: CPT | Performed by: DERMATOLOGY

## 2021-03-19 PROCEDURE — G8427 DOCREV CUR MEDS BY ELIG CLIN: HCPCS | Performed by: DERMATOLOGY

## 2021-03-19 PROCEDURE — G8420 CALC BMI NORM PARAMETERS: HCPCS | Performed by: DERMATOLOGY

## 2021-03-19 PROCEDURE — 1123F ACP DISCUSS/DSCN MKR DOCD: CPT | Performed by: DERMATOLOGY

## 2021-03-19 PROCEDURE — 1090F PRES/ABSN URINE INCON ASSESS: CPT | Performed by: DERMATOLOGY

## 2021-03-19 PROCEDURE — G8400 PT W/DXA NO RESULTS DOC: HCPCS | Performed by: DERMATOLOGY

## 2021-03-19 PROCEDURE — 99213 OFFICE O/P EST LOW 20 MIN: CPT | Performed by: DERMATOLOGY

## 2021-03-19 PROCEDURE — 17000 DESTRUCT PREMALG LESION: CPT | Performed by: DERMATOLOGY

## 2021-03-19 NOTE — PROGRESS NOTES
[facility]) Dermatology  Izzy Baron DO, [address]       Sarmad Anton  1945    70 y.o. female     Date of Visit: 3/19/2021    Chief Complaint:   Chief Complaint   Patient presents with    Skin Lesion     TBSE        I was asked to see this patient by Dr. Zuñiga ref. provider found. History of Present Illness:  Sarmad Anton is a 70 y.o. female who presents with the chief complaint of the followin. Total-body skin exam for spots. Many year history of multiple nevi on the head/neck, trunk and extremities, all present for many years. Denies new moles. Denies moles changing in size, shape, color. None associated w/ pain, bleeding, pruritus. 2.History of BCC, denies recurrence. 3.  History of actinic keratosis to right lateral forehead status post cryotherapy at last visit in 2019. Patient denies recurrence. 4. Complains of 2 raised rough feeling growths to her left inferior abdomen, asymptomatic. Admits to sun exposure in youth without wearing sunscreen, hats, or protective clothing. Wears sunscreen when outdoors for long periods of time. Review of Systems:  Constitutional: Reports general sense of well-being   Skin: No new or changing moles, no tendency to develop thick scars, no interval of severe sunburns  Heme: No abnormal bruising or bleeding. Past Skin Hx:  -2019-history of AK on biopsy located to left lateral dorsal hand status post cryotherapy in 2019  -2018-hx of BCC located to left nasal alar crease s/p Mohs surgery  -History of androgenetic alopecia-plan treatment with Rogaine 5% foam, prefers to use a shampoo with biotin in it.  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 Patient declined biopsy at last visit 6 months ago. Has noticed loss of eyebrows slowly over the last year. Denies burning, pruritis, irritation, pain, tenderness to scalp. Pt states that she does not want any further workup and has accepted herself losing hair. She may consider a wig in the future. Patient denies past history of melanoma, dysplastic nevi    PFHx: Denies hx of MM or NMSC    ADDITIONAL HISTORY:    I have reviewed past medical and surgical histories, current medications, allergies, social and family histories as documented in the patient's electronic medical record. Family History   Problem Relation Age of Onset    High Blood Pressure Mother     High Cholesterol Mother     Depression Mother     High Blood Pressure Brother     High Cholesterol Brother     Diabetes Maternal Grandmother      Past Medical History:   Diagnosis Date    Anesthesia     hx of reaction to medications can have \"opposite\" effect with sedatives and causes patient to be jittery, agitated. Patient has concern what will be given.  Arthritis     Diabetic eye exam ([facility] [address] 08.24.15    Dr Sushil Vargas Diabetic eye exam [facility]) 01/11/2017    Diabetic eye exam ([facility] [address] 01/09/2019    Hyperlipidemia     Hypertension     Pneumonia 1996    Type II or unspecified type diabetes mellitus without mention of complication, not stated as uncontrolled      Past Surgical History:   Procedure Laterality Date    CARPAL TUNNEL RELEASE      bilateral    COLONOSCOPY  5/2013    HYSTERECTOMY      partial    MASTECTOMY, PARTIAL Left 07/18/2013    LEFT BREAST NEEDLE LOCALIZED PARTIAL MASTECTOMY EXCISIONAL    OTHER SURGICAL HISTORY      medications for sedation can make her jittery    TUBAL LIGATION         Allergies   Allergen Reactions    Benadryl [Diphenhydramine]     Erythromycin Nausea And Vomiting     Outpatient Medications Marked as Taking for the 3/19/21 encounter (Office Visit) with Lawson Sherman, DO   Medication Sig Dispense Refill    blood glucose test strips (ONETOUCH VERIO) strip 1 each by In Vitro route daily As needed. 100 each 3    lisinopril (PRINIVIL;ZESTRIL) 10 MG tablet TAKE ONE TABLET DAILY 90 tablet 0    metFORMIN (GLUCOPHAGE-XR) 500 MG extended release tablet TAKE ONE TABLET BY MOUTH TWICE DAILY (TAKE WITH FOOD) 180 tablet 1    VICTOZA 18 MG/3ML SOPN SC injection INJECT 1.8MG SUBCUTANEOUSLY DAILY 9 mL 2    JARDIANCE 25 MG tablet TAKE ONE TABLET BY MOUTH DAILY 30 tablet 5    LANTUS SOLOSTAR 100 UNIT/ML injection pen INJECT 40 UNITS SUBCUTANEOUSLY DAILY 15 mL 3    rosuvastatin (CRESTOR) 40 MG tablet TAKE ONE TABLET BY MOUTH EACH EVENING 90 tablet 1    fenofibrate (TRIGLIDE) 160 MG tablet TAKE ONE TABLET BY MOUTH DAILY 90 tablet 1    Blood Glucose Monitoring Suppl (TRUE METRIX METER) w/Device KIT 1 each by Does not apply route daily 1 kit 0    SURE COMFORT PEN NEEDLES 31G X 5 MM MISC USE WITH LANTUS SOLOSTAR PENS 100 each 0    blood glucose test strips (TRUE METRIX BLOOD GLUCOSE TEST) strip USE TO CHECK SUGAR ONCE DAILY MAY SUBSTITUTE PER INSURANCE 100 strip 3    BL GLUCOSAMINE-CHONDROITIN PO Take by mouth      Biotin 48183 MCG TABS Take by mouth      timolol (TIMOPTIC) 0.25 % ophthalmic solution 1 drop 2 times daily      bimatoprost (LUMIGAN) 0.01 % SOLN ophthalmic drops Place 1 drop into both eyes nightly      Blood Glucose Monitoring Suppl MARIA ELENA Use to check sugar daily MAY SUBSTITUTE 1 Device 3    Lancets MISC Use to check sugar once daily MAY SUBSTITUTE PER INSURANCE 100 each 3    Insulin Pen Needle 32G X 4 MM MISC 1 each by Does not apply route daily 100 each 3    melatonin (RA MELATONIN) 3 MG TABS tablet Take 10 mg by mouth nightly Indications: Pt is taking 2 tab (5 mg) daily. Total 10 mg daily. 3    Cholecalciferol (VITAMIN D3) 400 UNIT/ML LIQD Take 5,000 Int'l Units by mouth daily.          Social History:   Social History     Socioeconomic History    Marital status:      Spouse name: Not on file    Number of children: Not on file    Years of education: Not on file    Highest education level: Not on file   Occupational History    Not on file   Social Needs    Financial resource strain: Not on file    Food insecurity     Worry: Not on file     Inability: Not on file    Transportation needs     Medical: Not on file     Non-medical: Not on file   Tobacco Use    Smoking status: Former Smoker     Packs/day: 0.25     Years: 51.00     Pack years: 12.75     Types: Cigarettes     Quit date: 3/9/2016     Years since quittin.0    Smokeless tobacco: Never Used    Tobacco comment: intermittent smoking over last 46 yrs. [place] Current has a cigarette now. Quit routine smoking    Substance and Sexual Activity    Alcohol use: Yes     Alcohol/week: 0.0 standard drinks     Comment: Occas. x 2-3/ month    Drug use: No    Sexual activity: Not on file   Lifestyle    Physical activity     Days per week: Not on file     Minutes per session: Not on file    Stress: Not on file   Relationships    Social connections     Talks on phone: Not on file     Gets together: Not on file     Attends Hoahaoism service: Not on file     Active member of club or organization: Not on file     Attends meetings of clubs or organizations: Not on file     Relationship status: Not on file    Intimate partner violence     Fear of current or ex partner: Not on file     Emotionally abused: Not on file     Physically abused: Not on file     Forced sexual activity: Not on file   Other Topics Concern    Not on file   Social History Narrative    Not on file       Physical Examination     The following were examined and determined to be normal: Psych/Neuro, Scalp/hair, Head/face, Conjunctivae/eyelids, Gums/teeth/lips, Neck, Breast/axilla/chest, Back, RUE, LUE, RLE, LLE and Nails/digits. buttocks. Areas covered by underwear garment(s) not examined. The following were examined and determined to be abnormal: Abdomen.      [place] phototype: 2    -Constitutional: Well appearing, no acute distress  -Neurological: Alert and oriented X 3  -Mood and Affect: Pleasant  Total body skin exam performed, areas examined listed above:   1. 2 stuck-on appearing tan-brown verrucous papules located to left inferior abdomen   2. ill defined irreg shaped gritty keratotic pink macule(s) located to left superior chest  3. Scattered on the head,neck, trunk and extremities are multiple well-defined round and oval symmetric smoothly-bordered uniformly brown macules and papules; no bleeding nevi. 4.  Left nasal alar crease-well-healed scar, clear  Assessment and Plan     1. Seborrheic keratoses    2. Actinic keratosis    3. Multiple benign nevi    4. History of basal cell carcinoma        1. Seborrheic keratoses  -Reassurance provided to the patient regarding their chronic and benign nature. No treatment performed      2. Actinic keratosis  -Edu re: relationship with chronic cumulative sun exposure, low premalignant potential.   Verbal consent obtained.   -L superior chest, 1 lesion(s) treated w/ liquid nitrogen x 1cycles, 3 seconds each located    Edu re: risk of blister formation, discomfort, scar, dyspigmentation. Discussed wound care. -Reviewed sun protective behavior -- sun avoidance during the peak hours of the day, sun-protective clothing (including hat and sunglasses), sunscreen use (water resistant, broad spectrum, SPF at least 30, need for reapplication every 2 to 3 hours). -Patient to contact office if AK fails to resolve despite treatment or concern for recurrence (discussed signs/symptoms to monitor for) or if patient develops side effect from therapy, such as unbearable blister, crusting, scabbing, redness, or tenderness.       3. Multiple benign nevi  Benign acquired melanocytic nevi  -Recommend monthly self skin exams   -Educated regarding the ABCDEs of melanoma detection   -Call for any new/changing moles or concerning lesions  -Reviewed sun protective behavior -- sun avoidance during the peak hours of the day, sun-protective clothing (including

## 2021-03-19 NOTE — PATIENT INSTRUCTIONS
dry it out with rubbing alcohol or hydrogen peroxide.  Continue this regimen until the area is pink and healed. Depending on the size and location of your cryosurgery site, healing may take 2 to 4 weeks.  The area may continue to be pink for several weeks, and over the next few months may become darker or lighter than the surrounding skin. This may be a permanent change. Thanks for your visit! Feel free to send  Dr. Berna Arriaga assistant, Olga, a ContactMonkey message/call for any questions, concerns or  to schedule your cosmetic procedures.

## 2021-03-30 ENCOUNTER — IMMUNIZATION (OUTPATIENT)
Dept: FAMILY MEDICINE CLINIC | Age: 76
End: 2021-03-30
Payer: MEDICARE

## 2021-03-30 PROCEDURE — 0002A COVID-19, PFIZER VACCINE 30MCG/0.3ML DOSE: CPT | Performed by: FAMILY MEDICINE

## 2021-03-30 PROCEDURE — 91300 COVID-19, PFIZER VACCINE 30MCG/0.3ML DOSE: CPT | Performed by: FAMILY MEDICINE

## 2021-04-01 ENCOUNTER — OFFICE VISIT (OUTPATIENT)
Dept: FAMILY MEDICINE CLINIC | Age: 76
End: 2021-04-01
Payer: MEDICARE

## 2021-04-01 VITALS
RESPIRATION RATE: 16 BRPM | DIASTOLIC BLOOD PRESSURE: 60 MMHG | SYSTOLIC BLOOD PRESSURE: 117 MMHG | BODY MASS INDEX: 23.02 KG/M2 | OXYGEN SATURATION: 97 % | HEART RATE: 71 BPM | WEIGHT: 136.2 LBS | TEMPERATURE: 96.4 F

## 2021-04-01 DIAGNOSIS — E55.9 VITAMIN D DEFICIENCY: ICD-10-CM

## 2021-04-01 DIAGNOSIS — E11.9 INSULIN DEPENDENT TYPE 2 DIABETES MELLITUS (HCC): ICD-10-CM

## 2021-04-01 DIAGNOSIS — I10 ESSENTIAL HYPERTENSION: ICD-10-CM

## 2021-04-01 DIAGNOSIS — R45.82 FEELING WORRIED: ICD-10-CM

## 2021-04-01 DIAGNOSIS — Z79.4 INSULIN DEPENDENT TYPE 2 DIABETES MELLITUS (HCC): ICD-10-CM

## 2021-04-01 DIAGNOSIS — M53.3 SACRAL PAIN: ICD-10-CM

## 2021-04-01 DIAGNOSIS — G57.02 PIRIFORMIS SYNDROME, LEFT: Primary | ICD-10-CM

## 2021-04-01 PROCEDURE — G8400 PT W/DXA NO RESULTS DOC: HCPCS | Performed by: FAMILY MEDICINE

## 2021-04-01 PROCEDURE — 1036F TOBACCO NON-USER: CPT | Performed by: FAMILY MEDICINE

## 2021-04-01 PROCEDURE — 1090F PRES/ABSN URINE INCON ASSESS: CPT | Performed by: FAMILY MEDICINE

## 2021-04-01 PROCEDURE — G8427 DOCREV CUR MEDS BY ELIG CLIN: HCPCS | Performed by: FAMILY MEDICINE

## 2021-04-01 PROCEDURE — 2022F DILAT RTA XM EVC RTNOPTHY: CPT | Performed by: FAMILY MEDICINE

## 2021-04-01 PROCEDURE — 3051F HG A1C>EQUAL 7.0%<8.0%: CPT | Performed by: FAMILY MEDICINE

## 2021-04-01 PROCEDURE — G8420 CALC BMI NORM PARAMETERS: HCPCS | Performed by: FAMILY MEDICINE

## 2021-04-01 PROCEDURE — 99214 OFFICE O/P EST MOD 30 MIN: CPT | Performed by: FAMILY MEDICINE

## 2021-04-01 PROCEDURE — 1123F ACP DISCUSS/DSCN MKR DOCD: CPT | Performed by: FAMILY MEDICINE

## 2021-04-01 PROCEDURE — 3017F COLORECTAL CA SCREEN DOC REV: CPT | Performed by: FAMILY MEDICINE

## 2021-04-01 PROCEDURE — 4040F PNEUMOC VAC/ADMIN/RCVD: CPT | Performed by: FAMILY MEDICINE

## 2021-04-01 SDOH — ECONOMIC STABILITY: TRANSPORTATION INSECURITY
IN THE PAST 12 MONTHS, HAS THE LACK OF TRANSPORTATION KEPT YOU FROM MEDICAL APPOINTMENTS OR FROM GETTING MEDICATIONS?: NO

## 2021-04-01 SDOH — ECONOMIC STABILITY: INCOME INSECURITY: HOW HARD IS IT FOR YOU TO PAY FOR THE VERY BASICS LIKE FOOD, HOUSING, MEDICAL CARE, AND HEATING?: NOT HARD AT ALL

## 2021-04-01 SDOH — ECONOMIC STABILITY: FOOD INSECURITY: WITHIN THE PAST 12 MONTHS, YOU WORRIED THAT YOUR FOOD WOULD RUN OUT BEFORE YOU GOT MONEY TO BUY MORE.: NEVER TRUE

## 2021-04-01 ASSESSMENT — PATIENT HEALTH QUESTIONNAIRE - PHQ9
SUM OF ALL RESPONSES TO PHQ9 QUESTIONS 1 & 2: 0
1. LITTLE INTEREST OR PLEASURE IN DOING THINGS: 0
2. FEELING DOWN, DEPRESSED OR HOPELESS: 0

## 2021-04-01 NOTE — PATIENT INSTRUCTIONS
[address] TOOLS YOU HAVE LEARNED THROUGH DR. Archie Chung. CONTINUE TO WORK WITH YOUR TRUSTED FAMILY AND FINANCIAL/INVESTMENT FOR [facility] [employer]. CONTINUE SAFETY PROTOCOLS. YOU COMPLETED YOUR COVID VACCINATIONS. YOU TYPICALLY WILL ACHIEVE FULL IMMUNITY BY MID-April. --You can get your lab work or x-ray done at [facility]. LAB: [address]  Lab hours (7AM - 5PM Monday through Friday; 8AM - noon on Saturdays),   Ph# [phone]    X-RAY: [address]  Radiology hours, (7:00AM - 5PM Monday through Friday only)  Ph# [phone] or [phone]  --Call our office in 1 week if you have not heard about the results. Or check [employer] if you have previously enrolled. UPDATE YOUR PCP IF LEFT SIDED SACRAL/BUTTOCK PAIN IF NO SIGNIFICANTLY BETTER TO CONSIDER IMAGING VS TREATMENT          Patient Education        Piriformis Syndrome: Exercises  Introduction  Here are some examples of exercises for you to try. The exercises may be suggested for a condition or for rehabilitation. Start each exercise slowly. Ease off the exercises if you start to have pain. You will be told when to start these exercises and which ones will work best for you. How to do the exercises  Hip rotator stretch   1. Lie on your back with both knees bent and your feet flat on the floor. 2. Put the ankle of your affected leg on your opposite thigh near your knee. 3. Use your hand to gently push your knee (on your affected leg) away from your body until you feel a gentle stretch around your hip. 4. Hold the stretch for 15 to 30 seconds. 5. Repeat 2 to 4 times. 6. Switch legs and repeat steps 1 through 5. Piriformis stretch   1. Lie on your back with your legs straight. 2. Lift your affected leg and bend your knee. With your opposite hand, reach across your body, and then gently pull your knee toward your opposite shoulder. 3. Hold the stretch for 15 to 30 seconds.   4. Repeat with your other leg.  5. Repeat 2 to 4 times on each side. Lower abdominal strengthening   1. Lie on your back with your knees bent and your feet flat on the floor. 2. Tighten your belly muscles by pulling your belly button in toward your spine. 3. Lift one foot off the floor and bring your knee toward your chest, so that your knee is straight above your hip and your leg is bent like the letter \"L. \"  4. Lift the other knee up to the same position. 5. Lower one leg at a time to the starting position. 6. Keep alternating legs until you have lifted each leg 8 to 12 times. 7. Be sure to keep your belly muscles tight and your back still as you are moving your legs. Be sure to breathe normally. Follow-up care is a key part of your treatment and safety. Be sure to make and go to all appointments, and call your doctor if you are having problems. It's also a good idea to know your test results and keep a list of the medicines you take. Current as of: November 16, 2020               Content Version: 12.8  © 2006-2021 [employer]. Care instructions adapted under license by [facility] ([facility]). If you have questions about a medical condition or this instruction, always ask your healthcare professional. [place] [address] any warranty or liability for your use of this information. Patient Education        Piriformis Syndrome: Care Instructions  Your Care Instructions     The piriformis muscle is deep under your rear end (buttock). One end of the muscle connects deep inside the pelvic area, and the other end attaches to the top of the thighbone. This muscle can press on the sciatic nerve that runs from your spine down your leg. When this happens, you may have pain, numbness, and tingling in the buttock and down the back of your leg. This is called piriformis syndrome. The pain may get worse when you sit for a long time or climb stairs.  Also, you may be more likely to develop piriformis syndrome if you run or walk often. Your doctor will check for other causes of your pain before treating this syndrome. Treatment may include stretching exercises, massage, and medicine for the pain and swelling. If these do not help, you may get a shot of steroid medicine. Until the pain is gone, you may need to rest the muscle and limit activities like running. Exercises and a change in how you move and sit may be enough to stop the pressure on the nerve. Follow-up care is a key part of your treatment and safety. Be sure to make and go to all appointments, and call your doctor if you are having problems. It's also a good idea to know your test results and keep a list of the medicines you take. How can you care for yourself at home? · If your doctor thinks that strenuous exercise is causing your problem, stop or cut back on activities such as running. You may find swimming to be a good exercise for a while. · Stretch the piriformis muscle. ? Lie on your back. ? Bend one leg at the knee and keep the other leg flat on the ground. ? Raise your bent knee up and then move it across your body. Hold the outside of the knee with the opposite hand. ? Gently pull the knee with your hand toward the opposite shoulder. ? Hold the stretch for at least 15 to 30 seconds. Switch legs. ? Do the stretch several times each day. · Massage the muscle to relieve pressure. ? Sit on the floor. Lean to one side so that the hip on your sore side is off the ground. Put a tennis ball under your buttock on that side. ? As you put weight onto the tennis ball, you may find spots that are especially sore. Move gently so that the tennis ball gently massages each of the sore spots. · Use ice or heat to help reduce pain. Put ice or a cold pack or a heating pad set on low or a warm cloth on the sore area for 10 to 20 minutes at a time. Put a thin cloth between the ice pack or heating pad and your skin.   · Ask your doctor if you can take an over-the-counter pain medicine, such as acetaminophen (Tylenol), ibuprofen (Advil, Motrin), or naproxen (Aleve). Be safe with medicines. Read and follow all instructions on the label. · Have your doctor or a physical therapist watch how you move. You may need physical therapy or special inserts in your shoes (orthotics) to help you move in a way that does not put pressure on your nerves. When should you call for help? Watch closely for changes in your health, and be sure to contact your doctor if:    · You do not feel better after several weeks of home care.     · Your pain gets worse.     · Your leg becomes weak or numb. Where can you learn more? Go to https://ArticleAlleyeb."[employer]. org and sign in to your [employer] account. Enter K352 in the Yeel[employer] box to learn more about \"Piriformis Syndrome: Care Instructions. \"     If you do not have an account, please click on the \"Sign Up Now\" link. Current as of: November 16, 2020               Content Version: 12.8  © 2006-2021 [employer]. Care instructions adapted under license by [facility] ([facility]). If you have questions about a medical condition or this instruction, always ask your healthcare professional. [address] any warranty or liability for your use of this information.

## 2021-04-01 NOTE — PROGRESS NOTES
[facility]  Progress Note  Page DO Misael Nation  [birth year]    04/02/21    Chief Complaint:   Misael Dockery is a 70 y.o. female who is here for mood and left-sided buttock pain        HPI:   Is admit to few moments of feeling down acknowledging her 's declining memory. She has worked with a talk therapist in the past which she found beneficial.  Has been experiencing some dry cough for about a week or so. She believes it is due to allergies. For the last 3 months or so has been experiencing intermittent left-sided buttock pain. Seems to radiate to the left posterior thigh. At times needs to sit on the seat cushion. Denies any alarm features of loss of bladder or bowel incontinence. ROS negative for headache, visionchanges, chest pain, shortness of breath, abdominal pain, urinary sx, bowel changes. Past medical, surgical, and social history reviewed. and allergies reviewed. Allergies   Allergen Reactions    Benadryl [Diphenhydramine]     Erythromycin Nausea And Vomiting     Prior to Visit Medications    Medication Sig Taking? Authorizing Provider   blood glucose test strips (ONETOUCH VERIO) strip 1 each by In Vitro route daily As needed.  Yes Lelia Hernández MD   lisinopril (PRINIVIL;ZESTRIL) 10 MG tablet TAKE ONE TABLET DAILY Yes Rebecca Soler DO   metFORMIN (GLUCOPHAGE-XR) 500 MG extended release tablet TAKE ONE TABLET BY MOUTH TWICE DAILY (TAKE WITH FOOD) Yes Rebecca Soler DO   VICTOZA 18 MG/3ML SOPN SC injection INJECT 1.8MG SUBCUTANEOUSLY DAILY Yes Rebecca Soler DO   JARDIANCE 25 MG tablet TAKE ONE TABLET BY MOUTH DAILY Yes Rebecca Soler DO   LANTUS SOLOSTAR 100 UNIT/ML injection pen INJECT 40 UNITS SUBCUTANEOUSLY DAILY  Patient taking differently: INJECT 41 UNITS SUBCUTANEOUSLY DAILY Yes Lelia Hernández MD   rosuvastatin (CRESTOR) 40 MG tablet TAKE ONE TABLET BY MOUTH EACH EVENING Yes Melissa Aldridge, DO   fenofibrate (TRIGLIDE) 160 MG tablet TAKE ONE TABLET BY MOUTH DAILY Yes Sudha Ser, DO   SURE COMFORT PEN NEEDLES 31G X 5 MM MISC USE WITH LANTUS SOLOSTAR PENS Yes Jonnie Dia MD   BL GLUCOSAMINE-CHONDROITIN PO Take by mouth Yes Historical Provider, MD   Biotin 30816 MCG TABS Take by mouth Yes Historical Provider, MD   timolol (TIMOPTIC) 0.25 % ophthalmic solution 1 drop 2 times daily Yes Historical Provider, MD   bimatoprost (LUMIGAN) 0.01 % SOLN ophthalmic drops Place 1 drop into both eyes nightly Yes Historical Provider, MD   Lancets MISC Use to check sugar once daily MAY SUBSTITUTE PER INSURANCE Yes Deborah Gómez MD   Insulin Pen Needle 32G X 4 MM MISC 1 each by Does not apply route daily Yes Jonnie Dia MD   melatonin (RA MELATONIN) 3 MG TABS tablet Take 10 mg by mouth nightly Indications: Pt is taking 2 tab (5 mg) daily. Total 10 mg daily. Yes MECHE Argueta - CNP   Cholecalciferol (VITAMIN D3) 400 UNIT/ML LIQD Take 5,000 Int'l Units by mouth daily.  Yes Historical Provider, MD   Blood Glucose Monitoring Suppl (TRUE METRIX METER) w/Device KIT 1 each by Does not apply route daily  Patient not taking: Reported on 4/1/2021  Karin Soler DO          Vitals:    04/01/21 1502   BP: 117/60   Pulse: 71   Resp: 16   Temp: 96.4 °F (35.8 °C)   TempSrc: Tympanic   SpO2: 97%   Weight: 136 lb 3.2 oz (61.8 kg)      Wt Readings from Last 3 Encounters:   04/01/21 136 lb 3.2 oz (61.8 kg)   09/29/20 137 lb 3.2 oz (62.2 kg)   07/22/20 135 lb 6.4 oz (61.4 kg)     BP Readings from Last 3 Encounters:   04/01/21 117/60   09/29/20 118/76   07/22/20 111/66       Patient Active Problem List   Diagnosis    Rotator cuff tear    Telangiectasia    Plantar fasciitis    Cervicalgia    Keratosis    Degenerative joint disease    Disc disorder of cervical region    Carpal tunnel syndrome    Peripheral neuropathy    Vitamin D deficiency    Pyriformis syndrome    Essential hypertension    Uncontrolled type 2 diabetes mellitus without complication, without long-term current use of insulin    Primary insomnia    Mixed hyperlipidemia    Hypotestosteronemia    Alopecia    Controlled type 2 diabetes mellitus with microalbuminuria, with long-term current use of insulin (HCC)    Basal cell carcinoma of nose       Immunization History   Administered Date(s) Administered    COVID-19, Pfizer, PF, 30mcg/0.3mL 03/09/2021, 03/30/2021    Influenza 10/05/2010, 10/11/2011, 10/16/2012, 10/14/2013    Influenza Virus Vaccine 09/29/2015    Influenza Whole 02/08/2010    Influenza, High Dose (Fluzone 65 yrs and older) 10/26/2007, 11/04/2008, 09/08/2009, 02/08/2010, 10/03/2016, 01/12/2018, 09/12/2018, 10/15/2019    Influenza, High-dose, Quadv, 65 yrs +, IM (Fluzone) 09/29/2020    Pneumococcal Conjugate 13-valent ([id number]) 03/12/2015    Pneumococcal Polysaccharide ([id number]) 10/26/2007, 01/15/2013    Tdap (Boostrix, Adacel) 01/24/2017    Zoster Live (Zostavax) 10/06/2009    Zoster Recombinant (Shingrix) 05/12/2018, 08/01/2018       Past Medical History:   Diagnosis Date    Anesthesia     hx of reaction to medications can have \"opposite\" effect with sedatives and causes patient to be jittery, agitated. Patient has concern what will be given.      Arthritis     Diabetic eye exam ([facility] [address] 08.24.15    Dr Yue Alford Diabetic eye exam [facility]) 01/11/2017    Diabetic eye exam ([facility] [place] [address] 01/09/2019    Hyperlipidemia     Hypertension     Pneumonia 1996    Type II or unspecified type diabetes mellitus without mention of complication, not stated as uncontrolled      Past Surgical History:   Procedure Laterality Date    CARPAL TUNNEL RELEASE      bilateral    COLONOSCOPY  5/2013    HYSTERECTOMY      partial    MASTECTOMY, PARTIAL Left 07/18/2013    LEFT BREAST NEEDLE LOCALIZED PARTIAL MASTECTOMY EXCISIONAL    OTHER SURGICAL HISTORY      medications for sedation can make her jittery    TUBAL LIGATION Family History   Problem Relation Age of Onset    High Blood Pressure Mother     High Cholesterol Mother     Depression Mother     High Blood Pressure Brother     High Cholesterol Brother     Diabetes Maternal Grandmother      Social History     Socioeconomic History    Marital status:      Spouse name: Not on file    Number of children: Not on file    Years of education: Not on file    Highest education level: Not on file   Occupational History    Not on file   Social Needs    Financial resource strain: Not hard at all   Brainerd-Jermaine insecurity     Worry: Never true     Inability: Never true    Transportation needs     Medical: No     Non-medical: No   Tobacco Use    Smoking status: Former Smoker     Packs/day: 0.25     Years: 51.00     Pack years: 12.75     Types: Cigarettes     Quit date: 3/9/2016     Years since quittin.0    Smokeless tobacco: Never Used    Tobacco comment: intermittent smoking over last 46 yrs. Doroteo Zepeda has a cigarette now. Quit routine smoking    Substance and Sexual Activity    Alcohol use: Yes     Alcohol/week: 0.0 standard drinks     Comment: Occas.  x 2-3/ month    Drug use: No    Sexual activity: Not on file   Lifestyle    Physical activity     Days per week: Not on file     Minutes per session: Not on file    Stress: Not on file   Relationships    Social connections     Talks on phone: Not on file     Gets together: Not on file     Attends Temple service: Not on file     Active member of club or organization: Not on file     Attends meetings of clubs or organizations: Not on file     Relationship status: Not on file    Intimate partner violence     Fear of current or ex partner: Not on file     Emotionally abused: Not on file     Physically abused: Not on file     Forced sexual activity: Not on file   Other Topics Concern    Not on file   Social History Narrative    Not on file       O: /60   Pulse 71   Temp 96.4 °F (35.8 °C) (Tympanic) Resp 16   Wt 136 lb 3.2 oz (61.8 kg)   LMP  (LMP Unknown)   SpO2 97%   Breastfeeding No   BMI 23.02 kg/m²   Physical Exam  GEN: No acute distress,cooperative, well nourished, alert. HEENT: PEERLA, EOMI , normocephalic/atraumatic, external nose appears normal.  External ear is normal.    Neck: soft, supple, no appreciable thyromegaly,mass  CV: No upper extremity edema. Resp:  Breathing comfortably. Psych:normal affect. Neuro: AOx3  Other Pertinent Physical Exam findings: There is mild tenderness to the left piriformis with palpation. Straight leg raise test is negative bilaterally. ASSESSMENT   Diagnosis Orders   1. Piriformis syndrome, left     2. Sacral pain     3. Vitamin D deficiency  VITAMIN D 25 HYDROXY   4. Insulin dependent type 2 diabetes mellitus (HCC)  Hemoglobin A1C    COMPREHENSIVE METABOLIC PANEL    LIPID PANEL    CBC   5. Essential hypertension  TSH with Reflex   6. Feeling worried         Discussed strategies for #1 and #2. She does have an exercise  for her  and her that helps about 3 times a week. She will try to incorporate stretches and get back to me if no improvement. #3-5: Due for lab work. Continue partnership with endocrinologist for #4. Has had deficiency of vitamin D and will check level. #6:  Fair amount of the visit today was Tammy's concern about her 's worsening memory. He typically has performed a couple taxes but she suspects that some of his memory issues are interfering. I offered antidepressant medication which she declined which I think is reasonable at this stage.   I did encourage that she resume talk therapy with talk therapist in which she had good partnership with in the past.        PLAN          Time spent on encounter (to include any same day medical record review): 34 minutes  Established E/M: 10-19 (27240), 20-29 (18096), 30-39 (56403), 40-54 (21101)   New E/M: 15-29 (01320), 30-44 (95707), 45-59 (77247), 60-74 (17591)  Telephone E/M: 5-10 ([phone]), 11-20 (49851), 21-30 (46642)    If applicable, see additional patient information and instructions under \"Patient Instructions. \"    Return for Hypertension, and any other concerns in 3 to 6 months. Patient Instructions     [address] Zainab Adams. CONTINUE TO WORK WITH YOUR TRUSTED FAMILY AND FINANCIAL/INVESTMENT FOR [facility]. CONTINUE SAFETY PROTOCOLS. YOU COMPLETED YOUR COVID VACCINATIONS. YOU TYPICALLY WILL ACHIEVE FULL IMMUNITY BY MID-April. --You can get your lab work or x-ray done at [address] [facility]. LAB: [address]  Lab hours (7AM - 5PM Monday through Friday; 8AM - noon on Saturdays),   Ph# [phone]    X-RAY: [address]  Radiology hours, (7:00AM - 5PM Monday through Friday only)  Ph# [phone] or [phone]  --Call our office in 1 week if you have not heard about the results. Or check [facility] if you have previously enrolled. UPDATE YOUR PCP IF LEFT SIDED SACRAL/BUTTOCK PAIN IF NO SIGNIFICANTLY BETTER TO CONSIDER IMAGING VS TREATMENT          Patient Education        Piriformis Syndrome: Exercises  Introduction  Here are some examples of exercises for you to try. The exercises may be suggested for a condition or for rehabilitation. Start each exercise slowly. Ease off the exercises if you start to have pain. You will be told when to start these exercises and which ones will work best for you. How to do the exercises  Hip rotator stretch   1. Lie on your back with both knees bent and your feet flat on the floor. 2. Put the ankle of your affected leg on your opposite thigh near your knee. 3. Use your hand to gently push your knee (on your affected leg) away from your body until you feel a gentle stretch around your hip. 4. Hold the stretch for 15 to 30 seconds. 5. Repeat 2 to 4 times. 6. Switch legs and repeat steps 1 through 5. Piriformis stretch   1.  Lie on your back with your legs straight. 2. Lift your affected leg and bend your knee. With your opposite hand, reach across your body, and then gently pull your knee toward your opposite shoulder. 3. Hold the stretch for 15 to 30 seconds. 4. Repeat with your other leg. 5. Repeat 2 to 4 times on each side. Lower abdominal strengthening   1. Lie on your back with your knees bent and your feet flat on the floor. 2. Tighten your belly muscles by pulling your belly button in toward your spine. 3. Lift one foot off the floor and bring your knee toward your chest, so that your knee is straight above your hip and your leg is bent like the letter \"L. \"  4. Lift the other knee up to the same position. 5. Lower one leg at a time to the starting position. 6. Keep alternating legs until you have lifted each leg 8 to 12 times. 7. Be sure to keep your belly muscles tight and your back still as you are moving your legs. Be sure to breathe normally. Follow-up care is a key part of your treatment and safety. Be sure to make and go to all appointments, and call your doctor if you are having problems. It's also a good idea to know your test results and keep a list of the medicines you take. Current as of: November 16, 2020               Content Version: 12.8  © 2006-2021 [employer]wise[employer] Inc[employer]. Care instructions adapted under license by [facility] ([facility] and [facility]). If you have questions about a medical condition or this instruction, always ask your healthcare professional. [place] [address] any warranty or liability for your use of this information. Patient Education        Piriformis Syndrome: Care Instructions  Your Care Instructions     The piriformis muscle is deep under your rear end (buttock). One end of the muscle connects deep inside the pelvic area, and the other end attaches to the top of the thighbone. This muscle can press on the sciatic nerve that runs from your spine down your leg.  When this happens, you may have

## 2021-04-12 RX ORDER — PEN NEEDLE, DIABETIC 31 GX3/16"
NEEDLE, DISPOSABLE MISCELLANEOUS
Qty: 100 EACH | Refills: 0 | Status: SHIPPED | OUTPATIENT
Start: 2021-04-12 | End: 2021-04-13

## 2021-04-12 RX ORDER — LIRAGLUTIDE 6 MG/ML
INJECTION SUBCUTANEOUS
Qty: 9 ML | Refills: 3 | Status: SHIPPED | OUTPATIENT
Start: 2021-04-12 | End: 2021-08-12

## 2021-04-13 RX ORDER — PEN NEEDLE, DIABETIC 31 GX3/16"
NEEDLE, DISPOSABLE MISCELLANEOUS
Qty: 100 EACH | Refills: 0 | Status: SHIPPED | OUTPATIENT
Start: 2021-04-13 | End: 2021-07-15

## 2021-04-21 ENCOUNTER — PATIENT MESSAGE (OUTPATIENT)
Dept: FAMILY MEDICINE CLINIC | Age: 76
End: 2021-04-21

## 2021-04-21 DIAGNOSIS — Z13.21 ENCOUNTER FOR VITAMIN DEFICIENCY SCREENING: Primary | ICD-10-CM

## 2021-04-22 NOTE — TELEPHONE ENCOUNTER
From: Aron Mccurdy  To: Marcos Patricio DO  Sent: 4/21/2021 4:19 PM EDT  Subject: Visit [facility] Kimberly Amezcua Sender, I have not yet been to the lab for my blood panel tests, but am hoping to do it this week or next. I forgot to ask you if either Rheta Factor or I should be taking Vitamin B12 and, if so, what dosage? I was also wondering if I should have you add a request to have my B12 levels checked when I do go to the lab. Thanks in advance for your advice.  Jimbo Barnhart

## 2021-04-27 DIAGNOSIS — Z13.21 ENCOUNTER FOR VITAMIN DEFICIENCY SCREENING: ICD-10-CM

## 2021-04-27 DIAGNOSIS — E55.9 VITAMIN D DEFICIENCY: ICD-10-CM

## 2021-04-27 DIAGNOSIS — I10 ESSENTIAL HYPERTENSION: ICD-10-CM

## 2021-04-27 DIAGNOSIS — Z79.4 INSULIN DEPENDENT TYPE 2 DIABETES MELLITUS (HCC): ICD-10-CM

## 2021-04-27 DIAGNOSIS — E11.9 INSULIN DEPENDENT TYPE 2 DIABETES MELLITUS (HCC): ICD-10-CM

## 2021-04-27 LAB
A/G RATIO: 2.6 (ref 1.1–2.2)
ALBUMIN SERPL-MCNC: 4.2 G/DL (ref 3.4–5)
ALP BLD-CCNC: 47 U/L (ref 40–129)
ALT SERPL-CCNC: 15 U/L (ref 10–40)
ANION GAP SERPL CALCULATED.3IONS-SCNC: 11 MMOL/L (ref 3–16)
AST SERPL-CCNC: 22 U/L (ref 15–37)
BILIRUB SERPL-MCNC: <0.2 MG/DL (ref 0–1)
BUN BLDV-MCNC: 18 MG/DL (ref 7–20)
CALCIUM SERPL-MCNC: 8.9 MG/DL (ref 8.3–10.6)
CHLORIDE BLD-SCNC: 110 MMOL/L (ref 99–110)
CHOLESTEROL, TOTAL: 113 MG/DL (ref 0–199)
CO2: 24 MMOL/L (ref 21–32)
CREAT SERPL-MCNC: 1.1 MG/DL (ref 0.6–1.2)
GFR AFRICAN AMERICAN: 59
GFR NON-AFRICAN AMERICAN: 49
GLOBULIN: 1.6 G/DL
GLUCOSE BLD-MCNC: 105 MG/DL (ref 70–99)
HCT VFR BLD CALC: 38 % (ref 36–48)
HDLC SERPL-MCNC: 38 MG/DL (ref 40–60)
HEMOGLOBIN: 12.1 G/DL (ref 12–16)
LDL CHOLESTEROL CALCULATED: 55 MG/DL
MCH RBC QN AUTO: 27.2 PG (ref 26–34)
MCHC RBC AUTO-ENTMCNC: 31.9 G/DL (ref 31–36)
MCV RBC AUTO: 85.2 FL (ref 80–100)
PDW BLD-RTO: 15.5 % (ref 12.4–15.4)
PLATELET # BLD: 185 K/UL (ref 135–450)
PMV BLD AUTO: 10.5 FL (ref 5–10.5)
POTASSIUM SERPL-SCNC: 4.3 MMOL/L (ref 3.5–5.1)
RBC # BLD: 4.46 M/UL (ref 4–5.2)
SODIUM BLD-SCNC: 145 MMOL/L (ref 136–145)
TOTAL PROTEIN: 5.8 G/DL (ref 6.4–8.2)
TRIGL SERPL-MCNC: 99 MG/DL (ref 0–150)
TSH REFLEX: 1.34 UIU/ML (ref 0.27–4.2)
VLDLC SERPL CALC-MCNC: 20 MG/DL
WBC # BLD: 5.2 K/UL (ref 4–11)

## 2021-04-28 LAB
ESTIMATED AVERAGE GLUCOSE: 162.8 MG/DL
HBA1C MFR BLD: 7.3 %
VITAMIN B-12: 520 PG/ML (ref 211–911)
VITAMIN D 25-HYDROXY: 74.5 NG/ML

## 2021-05-14 DIAGNOSIS — Z79.4 INSULIN DEPENDENT TYPE 2 DIABETES MELLITUS (HCC): ICD-10-CM

## 2021-05-14 DIAGNOSIS — E78.2 MIXED HYPERLIPIDEMIA: ICD-10-CM

## 2021-05-14 DIAGNOSIS — E11.9 INSULIN DEPENDENT TYPE 2 DIABETES MELLITUS (HCC): ICD-10-CM

## 2021-05-14 RX ORDER — ROSUVASTATIN CALCIUM 40 MG/1
TABLET, COATED ORAL
Qty: 90 TABLET | Refills: 1 | Status: SHIPPED | OUTPATIENT
Start: 2021-05-14 | End: 2021-11-09

## 2021-05-14 RX ORDER — LISINOPRIL 10 MG/1
TABLET ORAL
Qty: 90 TABLET | Refills: 1 | Status: SHIPPED | OUTPATIENT
Start: 2021-05-14 | End: 2022-05-05

## 2021-05-14 RX ORDER — FENOFIBRATE 160 MG/1
TABLET ORAL
Qty: 90 TABLET | Refills: 1 | Status: SHIPPED | OUTPATIENT
Start: 2021-05-14 | End: 2021-08-12

## 2021-05-16 RX ORDER — EMPAGLIFLOZIN 25 MG/1
TABLET, FILM COATED ORAL
Qty: 30 TABLET | Refills: 5 | Status: SHIPPED | OUTPATIENT
Start: 2021-05-16 | End: 2022-01-13

## 2021-05-17 RX ORDER — INSULIN GLARGINE 100 [IU]/ML
INJECTION, SOLUTION SUBCUTANEOUS
Qty: 15 ML | Refills: 3 | OUTPATIENT
Start: 2021-05-17

## 2021-05-17 RX ORDER — INSULIN GLARGINE 100 [IU]/ML
INJECTION, SOLUTION SUBCUTANEOUS
Qty: 15 ML | Refills: 3 | Status: SHIPPED | OUTPATIENT
Start: 2021-05-17 | End: 2021-08-12

## 2021-07-15 RX ORDER — BLOOD SUGAR DIAGNOSTIC
STRIP MISCELLANEOUS
Qty: 100 EACH | Refills: 3 | Status: SHIPPED | OUTPATIENT
Start: 2021-07-15 | End: 2022-03-15 | Stop reason: CLARIF

## 2021-08-12 DIAGNOSIS — E78.2 MIXED HYPERLIPIDEMIA: ICD-10-CM

## 2021-08-12 RX ORDER — INSULIN GLARGINE 100 [IU]/ML
INJECTION, SOLUTION SUBCUTANEOUS
Qty: 15 ML | Refills: 11 | Status: SHIPPED | OUTPATIENT
Start: 2021-08-12 | End: 2022-01-13

## 2021-08-12 RX ORDER — FENOFIBRATE 160 MG/1
TABLET ORAL
Qty: 90 TABLET | Refills: 2 | Status: SHIPPED | OUTPATIENT
Start: 2021-08-12 | End: 2022-05-05

## 2021-08-12 RX ORDER — LIRAGLUTIDE 6 MG/ML
INJECTION SUBCUTANEOUS
Qty: 9 ML | Refills: 11 | Status: SHIPPED | OUTPATIENT
Start: 2021-08-12 | End: 2022-08-09

## 2021-08-12 RX ORDER — METFORMIN HYDROCHLORIDE 500 MG/1
TABLET, EXTENDED RELEASE ORAL
Qty: 180 TABLET | Refills: 2 | Status: SHIPPED | OUTPATIENT
Start: 2021-08-12 | End: 2022-03-14 | Stop reason: SDUPTHER

## 2021-09-23 ENCOUNTER — HOSPITAL ENCOUNTER (OUTPATIENT)
Dept: MAMMOGRAPHY | Age: 76
Discharge: HOME | End: 2021-09-23
Payer: MEDICARE

## 2021-09-23 VITALS — WEIGHT: 135 LBS | BODY MASS INDEX: 23.05 KG/M2 | HEIGHT: 64 IN

## 2021-09-23 DIAGNOSIS — Z12.31 VISIT FOR SCREENING MAMMOGRAM: ICD-10-CM

## 2021-09-23 PROCEDURE — 77063 BREAST TOMOSYNTHESIS BI: CPT

## 2021-09-23 PROCEDURE — 77067 SCR MAMMO BI INCL CAD: CPT

## 2021-10-11 ENCOUNTER — TELEPHONE (OUTPATIENT)
Dept: FAMILY MEDICINE CLINIC | Age: 76
End: 2021-10-11

## 2021-10-11 ENCOUNTER — OFFICE VISIT (OUTPATIENT)
Dept: FAMILY MEDICINE CLINIC | Age: 76
End: 2021-10-11
Payer: MEDICARE

## 2021-10-11 VITALS
HEIGHT: 63 IN | SYSTOLIC BLOOD PRESSURE: 118 MMHG | DIASTOLIC BLOOD PRESSURE: 52 MMHG | HEART RATE: 77 BPM | WEIGHT: 135.2 LBS | RESPIRATION RATE: 12 BRPM | TEMPERATURE: 97.2 F | BODY MASS INDEX: 23.96 KG/M2 | OXYGEN SATURATION: 95 %

## 2021-10-11 DIAGNOSIS — E55.9 VITAMIN D DEFICIENCY: ICD-10-CM

## 2021-10-11 DIAGNOSIS — Z00.00 ROUTINE GENERAL MEDICAL EXAMINATION AT A HEALTH CARE FACILITY: Primary | ICD-10-CM

## 2021-10-11 DIAGNOSIS — E78.2 MIXED HYPERLIPIDEMIA: ICD-10-CM

## 2021-10-11 DIAGNOSIS — R80.9 CONTROLLED TYPE 2 DIABETES MELLITUS WITH MICROALBUMINURIA, WITH LONG-TERM CURRENT USE OF INSULIN (HCC): ICD-10-CM

## 2021-10-11 DIAGNOSIS — E11.29 CONTROLLED TYPE 2 DIABETES MELLITUS WITH MICROALBUMINURIA, WITH LONG-TERM CURRENT USE OF INSULIN (HCC): ICD-10-CM

## 2021-10-11 DIAGNOSIS — Z79.4 CONTROLLED TYPE 2 DIABETES MELLITUS WITH MICROALBUMINURIA, WITH LONG-TERM CURRENT USE OF INSULIN (HCC): ICD-10-CM

## 2021-10-11 DIAGNOSIS — I10 ESSENTIAL HYPERTENSION: ICD-10-CM

## 2021-10-11 PROCEDURE — 4040F PNEUMOC VAC/ADMIN/RCVD: CPT | Performed by: FAMILY MEDICINE

## 2021-10-11 PROCEDURE — 3051F HG A1C>EQUAL 7.0%<8.0%: CPT | Performed by: FAMILY MEDICINE

## 2021-10-11 PROCEDURE — 1123F ACP DISCUSS/DSCN MKR DOCD: CPT | Performed by: FAMILY MEDICINE

## 2021-10-11 PROCEDURE — 3017F COLORECTAL CA SCREEN DOC REV: CPT | Performed by: FAMILY MEDICINE

## 2021-10-11 PROCEDURE — G0439 PPPS, SUBSEQ VISIT: HCPCS | Performed by: FAMILY MEDICINE

## 2021-10-11 PROCEDURE — G8484 FLU IMMUNIZE NO ADMIN: HCPCS | Performed by: FAMILY MEDICINE

## 2021-10-11 ASSESSMENT — PATIENT HEALTH QUESTIONNAIRE - PHQ9
SUM OF ALL RESPONSES TO PHQ QUESTIONS 1-9: 0
SUM OF ALL RESPONSES TO PHQ9 QUESTIONS 1 & 2: 0
2. FEELING DOWN, DEPRESSED OR HOPELESS: 0
1. LITTLE INTEREST OR PLEASURE IN DOING THINGS: 0
SUM OF ALL RESPONSES TO PHQ QUESTIONS 1-9: 0
SUM OF ALL RESPONSES TO PHQ QUESTIONS 1-9: 0

## 2021-10-11 ASSESSMENT — LIFESTYLE VARIABLES
HOW OFTEN DO YOU HAVE A DRINK CONTAINING ALCOHOL: 0
AUDIT-C TOTAL SCORE: INCOMPLETE
HOW OFTEN DO YOU HAVE A DRINK CONTAINING ALCOHOL: NEVER
AUDIT TOTAL SCORE: INCOMPLETE

## 2021-10-11 NOTE — PROGRESS NOTES
Medicare Annual Wellness Visit  Name: Jayson Monique Date: 10/12/2021   MRN: <[id number]> Sex: Female   Age: 78 y.o. Ethnicity: Non- / Non    : 1945 Race: White (non-)      Julianne Reyes is here for Medicare AWV    Screenings for behavioral, psychosocial and functional/safety risks, and cognitive dysfunction are all negative except as indicated below. These results, as well as other patient data from the [address] form, are documented in Flowsheets linked to this Encounter. Otherwise feels well. Has ongoing concerns about her 's increased anxiousness about his own health. Allergies   Allergen Reactions    Benadryl [Diphenhydramine]     Erythromycin Nausea And Vomiting         Prior to Visit Medications    Medication Sig Taking? Authorizing Provider   LANTUS SOLOSTAR 100 UNIT/ML injection pen INJECT 41 UNITS SUBCUTANEOUSLY DAILY Yes Rikki Soler DO   VICTOZA 18 MG/3ML SOPN SC injection INJECT 1.8MG SUBCUTANEOUSLY DAILY Yes Nora Soler, DO   fenofibrate (TRIGLIDE) 160 MG tablet TAKE ONE TABLET BY MOUTH DAILY Yes Nora Soler DO   metFORMIN (GLUCOPHAGE-XR) 500 MG extended release tablet TAKE ONE TABLET BY MOUTH TWICE DAILY (TAKE WITH FOOD) Yes Nora Soler DO   ADVOCATE INSULIN PEN NEEDLES 31G X 5 MM MISC USE WITH LANTUS SOLOSTAR PENS Yes Jorge Kathleen MD   JARDIANCE 25 MG tablet TAKE ONE TABLET BY MOUTH DAILY Yes Nora Soler DO   rosuvastatin (CRESTOR) 40 MG tablet TAKE ONE TABLET BY MOUTH EACH EVENING Yes Nora Soler DO   lisinopril (PRINIVIL;ZESTRIL) 10 MG tablet TAKE ONE TABLET DAILY Yes Nora Soler, DO   blood glucose test strips (ONETOUCH VERIO) strip 1 each by In Vitro route daily As needed.  Yes Jorge Kathleen MD   BL GLUCOSAMINE-CHONDROITIN PO Take by mouth Yes Historical Provider, MD   Biotin 86053 MCG TABS Take by mouth Yes Historical Provider, MD   timolol (TIMOPTIC) 0.25 % ophthalmic solution 1 drop 2 times daily Yes Historical Provider, MD   bimatoprost (LUMIGAN) 0.01 % SOLN ophthalmic drops Place 1 drop into both eyes nightly Yes Historical Provider, MD   Lancets MISC Use to check sugar once daily MAY SUBSTITUTE PER INSURANCE Yes Marlene Mann MD   Insulin Pen Needle 32G X 4 MM MISC 1 each by Does not apply route daily Yes Olamide Triana MD   melatonin (RA MELATONIN) 3 MG TABS tablet Take 10 mg by mouth nightly Indications: Pt is taking 2 tab (5 mg) daily. Total 10 mg daily. Yes MECHE Hurtado CNP   Cholecalciferol (VITAMIN D3) 400 UNIT/ML LIQD Take 5,000 Int'l Units by mouth daily. Yes Historical Provider, MD   Blood Glucose Monitoring Suppl (TRUE METRIX METER) w/Device KIT 1 each by Does not apply route daily  Patient not taking: Reported on 4/1/2021  Ole Kehr, DO         Past Medical History:   Diagnosis Date    Anesthesia     hx of reaction to medications can have \"opposite\" effect with sedatives and causes patient to be jittery, agitated. Patient has concern what will be given.      Arthritis     Diabetic eye exam ([facility] [address] 08.24.15    Dr Rosina Hurtado Diabetic eye exam [facility]) 01/11/2017    Diabetic eye exam ([facility] [address] 01/09/2019    Hyperlipidemia     Hypertension     Pneumonia 1996    Type II or unspecified type diabetes mellitus without mention of complication, not stated as uncontrolled        Past Surgical History:   Procedure Laterality Date    BREAST BIOPSY Left     benign excisional biopsy    CARPAL TUNNEL RELEASE      bilateral    COLONOSCOPY  5/2013    HYSTERECTOMY      partial    MASTECTOMY, PARTIAL Left 07/18/2013    LEFT BREAST NEEDLE LOCALIZED PARTIAL MASTECTOMY EXCISIONAL    OTHER SURGICAL HISTORY      medications for sedation can make her jittery    TUBAL LIGATION           Family History   Problem Relation Age of Onset    High Blood Pressure Mother     High Cholesterol Mother     Depression Mother     High Blood Pressure Brother     High Cholesterol Brother     Diabetes Maternal Grandmother        CareTeam (Including outside providers/suppliers regularly involved in providing care):   Patient Care Team:  Lavell Galdamez DO as PCP - General (Family Medicine)  Lavell Galdamez DO as PCP - [facility]  Jose F Gonzáles (Ophthalmology)  Jaqui Lima MD as Physician (Obstetrics & Gynecology)    [facility] Readings from Last 3 Encounters:   10/11/21 135 lb 3.2 oz (61.3 kg)   09/23/21 135 lb (61.2 kg)   04/01/21 136 lb 3.2 oz (61.8 kg)     Vitals:    10/11/21 1528   BP: (!) 118/52   Pulse: 77   Resp: 12   Temp: 97.2 °F (36.2 °C)   TempSrc: Temporal   SpO2: 95%   Weight: 135 lb 3.2 oz (61.3 kg)   Height: 5' 3.2\" (1.605 m)     Body mass index is 23.8 kg/m². Based upon direct observation of the patient, evaluation of cognition reveals recent and remote memory intact. General Appearance: alert and oriented to person, place and time, well-developed and well-nourished, in no acute distress    Patient's complete Health Risk Assessment and screening values have been reviewed and are found in Flowsheets. The following problems were reviewed today and where indicated follow up appointments were made and/or referrals ordered. Positive Risk Factor Screenings with Interventions:               No Positive Risk Factors identified today.     Personalized Preventive Plan   Current Health Maintenance Status  Immunization History   Administered Date(s) Administered    COVID-19, Muñiz Peter, PF, 30mcg/0.3mL 03/09/2021, 03/30/2021    Influenza 10/05/2010, 10/11/2011, 10/16/2012, 10/14/2013    Influenza Virus Vaccine 09/29/2015    Influenza Whole 02/08/2010    Influenza, High Dose (Fluzone 65 yrs and older) 10/26/2007, 11/04/2008, 09/08/2009, 02/08/2010, 10/03/2016, 01/12/2018, 09/12/2018, 10/15/2019    Influenza, High-dose, Quadv, 65 yrs +, IM (Fluzone) 09/29/2020    Pneumococcal Conjugate 13-valent (Grant Liz) 03/12/2015    Pneumococcal Polysaccharide ([id number]) 10/26/2007, 01/15/2013    Tdap (Boostrix, Adacel) 01/24/2017    Zoster Live (Zostavax) 10/06/2009    Zoster Recombinant (Shingrix) 05/12/2018, 08/01/2018        Health Maintenance   Topic Date Due    Annual Wellness Visit (AWV)  Never done    Diabetic foot exam  04/08/2020    Flu vaccine (1) 09/01/2021    A1C test (Diabetic or Prediabetic)  04/27/2022    Lipid screen  04/27/2022    Potassium monitoring  04/27/2022    Creatinine monitoring  04/27/2022    Colon cancer screen colonoscopy  05/09/2022    Diabetic retinal exam  01/15/2023    Breast cancer screen  09/23/2023    DTaP/Tdap/Td vaccine (2 - Td or Tdap) 01/24/2027    DEXA (modify frequency per FRAX score)  Completed    Shingles Vaccine  Completed    Pneumococcal 65+ years Vaccine  Completed    COVID-19 Vaccine  Completed    Hepatitis C screen  Completed    Hepatitis A vaccine  Aged Out    Hib vaccine  Aged Out    Meningococcal (ACWY) vaccine  Aged Out     Recommendations for KDW Due: see orders and patient instructions/AVS.  . Recommended screening schedule for the next 5-10 years is provided to the patient in written form: see Patient Instructions/AVS.    Yenny Alberts was seen today for medicare awv. Diagnoses and all orders for this visit:    Routine general medical examination at a health care facility    Mixed hyperlipidemia  -     LIPID PANEL; Future    Vitamin D deficiency  -     VITAMIN D 25 HYDROXY; Future    Essential hypertension  -     COMPREHENSIVE METABOLIC PANEL; Future  -     CBC; Future    Controlled type 2 diabetes mellitus with microalbuminuria, with long-term current use of insulin (HCC)              Diagnosis Orders   1. Routine general medical examination at a health care facility     2. Mixed hyperlipidemia  LIPID PANEL   3. Vitamin D deficiency  VITAMIN D 25 HYDROXY   4. Essential hypertension  COMPREHENSIVE METABOLIC PANEL    CBC   5.  Controlled type 2 diabetes mellitus with microalbuminuria, with long-term current use of insulin ([address]     #1: Discussed preferred sequence of vaccinations regarding Covid booster and flu shot. #2-4: The current medical regimen is effective;  continue present plan and medications. Although diastolic is 52 today the mean arterial pressure is 74 which is within the normal range of 60 and higher. Lab orders in place for #2 through #4. #5: She is doing excellent job with blood sugar control. She will see her endocrinologist soon. Will obtain podiatry foot record from Dr. Ginny Prasad.

## 2021-10-11 NOTE — TELEPHONE ENCOUNTER
Please obtain [facility] podiatry info for last foot check to meet health maintenance requirement for yearly diabetic foot check.

## 2021-10-11 NOTE — PATIENT INSTRUCTIONS
See   Your endocrinologist before you leave town. Get your A1C done through his office (Dr. Dinah Joseph). PLAN TO COMPLETE YOUR PFIZER BOOSTER AGAINST COVID 19 SOON. EVENTUALLY DO YOUR FLU SHOT ANY TIME APPROX 2 WEEKS OR AFTER (AND BEFORE DEC 1). YOU CAN  CALL TO GET YOUR HIGH DOSE FLU SHOT THROUGH THIS CLINIC. FASTING LAB ORDERS ARE IN THE SYSTEM. PLEASE COMPLETE NO LATER THAN April 2022. Personalized Preventive Plan for Rachel Alcocer - 10/11/2021  Medicare offers a range of preventive health benefits. Some of the tests and screenings are paid in full while other may be subject to a deductible, co-insurance, and/or copay. Some of these benefits include a comprehensive review of your medical history including lifestyle, illnesses that may run in your family, and various assessments and screenings as appropriate. After reviewing your medical record and screening and assessments performed today your provider may have ordered immunizations, labs, imaging, and/or referrals for you. A list of these orders (if applicable) as well as your Preventive Care list are included within your After Visit Summary for your review. Other Preventive Recommendations:    · A preventive eye exam performed by an eye specialist is recommended every 1-2 years to screen for glaucoma; cataracts, macular degeneration, and other eye disorders. · A preventive dental visit is recommended every 6 months. · Try to get at least 150 minutes of exercise per week or 10,000 steps per day on a pedometer . · Order or download the FREE \"Exercise & Physical Activity: Your Everyday Guide\" from [employer]. Call 7-928.842.4251 or search [employer] online. · You need 8758-6678 mg of calcium and 0210-7170 IU of vitamin D per day.  It is possible to meet your calcium requirement with diet alone, but a vitamin D supplement is usually necessary to meet this goal.  · When exposed to the sun, use a sunscreen that protects against both UVA and UVB radiation with an SPF of 30 or greater. Reapply every 2 to 3 hours or after sweating, drying off with a towel, or swimming. · Always wear a seat belt when traveling in a car. Always wear a helmet when riding a bicycle or motorcycle.

## 2021-10-12 NOTE — TELEPHONE ENCOUNTER
They are faxing over her last check up with them. They have ATTN me on them so that I can document that we have received it.

## 2021-11-08 DIAGNOSIS — E78.2 MIXED HYPERLIPIDEMIA: ICD-10-CM

## 2021-11-08 NOTE — TELEPHONE ENCOUNTER
Requested Prescriptions     Pending Prescriptions Disp Refills    rosuvastatin (CRESTOR) 40 MG tablet [Pharmacy Med Name: ROSUVASTATIN CALCIUM 40 MG 40 Tablet] 90 tablet 1     Sig: TAKE ONE TABLET BY MOUTH EACH EVENING     Last ov 10/11/21  Last lab 10/11/21  Next ov 4/11/22

## 2021-11-09 RX ORDER — ROSUVASTATIN CALCIUM 40 MG/1
TABLET, COATED ORAL
Qty: 90 TABLET | Refills: 1 | Status: SHIPPED | OUTPATIENT
Start: 2021-11-09 | End: 2022-05-05

## 2022-01-13 DIAGNOSIS — Z79.4 INSULIN DEPENDENT TYPE 2 DIABETES MELLITUS (HCC): ICD-10-CM

## 2022-01-13 DIAGNOSIS — E11.9 INSULIN DEPENDENT TYPE 2 DIABETES MELLITUS (HCC): ICD-10-CM

## 2022-01-13 RX ORDER — EMPAGLIFLOZIN 25 MG/1
TABLET, FILM COATED ORAL
Qty: 90 TABLET | Refills: 2 | Status: SHIPPED | OUTPATIENT
Start: 2022-01-13

## 2022-01-13 RX ORDER — INSULIN GLARGINE 100 [IU]/ML
INJECTION, SOLUTION SUBCUTANEOUS
Qty: 45 ML | Refills: 2 | Status: SHIPPED | OUTPATIENT
Start: 2022-01-13 | End: 2022-08-09

## 2022-01-13 NOTE — TELEPHONE ENCOUNTER
Requested Prescriptions     Pending Prescriptions Disp Refills    JARDIANCE 25 MG tablet [Pharmacy Med Name: Jf Israeljossy 25 MG TABLET 25 Tablet] 90 tablet 2     Sig: TAKE ONE TABLET BY MOUTH DAILY    LANTUS SOLOSTAR 100 UNIT/ML injection pen [Pharmacy Med Name: LANTUS SOLOSTAR 100 UNITS/M 100 Solution Pen-injector] 45 mL 2     Sig: INJECT 41 UNITS SUBCUTANEOUSLY DAILY     LAST OV 10/11/2021  LAST LABS 04/27/2021

## 2022-01-17 ENCOUNTER — TELEPHONE (OUTPATIENT)
Dept: FAMILY MEDICINE CLINIC | Age: 77
End: 2022-01-17

## 2022-01-17 NOTE — TELEPHONE ENCOUNTER
Prior authorization for Victoza 18 MG/3ML SOPN SC injection has been scanned in and sent to the PA Department.

## 2022-01-18 NOTE — TELEPHONE ENCOUNTER
Please call patient or send a MyChart message regarding the recent denial of the Victoza. Record review shows that Saira Yancey has been on the [address] at least since 2012. Has she and past doctors had to prove medical necessity in the past for Victoza? What documentation was helpful?

## 2022-03-11 DIAGNOSIS — I10 ESSENTIAL HYPERTENSION: ICD-10-CM

## 2022-03-11 DIAGNOSIS — E78.2 MIXED HYPERLIPIDEMIA: ICD-10-CM

## 2022-03-11 DIAGNOSIS — E55.9 VITAMIN D DEFICIENCY: ICD-10-CM

## 2022-03-11 LAB
A/G RATIO: 2.5 (ref 1.1–2.2)
ALBUMIN SERPL-MCNC: 4.7 G/DL (ref 3.4–5)
ALP BLD-CCNC: 52 U/L (ref 40–129)
ALT SERPL-CCNC: 12 U/L (ref 10–40)
ANION GAP SERPL CALCULATED.3IONS-SCNC: 13 MMOL/L (ref 3–16)
AST SERPL-CCNC: 20 U/L (ref 15–37)
BILIRUB SERPL-MCNC: <0.2 MG/DL (ref 0–1)
BUN BLDV-MCNC: 22 MG/DL (ref 7–20)
CALCIUM SERPL-MCNC: 10.3 MG/DL (ref 8.3–10.6)
CHLORIDE BLD-SCNC: 107 MMOL/L (ref 99–110)
CHOLESTEROL, TOTAL: 135 MG/DL (ref 0–199)
CO2: 24 MMOL/L (ref 21–32)
CREAT SERPL-MCNC: 1.2 MG/DL (ref 0.6–1.2)
GFR AFRICAN AMERICAN: 53
GFR NON-AFRICAN AMERICAN: 44
GLUCOSE BLD-MCNC: 139 MG/DL (ref 70–99)
HCT VFR BLD CALC: 41.6 % (ref 36–48)
HDLC SERPL-MCNC: 50 MG/DL (ref 40–60)
HEMOGLOBIN: 13.3 G/DL (ref 12–16)
LDL CHOLESTEROL CALCULATED: 64 MG/DL
MCH RBC QN AUTO: 27.2 PG (ref 26–34)
MCHC RBC AUTO-ENTMCNC: 32 G/DL (ref 31–36)
MCV RBC AUTO: 85 FL (ref 80–100)
PDW BLD-RTO: 14.7 % (ref 12.4–15.4)
PLATELET # BLD: 274 K/UL (ref 135–450)
PMV BLD AUTO: 9.4 FL (ref 5–10.5)
POTASSIUM SERPL-SCNC: 4.3 MMOL/L (ref 3.5–5.1)
RBC # BLD: 4.9 M/UL (ref 4–5.2)
SODIUM BLD-SCNC: 144 MMOL/L (ref 136–145)
TOTAL PROTEIN: 6.6 G/DL (ref 6.4–8.2)
TRIGL SERPL-MCNC: 103 MG/DL (ref 0–150)
VITAMIN D 25-HYDROXY: 43.9 NG/ML
VLDLC SERPL CALC-MCNC: 21 MG/DL
WBC # BLD: 5.7 K/UL (ref 4–11)

## 2022-03-14 ENCOUNTER — OFFICE VISIT (OUTPATIENT)
Dept: ENDOCRINOLOGY | Age: 77
End: 2022-03-14
Payer: MEDICARE

## 2022-03-14 VITALS
HEART RATE: 71 BPM | SYSTOLIC BLOOD PRESSURE: 100 MMHG | OXYGEN SATURATION: 98 % | WEIGHT: 135 LBS | HEIGHT: 63 IN | DIASTOLIC BLOOD PRESSURE: 57 MMHG | BODY MASS INDEX: 23.92 KG/M2

## 2022-03-14 DIAGNOSIS — E11.65 UNCONTROLLED TYPE 2 DIABETES MELLITUS WITH HYPERGLYCEMIA (HCC): Primary | ICD-10-CM

## 2022-03-14 DIAGNOSIS — I10 PRIMARY HYPERTENSION: ICD-10-CM

## 2022-03-14 LAB — HBA1C MFR BLD: 7.1 %

## 2022-03-14 PROCEDURE — 3017F COLORECTAL CA SCREEN DOC REV: CPT | Performed by: INTERNAL MEDICINE

## 2022-03-14 PROCEDURE — G8427 DOCREV CUR MEDS BY ELIG CLIN: HCPCS | Performed by: INTERNAL MEDICINE

## 2022-03-14 PROCEDURE — 1036F TOBACCO NON-USER: CPT | Performed by: INTERNAL MEDICINE

## 2022-03-14 PROCEDURE — 83036 HEMOGLOBIN GLYCOSYLATED A1C: CPT | Performed by: INTERNAL MEDICINE

## 2022-03-14 PROCEDURE — 4040F PNEUMOC VAC/ADMIN/RCVD: CPT | Performed by: INTERNAL MEDICINE

## 2022-03-14 PROCEDURE — G8400 PT W/DXA NO RESULTS DOC: HCPCS | Performed by: INTERNAL MEDICINE

## 2022-03-14 PROCEDURE — 1090F PRES/ABSN URINE INCON ASSESS: CPT | Performed by: INTERNAL MEDICINE

## 2022-03-14 PROCEDURE — 3046F HEMOGLOBIN A1C LEVEL >9.0%: CPT | Performed by: INTERNAL MEDICINE

## 2022-03-14 PROCEDURE — G8420 CALC BMI NORM PARAMETERS: HCPCS | Performed by: INTERNAL MEDICINE

## 2022-03-14 PROCEDURE — G8484 FLU IMMUNIZE NO ADMIN: HCPCS | Performed by: INTERNAL MEDICINE

## 2022-03-14 PROCEDURE — 1123F ACP DISCUSS/DSCN MKR DOCD: CPT | Performed by: INTERNAL MEDICINE

## 2022-03-14 PROCEDURE — 2022F DILAT RTA XM EVC RTNOPTHY: CPT | Performed by: INTERNAL MEDICINE

## 2022-03-14 PROCEDURE — 99214 OFFICE O/P EST MOD 30 MIN: CPT | Performed by: INTERNAL MEDICINE

## 2022-03-14 RX ORDER — METFORMIN HYDROCHLORIDE 500 MG/1
TABLET, EXTENDED RELEASE ORAL
Qty: 90 TABLET | Refills: 2 | Status: SHIPPED | OUTPATIENT
Start: 2022-03-14 | End: 2022-05-05 | Stop reason: SDUPTHER

## 2022-03-14 RX ORDER — BRIMONIDINE TARTRATE/TIMOLOL 0.2%-0.5%
DROPS OPHTHALMIC (EYE)
COMMUNITY
Start: 2022-01-18

## 2022-03-14 NOTE — PROGRESS NOTES
Seen as f/u      Interim:  Diarrhea with metformin occ    Needs switch back to lantus  Occ diarrhea with metformin- irregular    Diagnosed with Type 2 diabetes mellitus in 2006  Known diabetic complications: Neuropathy    Current diabetic medications   Metformin 500mg ER BID can not tolerate higher dose  Jardiance  25mg  Victoza  1.8  lantus 42    h/o use of sulfonylurea/ TZD      Last A1c 7.1%,-----7.4 %<-----7.1% <------  6.3%<------ 7.5%<------ 8.5%<------7.6%<------- 9%<------9.8 on 1/18 <--- 10.2 <--- 9.1    Prior visit with dietician: Yes   Current diet: on average, 3 meals per day   Current exercise: walking   Current monitoring regimen: home blood tests -1  times daily     Has brought blood glucose log/meter: yes  Home blood sugar records:  Any episodes of hypoglycemia? 58    No Hx of CAD , PVD, CVA    Hyperlipidemia: controlled, taking statin  LDL 36 on 1/18   crestor 40mg fenofibrate 160mg  LDL 42 on 4/19  LDL 57 on 7/20  LDL 64  Last eye exam: 2/22  Last foot exam: 1/21 by poiatrist  Last microalbumin to creatinine ratio: 1/21    HTN: Controlled, on  lisinopril 5mg    States hair loss for last 2 years    1/18 Testosterone < 3  DHEA-S 20  TSH 1.04    Calcium high in the past but normal since 2011 1/18 Ca 9.8  7/11  Ca 11.0  6/09 Ca 10.9        Past Medical History:   Diagnosis Date    Anesthesia     hx of reaction to medications can have \"opposite\" effect with sedatives and causes patient to be jittery, agitated. Patient has concern what will be given.      Arthritis     Diabetic eye exam ([facility] [place] [address] 08.24.15    Dr Kathleen De La Paz Diabetic eye exam [facility]) 01/11/2017    Diabetic eye exam ([address] 01/09/2019    Hyperlipidemia     Hypertension     Pneumonia 1996    Type II or unspecified type diabetes mellitus without mention of complication, not stated as uncontrolled      Past Surgical History:   Procedure Laterality Date    BREAST BIOPSY Left     benign excisional biopsy    CARPAL TUNNEL RELEASE bilateral    COLONOSCOPY  5/2013    HYSTERECTOMY      partial    MASTECTOMY, PARTIAL Left 07/18/2013    LEFT BREAST NEEDLE LOCALIZED PARTIAL MASTECTOMY EXCISIONAL    OTHER SURGICAL HISTORY      medications for sedation can make her jittery    TUBAL LIGATION       Current Outpatient Medications   Medication Sig Dispense Refill    JARDIANCE 25 MG tablet TAKE ONE TABLET BY MOUTH DAILY 90 tablet 2    LANTUS SOLOSTAR 100 UNIT/ML injection pen INJECT 41 UNITS SUBCUTANEOUSLY DAILY 45 mL 2    rosuvastatin (CRESTOR) 40 MG tablet TAKE ONE TABLET BY MOUTH EACH EVENING 90 tablet 1    VICTOZA 18 MG/3ML SOPN SC injection INJECT 1.8MG SUBCUTANEOUSLY DAILY 9 mL 11    fenofibrate (TRIGLIDE) 160 MG tablet TAKE ONE TABLET BY MOUTH DAILY 90 tablet 2    metFORMIN (GLUCOPHAGE-XR) 500 MG extended release tablet TAKE ONE TABLET BY MOUTH TWICE DAILY (TAKE WITH FOOD) 180 tablet 2    ADVOCATE INSULIN PEN NEEDLES 31G X 5 MM MISC USE WITH LANTUS SOLOSTAR PENS 100 each 3    lisinopril (PRINIVIL;ZESTRIL) 10 MG tablet TAKE ONE TABLET DAILY 90 tablet 1    blood glucose test strips (ONETOUCH VERIO) strip 1 each by In Vitro route daily As needed. 100 each 3    BL GLUCOSAMINE-CHONDROITIN PO Take by mouth      Biotin 34442 MCG TABS Take by mouth      timolol (TIMOPTIC) 0.25 % ophthalmic solution 1 drop 2 times daily      bimatoprost (LUMIGAN) 0.01 % SOLN ophthalmic drops Place 1 drop into both eyes nightly      Lancets MISC Use to check sugar once daily MAY SUBSTITUTE PER INSURANCE 100 each 3    Insulin Pen Needle 32G X 4 MM MISC 1 each by Does not apply route daily 100 each 3    melatonin (RA MELATONIN) 3 MG TABS tablet Take 10 mg by mouth nightly Indications: Pt is taking 2 tab (5 mg) daily. Total 10 mg daily. 3    Cholecalciferol (VITAMIN D3) 400 UNIT/ML LIQD Take 5,000 Int'l Units by mouth daily.  COMBIGAN 0.2-0.5 % ophthalmic solution        No current facility-administered medications for this visit.        Review of Systems  Please see scanned document dated and signed      Objective:      BP (!) 100/57   Pulse 71   Ht 5' 3.2\" (1.605 m)   Wt 135 lb (61.2 kg)   LMP  (LMP Unknown)   SpO2 98%   Breastfeeding No   BMI 23.76 kg/m²   Wt Readings from Last 3 Encounters:   03/14/22 135 lb (61.2 kg)   10/11/21 135 lb 3.2 oz (61.3 kg)   09/23/21 135 lb (61.2 kg)     Constitutional: Well-developed, appears stated age, cooperative, in no acute distress  H/E/N/M/T:atraumatic, normocephalic, external ears, nose, lips normal without lesions  Eyes: Lids, lashes, conjunctivae and sclerae normal, No proptosis, no redness  Neck: supple, symmetrical, no swelling  Skin: No obvious rashes or lesions present. Skin and hair texture normal  Psychiatric: Judgement and Insight:  judgement and insight appear normal  Neuro: Normal without focal findings, speech is normal normal, speech is spontaneous  Chest: No labored breathing, no chest deformity, no stridor  Musculoskeletal: No joint deformity, swelling      4/19  Skeletal foot exam is normal, no skin lesions, toenails are normal, pulses are normal, 10 g monofilament is reduced big toes    Lab Reviewed   No components found for: CHLPL  Lab Results   Component Value Date    TRIG 103 03/11/2022    TRIG 99 04/27/2021    TRIG 96 07/20/2020     Lab Results   Component Value Date    HDL 50 03/11/2022    HDL 38 (L) 04/27/2021    HDL 41 07/20/2020     Lab Results   Component Value Date    LDLCALC 64 03/11/2022    LDLCALC 55 04/27/2021    LDLCALC 57 07/20/2020     Lab Results   Component Value Date    LABVLDL 21 03/11/2022    LABVLDL 20 04/27/2021    LABVLDL 19 07/20/2020     Lab Results   Component Value Date    LABA1C 7.3 04/27/2021       Assessment:     Marsha Altman is a 78 y.o. female with :    1.T2DM: Longstanding, uncontrolled, provided education. Discussed with patient given hyperglycemia, A1c, duration of DM, recommended basal insulin. She was hesitant but agreed after discussion.  A1c slightly increased. Needs to check post prandial glucose. Will need meal time insulin, she would like to make diet changes  Advised 30 gm CHO with meal  Decreased metformin given GI issues  2. HTN : BP at goal  3. HLD: LDL at goal  4. Hair loss : DHEA-S, TSH nl. Testosterone low, discussed it is unlikely cause of her hair loss. Advised to see dermatology to rule out any other causes. 5.Hypercalcemia: Improved    Plan:        lantus 42 units daily, advised self titration    continue victoza   Continue metformin, change to once a day    May need prandial insulin, she would like to avoid   Advise to check blood sugar 1 -2 times a day   Patient to send blood sugar log for titration. Advise to exercise regularly. Advise to low simple carbohydrate and protein with each  meal diet. Diabetes Care: recommend yearly eye exam, foot exam and urine microalbumin to   creatinine ratio. Patient is up-to-date.         F/u in 6 months as will see PCP

## 2022-03-15 ENCOUNTER — OFFICE VISIT (OUTPATIENT)
Dept: PRIMARY CARE CLINIC | Age: 77
End: 2022-03-15
Payer: MEDICARE

## 2022-03-15 VITALS
WEIGHT: 134 LBS | SYSTOLIC BLOOD PRESSURE: 93 MMHG | HEART RATE: 74 BPM | DIASTOLIC BLOOD PRESSURE: 56 MMHG | BODY MASS INDEX: 23.59 KG/M2 | TEMPERATURE: 97.2 F

## 2022-03-15 DIAGNOSIS — Z79.4 CONTROLLED TYPE 2 DIABETES MELLITUS WITH MICROALBUMINURIA, WITH LONG-TERM CURRENT USE OF INSULIN (HCC): Primary | ICD-10-CM

## 2022-03-15 DIAGNOSIS — Z12.11 COLON CANCER SCREENING: ICD-10-CM

## 2022-03-15 DIAGNOSIS — I10 ESSENTIAL HYPERTENSION: ICD-10-CM

## 2022-03-15 DIAGNOSIS — E78.2 MIXED HYPERLIPIDEMIA: ICD-10-CM

## 2022-03-15 DIAGNOSIS — E11.29 CONTROLLED TYPE 2 DIABETES MELLITUS WITH MICROALBUMINURIA, WITH LONG-TERM CURRENT USE OF INSULIN (HCC): Primary | ICD-10-CM

## 2022-03-15 DIAGNOSIS — R80.9 CONTROLLED TYPE 2 DIABETES MELLITUS WITH MICROALBUMINURIA, WITH LONG-TERM CURRENT USE OF INSULIN (HCC): Primary | ICD-10-CM

## 2022-03-15 PROBLEM — L65.9 ALOPECIA: Status: RESOLVED | Noted: 2018-01-18 | Resolved: 2022-03-15

## 2022-03-15 PROBLEM — E34.9 HYPOTESTOSTERONEMIA: Status: RESOLVED | Noted: 2018-01-18 | Resolved: 2022-03-15

## 2022-03-15 PROCEDURE — G8420 CALC BMI NORM PARAMETERS: HCPCS | Performed by: FAMILY MEDICINE

## 2022-03-15 PROCEDURE — 1090F PRES/ABSN URINE INCON ASSESS: CPT | Performed by: FAMILY MEDICINE

## 2022-03-15 PROCEDURE — G8400 PT W/DXA NO RESULTS DOC: HCPCS | Performed by: FAMILY MEDICINE

## 2022-03-15 PROCEDURE — 3051F HG A1C>EQUAL 7.0%<8.0%: CPT | Performed by: FAMILY MEDICINE

## 2022-03-15 PROCEDURE — 4040F PNEUMOC VAC/ADMIN/RCVD: CPT | Performed by: FAMILY MEDICINE

## 2022-03-15 PROCEDURE — 99214 OFFICE O/P EST MOD 30 MIN: CPT | Performed by: FAMILY MEDICINE

## 2022-03-15 PROCEDURE — 3017F COLORECTAL CA SCREEN DOC REV: CPT | Performed by: FAMILY MEDICINE

## 2022-03-15 PROCEDURE — 1123F ACP DISCUSS/DSCN MKR DOCD: CPT | Performed by: FAMILY MEDICINE

## 2022-03-15 PROCEDURE — G8427 DOCREV CUR MEDS BY ELIG CLIN: HCPCS | Performed by: FAMILY MEDICINE

## 2022-03-15 PROCEDURE — G8484 FLU IMMUNIZE NO ADMIN: HCPCS | Performed by: FAMILY MEDICINE

## 2022-03-15 PROCEDURE — 2022F DILAT RTA XM EVC RTNOPTHY: CPT | Performed by: FAMILY MEDICINE

## 2022-03-15 PROCEDURE — 1036F TOBACCO NON-USER: CPT | Performed by: FAMILY MEDICINE

## 2022-03-15 NOTE — PROGRESS NOTES
[address] [facility] [address] PRIMARY CARE  [address] [facility] [address]  Dept: [phone]  Dept Fax: [phone]     3/15/2022      Lisa Monroe   1945     Chief Complaint   Patient presents with    Establish Care       HPI    Pt comes in today to establish care. Previously under the care of Dr. Analia Aquino. PMH significant for well controlled DMII, HTN, HLD. She follows with Dr. Lexii Millan. Just had visit 3/14/22. a1c = 7.1%. Here with  today. Follows with ophtho for h/o glaucoma. No acute concerns. Prior to Visit Medications    Medication Sig Taking? Authorizing Provider   COMBIGAN 0.2-0.5 % ophthalmic solution  Yes Historical Provider, MD   metFORMIN (GLUCOPHAGE-XR) 500 MG extended release tablet TAKE ONE TABLET BY MOUTH Yes Yessi Luz MD   JARDIANCE 25 MG tablet TAKE ONE TABLET BY MOUTH DAILY Yes Kyle Soler DO   LANTUS SOLOSTAR 100 UNIT/ML injection pen INJECT 41 UNITS SUBCUTANEOUSLY DAILY Yes Kyle Soler DO   rosuvastatin (CRESTOR) 40 MG tablet TAKE ONE TABLET BY MOUTH EACH EVENING Yes Kyle Soler DO   VICTOZA 18 MG/3ML SOPN SC injection INJECT 1.8MG SUBCUTANEOUSLY DAILY Yes Kyle Soler DO   fenofibrate (TRIGLIDE) 160 MG tablet TAKE ONE TABLET BY MOUTH DAILY Yes Kyle Soler DO   lisinopril (PRINIVIL;ZESTRIL) 10 MG tablet TAKE ONE TABLET DAILY Yes Kyle Soler DO   BL GLUCOSAMINE-CHONDROITIN PO Take by mouth Yes Historical Provider, MD   Biotin 40966 MCG TABS Take by mouth Yes Historical Provider, MD   timolol (TIMOPTIC) 0.25 % ophthalmic solution 1 drop 2 times daily Yes Historical Provider, MD   bimatoprost (LUMIGAN) 0.01 % SOLN ophthalmic drops Place 1 drop into both eyes nightly Yes Historical Provider, MD   melatonin (RA MELATONIN) 3 MG TABS tablet Take 10 mg by mouth nightly Indications: Pt is taking 2 tab (5 mg) daily. Total 10 mg daily.   Yes Karyna Matta, APRN - CNP Cholecalciferol (VITAMIN D3) 400 UNIT/ML LIQD Take 5,000 Int'l Units by mouth daily. Yes Historical Provider, MD   blood glucose test strips (ONETOUCH VERIO) strip 1 each by In Vitro route daily As needed. Patient not taking: Reported on 3/15/2022  Temo Rm MD   Blood Glucose Monitoring Suppl (TRUE METRIX METER) w/Device KIT 1 each by Does not apply route daily  Patient not taking: Reported on 2021  Kym Soler DO   Lancets MISC Use to check sugar once daily [address]  Patient not taking: Reported on 3/15/2022  Skyler Cota MD   Insulin Pen Needle 32G X 4 MM MISC 1 each by Does not apply route daily  Patient not taking: Reported on 3/15/2022  Temo Rm MD        Past Medical History:   Diagnosis Date    Anesthesia     hx of reaction to medications can have \"opposite\" effect with sedatives and causes patient to be jittery, agitated. Patient has concern what will be given.  Arthritis     Glaucoma     Hyperlipidemia     Hypertension     Type II or unspecified type diabetes mellitus without mention of complication, not stated as uncontrolled         Social History     Tobacco Use    Smoking status: Former Smoker     Packs/day: 0.25     Years: 51.00     Pack years: 12.75     Types: Cigarettes     Quit date: 3/9/2016     Years since quittin.0    Smokeless tobacco: Never Used    Tobacco comment: intermittent smoking over last 46 yrs. Paty Espinosa has a cigarette now. Quit routine smoking    Vaping Use    Vaping Use: Never used   Substance Use Topics    Alcohol use: Yes     Alcohol/week: 0.0 standard drinks     Comment: Occas.  x 2-3/ month    Drug use: No        Past Surgical History:   Procedure Laterality Date    BREAST BIOPSY Left     benign excisional biopsy    CARPAL TUNNEL RELEASE      bilateral    COLONOSCOPY  2013    HYSTERECTOMY      partial    MASTECTOMY, PARTIAL Left 2013    LEFT BREAST NEEDLE LOCALIZED PARTIAL MASTECTOMY EXCISIONAL    OTHER SURGICAL HISTORY      medications for sedation can make her jittery    TUBAL LIGATION          Allergies   Allergen Reactions    Benadryl [Diphenhydramine]     Erythromycin Nausea And Vomiting        Family History   Problem Relation Age of Onset    High Blood Pressure Mother     High Cholesterol Mother     Depression Mother     High Blood Pressure Brother     High Cholesterol Brother     Diabetes Maternal Grandmother         Patient's past medical history, surgical history, family history, medications, and allergies  were all reviewed and updated as appropriate today. Review of Systems   Constitutional: Negative for chills, fever and unexpected weight change. Respiratory: Negative for shortness of breath. Cardiovascular: Negative for chest pain. Gastrointestinal: Negative for abdominal pain, diarrhea, nausea and vomiting. BP (!) 93/56   Pulse 74   Temp 97.2 °F (36.2 °C)   Wt 134 lb (60.8 kg)   LMP  (LMP Unknown)   BMI 23.59 kg/m²      Physical Exam  Vitals reviewed. Constitutional:       General: She is not in acute distress. Appearance: Normal appearance. She is well-developed. Eyes:      General: No scleral icterus. Conjunctiva/sclera: Conjunctivae normal.   Neck:      Thyroid: No thyromegaly. Cardiovascular:      Rate and Rhythm: Normal rate and regular rhythm. Heart sounds: No murmur heard. Pulmonary:      Effort: Pulmonary effort is normal. No respiratory distress. Breath sounds: Normal breath sounds. Abdominal:      General: Bowel sounds are normal. There is no distension. Palpations: Abdomen is soft. Tenderness: There is no abdominal tenderness. Musculoskeletal:      Cervical back: Neck supple. Right lower leg: No edema. Left lower leg: No edema. Lymphadenopathy:      Cervical: No cervical adenopathy. Skin:     General: Skin is warm and dry.       Capillary Refill: Capillary refill takes less than 2 seconds. Neurological:      General: No focal deficit present. Mental Status: She is alert and oriented to person, place, and time. Mental status is at baseline. Psychiatric:         Mood and Affect: Mood normal.         Assessment:  Encounter Diagnoses   Name Primary?  Controlled type 2 diabetes mellitus with microalbuminuria, with long-term current use of insulin (HCC) Yes    Mixed hyperlipidemia     Essential hypertension     Colon cancer screening        Plan:    - Chronic conditions stable. Continue follow up with Endo. Will plan for fasting labs at [address] [place] [address].   - Colonoscopy due. Referral placed. New Prescriptions    No medications on file        Orders Placed This Encounter   Procedures   Jazmín Palomo MD, Gastroenterology, [facility]        Return in about 7 months (around 10/12/2022). 30 total minutes were spent in direct patient care including chart review, face-to-face consultation and documentation.      Rolando Harper, DO

## 2022-03-17 ASSESSMENT — ENCOUNTER SYMPTOMS
DIARRHEA: 0
VOMITING: 0
ABDOMINAL PAIN: 0
SHORTNESS OF BREATH: 0
NAUSEA: 0

## 2022-04-04 ENCOUNTER — OFFICE VISIT (OUTPATIENT)
Dept: PRIMARY CARE CLINIC | Age: 77
End: 2022-04-04
Payer: MEDICARE

## 2022-04-04 VITALS
TEMPERATURE: 97 F | WEIGHT: 131.6 LBS | HEART RATE: 71 BPM | OXYGEN SATURATION: 95 % | DIASTOLIC BLOOD PRESSURE: 67 MMHG | SYSTOLIC BLOOD PRESSURE: 106 MMHG | BODY MASS INDEX: 23.32 KG/M2 | HEIGHT: 63 IN

## 2022-04-04 DIAGNOSIS — I10 ESSENTIAL HYPERTENSION: ICD-10-CM

## 2022-04-04 DIAGNOSIS — E11.29 CONTROLLED TYPE 2 DIABETES MELLITUS WITH MICROALBUMINURIA, WITH LONG-TERM CURRENT USE OF INSULIN (HCC): ICD-10-CM

## 2022-04-04 DIAGNOSIS — H40.9 GLAUCOMA OF LEFT EYE, UNSPECIFIED GLAUCOMA TYPE: Primary | ICD-10-CM

## 2022-04-04 DIAGNOSIS — Z79.4 CONTROLLED TYPE 2 DIABETES MELLITUS WITH MICROALBUMINURIA, WITH LONG-TERM CURRENT USE OF INSULIN (HCC): ICD-10-CM

## 2022-04-04 DIAGNOSIS — Z01.818 PRE-OP EXAM: ICD-10-CM

## 2022-04-04 DIAGNOSIS — R80.9 CONTROLLED TYPE 2 DIABETES MELLITUS WITH MICROALBUMINURIA, WITH LONG-TERM CURRENT USE OF INSULIN (HCC): ICD-10-CM

## 2022-04-04 PROCEDURE — 1123F ACP DISCUSS/DSCN MKR DOCD: CPT | Performed by: FAMILY MEDICINE

## 2022-04-04 PROCEDURE — 1036F TOBACCO NON-USER: CPT | Performed by: FAMILY MEDICINE

## 2022-04-04 PROCEDURE — G8420 CALC BMI NORM PARAMETERS: HCPCS | Performed by: FAMILY MEDICINE

## 2022-04-04 PROCEDURE — 4040F PNEUMOC VAC/ADMIN/RCVD: CPT | Performed by: FAMILY MEDICINE

## 2022-04-04 PROCEDURE — G8400 PT W/DXA NO RESULTS DOC: HCPCS | Performed by: FAMILY MEDICINE

## 2022-04-04 PROCEDURE — 3017F COLORECTAL CA SCREEN DOC REV: CPT | Performed by: FAMILY MEDICINE

## 2022-04-04 PROCEDURE — 2022F DILAT RTA XM EVC RTNOPTHY: CPT | Performed by: FAMILY MEDICINE

## 2022-04-04 PROCEDURE — 3051F HG A1C>EQUAL 7.0%<8.0%: CPT | Performed by: FAMILY MEDICINE

## 2022-04-04 PROCEDURE — 1090F PRES/ABSN URINE INCON ASSESS: CPT | Performed by: FAMILY MEDICINE

## 2022-04-04 PROCEDURE — G8427 DOCREV CUR MEDS BY ELIG CLIN: HCPCS | Performed by: FAMILY MEDICINE

## 2022-04-04 PROCEDURE — 99213 OFFICE O/P EST LOW 20 MIN: CPT | Performed by: FAMILY MEDICINE

## 2022-04-04 SDOH — ECONOMIC STABILITY: FOOD INSECURITY: WITHIN THE PAST 12 MONTHS, THE FOOD YOU BOUGHT JUST DIDN'T LAST AND YOU DIDN'T HAVE MONEY TO GET MORE.: NEVER TRUE

## 2022-04-04 SDOH — ECONOMIC STABILITY: FOOD INSECURITY: WITHIN THE PAST 12 MONTHS, YOU WORRIED THAT YOUR FOOD WOULD RUN OUT BEFORE YOU GOT MONEY TO BUY MORE.: NEVER TRUE

## 2022-04-04 ASSESSMENT — ENCOUNTER SYMPTOMS
VOMITING: 0
NAUSEA: 0
ABDOMINAL PAIN: 0
SHORTNESS OF BREATH: 0
DIARRHEA: 0

## 2022-04-04 ASSESSMENT — SOCIAL DETERMINANTS OF HEALTH (SDOH): HOW HARD IS IT FOR YOU TO PAY FOR THE VERY BASICS LIKE FOOD, HOUSING, MEDICAL CARE, AND HEATING?: NOT HARD AT ALL

## 2022-04-04 NOTE — PROGRESS NOTES
[address] PRIMARY CARE  [address] [facility] [address]  Dept: [phone]  Dept Fax: [phone]     4/4/2022      Preet Lindsay   1945     Chief Complaint   Patient presents with    Pre-op Exam     left eye surgery, Tia Claros, 4.14.22       HPI  Pt comes in today for pre-op exam.    Diagnosis: Glaucoma    Planned surgery: Glaucoma related surgery (not indicated on her paperwork)    Date scheduled: 4/14/22    Anesthesia: Sub Tenon's/MAC. Blood thinner/ASA/NSAIDs: None    H/o anesthesia complications: None    Pre-operative testing: None indicated. H/o cardiac or pulmonary disease: None. Functional capacity: 5-7 METS  ( <4 MET = Self care, ADLs, walking indoors, 3-5 MET = Light work around the house, climb stairs or walk up hill, 5-7 METs = Heavy cleaning around the house, moderate yard work, run a short distance, 8-10 METs = Moderate to strenuous recreational sports, daily or prolonged exercise)        Prior to Visit Medications    Medication Sig Taking?  Authorizing Provider   Glucosamine HCl (GLUCOSAMINE PO) Take by mouth Yes Historical Provider, MD   COMBIGAN 0.2-0.5 % ophthalmic solution  Yes Historical Provider, MD   metFORMIN (GLUCOPHAGE-XR) 500 MG extended release tablet TAKE ONE TABLET BY MOUTH Yes Jane Bhatti MD   JARDIANCE 25 MG tablet TAKE ONE TABLET BY MOUTH DAILY Yes Narda Means Bingcang, DO   LANTUS SOLOSTAR 100 UNIT/ML injection pen INJECT 41 UNITS SUBCUTANEOUSLY DAILY Yes Narda Means Bingcang, DO   rosuvastatin (CRESTOR) 40 MG tablet TAKE ONE TABLET BY MOUTH EACH EVENING Yes Narda Means Kashifgcang, DO   VICTOZA 18 MG/3ML SOPN SC injection INJECT 1.8MG SUBCUTANEOUSLY DAILY Yes Narda Means Bingcang, DO   fenofibrate (TRIGLIDE) 160 MG tablet TAKE ONE TABLET BY MOUTH DAILY Yes Narda Means Bingcang, DO   lisinopril (PRINIVIL;ZESTRIL) 10 MG tablet TAKE ONE TABLET DAILY Yes Narda Means Kashifgcang, DO   Biotin 18429 MCG TABS Take by mouth Yes Historical Provider, MD   timolol (TIMOPTIC) 0.25 % ophthalmic solution 1 drop 2 times daily Yes Historical Provider, MD   bimatoprost (LUMIGAN) 0.01 % SOLN ophthalmic drops Place 1 drop into both eyes nightly Yes Historical Provider, MD   Lancets MISC Use to check sugar once daily MAY SUBSTITUTE PER INSURANCE Yes Zane Baca MD   Insulin Pen Needle 32G X 4 MM MISC 1 each by Does not apply route daily Yes Alexandro Lentz MD   melatonin (RA MELATONIN) 3 MG TABS tablet Take 10 mg by mouth nightly Indications: Pt is taking 2 tab (5 mg) daily. Total 10 mg daily. Yes MECHE Bruno - CNP   Cholecalciferol (VITAMIN D3) 400 UNIT/ML LIQD Take 5,000 Int'l Units by mouth daily. Yes Historical Provider, MD   blood glucose test strips (ONETOUCH VERIO) strip 1 each by In Vitro route daily As needed. Patient not taking: Reported on 3/15/2022  Alexandro Lentz MD   Blood Glucose Monitoring Suppl (TRUE METRIX METER) w/Device KIT 1 each by Does not apply route daily  Patient not taking: Reported on 2021  DO BRIELLE Hyde GLUCOSAMINE-CHONDROITIN PO Take by mouth  Historical Provider, MD        Past Medical History:   Diagnosis Date    Anesthesia     hx of reaction to medications can have \"opposite\" effect with sedatives and causes patient to be jittery, agitated. Patient has concern what will be given.  Arthritis     Glaucoma     Hyperlipidemia     Hypertension     Type II or unspecified type diabetes mellitus without mention of complication, not stated as uncontrolled         Social History     Tobacco Use    Smoking status: Former Smoker     Packs/day: 0.25     Years: 51.00     Pack years: 12.75     Types: Cigarettes     Quit date: 3/9/2016     Years since quittin.0    Smokeless tobacco: Never Used    Tobacco comment: intermittent smoking over last 46 yrs. Mahamed Irizarry has a cigarette now.  Quit routine smoking    Vaping Use    Vaping Use: Never used Substance Use Topics    Alcohol use: Yes     Alcohol/week: 0.0 standard drinks     Comment: Occas. x 2-3/ month    Drug use: No        Past Surgical History:   Procedure Laterality Date    BREAST BIOPSY Left     benign excisional biopsy    CARPAL TUNNEL RELEASE      bilateral    COLONOSCOPY  5/2013    HYSTERECTOMY      partial    MASTECTOMY, PARTIAL Left 07/18/2013    LEFT BREAST NEEDLE LOCALIZED PARTIAL MASTECTOMY EXCISIONAL    OTHER SURGICAL HISTORY      medications for sedation can make her jittery    TUBAL LIGATION          Allergies   Allergen Reactions    Benadryl [Diphenhydramine]     Erythromycin Nausea And Vomiting        Family History   Problem Relation Age of Onset    High Blood Pressure Mother     High Cholesterol Mother     Depression Mother     High Blood Pressure Brother     High Cholesterol Brother     Diabetes Maternal Grandmother         Patient's past medical history, surgical history, family history, medications, and allergies  were all reviewed and updated as appropriate today. Review of Systems   Constitutional: Negative for chills, fever and unexpected weight change. Respiratory: Negative for shortness of breath. Cardiovascular: Negative for chest pain. Gastrointestinal: Negative for abdominal pain, diarrhea, nausea and vomiting. /67 (Site: Left Upper Arm, Position: Sitting, Cuff Size: Medium Adult)   Pulse 71   Temp 97 °F (36.1 °C)   Ht 5' 3\" (1.6 m)   Wt 131 lb 9.6 oz (59.7 kg)   LMP  (LMP Unknown)   SpO2 95%   BMI 23.31 kg/m²      Physical Exam  Vitals reviewed. Constitutional:       General: She is not in acute distress. Appearance: Normal appearance. She is well-developed. Eyes:      General: No scleral icterus. Neck:      Thyroid: No thyromegaly. Cardiovascular:      Rate and Rhythm: Normal rate and regular rhythm. Heart sounds: No murmur heard.       Pulmonary:      Effort: Pulmonary effort is normal. No respiratory distress. Breath sounds: Normal breath sounds. Abdominal:      General: Bowel sounds are normal. There is no distension. Palpations: Abdomen is soft. Tenderness: There is no abdominal tenderness. Musculoskeletal:      Cervical back: Neck supple. Right lower leg: No edema. Left lower leg: No edema. Lymphadenopathy:      Cervical: No cervical adenopathy. Skin:     General: Skin is warm and dry. Capillary Refill: Capillary refill takes less than 2 seconds. Neurological:      General: No focal deficit present. Mental Status: She is alert and oriented to person, place, and time. Mental status is at baseline. Psychiatric:         Mood and Affect: Mood normal.         Assessment:  Encounter Diagnoses   Name Primary?  Glaucoma of left eye, unspecified glaucoma type Yes    Pre-op exam     Essential hypertension     Controlled type 2 diabetes mellitus with microalbuminuria, with long-term current use of insulin ([facility] [address]        Plan:    - Low risk to proceed with planned procedure. She is not on blood thinners. No pre-operative testing indicated. - Chronic conditions stable. New Prescriptions    No medications on file        No orders of the defined types were placed in this encounter. Return if symptoms worsen or fail to improve.           [address], DO

## 2022-04-25 RX ORDER — BLOOD SUGAR DIAGNOSTIC
1 STRIP MISCELLANEOUS DAILY
Qty: 100 STRIP | Refills: 2 | Status: SHIPPED | OUTPATIENT
Start: 2022-04-25

## 2022-05-04 DIAGNOSIS — E78.2 MIXED HYPERLIPIDEMIA: ICD-10-CM

## 2022-05-05 ENCOUNTER — TELEPHONE (OUTPATIENT)
Dept: ENDOCRINOLOGY | Age: 77
End: 2022-05-05

## 2022-05-05 RX ORDER — LISINOPRIL 10 MG/1
TABLET ORAL
Qty: 90 TABLET | Refills: 2 | Status: SHIPPED | OUTPATIENT
Start: 2022-05-05

## 2022-05-05 RX ORDER — BLOOD SUGAR DIAGNOSTIC
STRIP MISCELLANEOUS
Qty: 100 EACH | Refills: 3 | Status: SHIPPED | OUTPATIENT
Start: 2022-05-05

## 2022-05-05 RX ORDER — ROSUVASTATIN CALCIUM 40 MG/1
TABLET, COATED ORAL
Qty: 90 TABLET | Refills: 2 | Status: SHIPPED | OUTPATIENT
Start: 2022-05-05

## 2022-05-05 RX ORDER — FENOFIBRATE 160 MG/1
TABLET ORAL
Qty: 90 TABLET | Refills: 2 | Status: SHIPPED | OUTPATIENT
Start: 2022-05-05

## 2022-05-05 RX ORDER — METFORMIN HYDROCHLORIDE 500 MG/1
TABLET, EXTENDED RELEASE ORAL
Qty: 90 TABLET | Refills: 2 | Status: SHIPPED | OUTPATIENT
Start: 2022-05-05

## 2022-05-05 NOTE — TELEPHONE ENCOUNTER
Pharmacy would like new script sent over for metFORMIN (GLUCOPHAGE-XR) 500 MG extended release tablet; patient says she was taking two tablets a day per different provider previously.             [facility], [facility] [phone]   [address] [place] [address]., [address] [place] [address]   Phone:  [phone]  Fax:  [phone]

## 2022-05-05 NOTE — TELEPHONE ENCOUNTER
Medication:   Requested Prescriptions     Pending Prescriptions Disp Refills    metFORMIN (GLUCOPHAGE-XR) 500 MG extended release tablet 90 tablet 2     Sig: TAKE ONE TABLET BY MOUTH       Last Filled:      Patient Phone Number: [phone] (home)     Last appt: 3/14/2022   Next appt: 9/14/2022    Last Labs DM:   Lab Results   Component Value Date    LABA1C 7.1 03/14/2022

## 2022-06-08 ENCOUNTER — TELEPHONE (OUTPATIENT)
Dept: PRIMARY CARE CLINIC | Age: 77
End: 2022-06-08

## 2022-06-08 NOTE — TELEPHONE ENCOUNTER
----- Message from Dorota Smith sent at 6/8/2022  2:25 PM EDT -----  Subject: Message to Provider    QUESTIONS  Information for Provider? Pt was asking for a recommendation for a   dermatologist within her network (managed Medicare) as her old one has   left to a practice that is no longer in it. Pt is just wondering if Dr. Natalia Weiner had a recommendation for a dermatologist. Please call pt back with   a recommendation for a dermatologist or to see if it is something that Dr. Natalia Weiner can treat her for, should it even need treatment.   ---------------------------------------------------------------------------  --------------  [address]  What is the best way for the office to contact you? OK to leave message on   voicemail, OK to respond with electronic message via [employer] portal (only   for patients who have registered [employer] account)  Preferred Call Back Phone Number? [phone]  ---------------------------------------------------------------------------  --------------  SCRIPT ANSWERS  Relationship to Patient?  Self

## 2022-06-08 NOTE — TELEPHONE ENCOUNTER
Left message for patient to call back. Need to know what condition she has to see if Dr Edmundo Boothe can treat it. She will need to call insurance co to find derms in her network.

## 2022-06-13 NOTE — TELEPHONE ENCOUNTER
Yearly skin check, has red spots on legs and get a crusty scab on them and they itch. Is this ok to schedule here for? Otherwise, patient would like a recommendation of who Provider feels is a good Dermatologist not with [facility]. She will then take the names and call Ins.       Pt phone [phone]

## 2022-06-13 NOTE — TELEPHONE ENCOUNTER
Spoke with pt let her know she could reach out the dermatology group in [place] and gave the number to contact them.

## 2022-08-09 RX ORDER — LIRAGLUTIDE 6 MG/ML
INJECTION SUBCUTANEOUS
Qty: 9 ML | Refills: 11 | Status: SHIPPED | OUTPATIENT
Start: 2022-08-09

## 2022-08-09 RX ORDER — INSULIN GLARGINE 100 [IU]/ML
INJECTION, SOLUTION SUBCUTANEOUS
Qty: 15 ML | Refills: 11 | Status: SHIPPED | OUTPATIENT
Start: 2022-08-09

## 2022-09-14 ENCOUNTER — OFFICE VISIT (OUTPATIENT)
Dept: ENDOCRINOLOGY | Age: 77
End: 2022-09-14
Payer: MEDICARE

## 2022-09-14 VITALS
HEIGHT: 63 IN | BODY MASS INDEX: 23.42 KG/M2 | DIASTOLIC BLOOD PRESSURE: 67 MMHG | HEART RATE: 82 BPM | SYSTOLIC BLOOD PRESSURE: 96 MMHG | OXYGEN SATURATION: 99 % | WEIGHT: 132.2 LBS

## 2022-09-14 DIAGNOSIS — E11.65 UNCONTROLLED TYPE 2 DIABETES MELLITUS WITH HYPERGLYCEMIA (HCC): Primary | ICD-10-CM

## 2022-09-14 PROCEDURE — 3017F COLORECTAL CA SCREEN DOC REV: CPT | Performed by: INTERNAL MEDICINE

## 2022-09-14 PROCEDURE — G8420 CALC BMI NORM PARAMETERS: HCPCS | Performed by: INTERNAL MEDICINE

## 2022-09-14 PROCEDURE — 1036F TOBACCO NON-USER: CPT | Performed by: INTERNAL MEDICINE

## 2022-09-14 PROCEDURE — G8400 PT W/DXA NO RESULTS DOC: HCPCS | Performed by: INTERNAL MEDICINE

## 2022-09-14 PROCEDURE — 1090F PRES/ABSN URINE INCON ASSESS: CPT | Performed by: INTERNAL MEDICINE

## 2022-09-14 PROCEDURE — 83036 HEMOGLOBIN GLYCOSYLATED A1C: CPT | Performed by: INTERNAL MEDICINE

## 2022-09-14 PROCEDURE — G8427 DOCREV CUR MEDS BY ELIG CLIN: HCPCS | Performed by: INTERNAL MEDICINE

## 2022-09-14 PROCEDURE — 1123F ACP DISCUSS/DSCN MKR DOCD: CPT | Performed by: INTERNAL MEDICINE

## 2022-09-14 PROCEDURE — 2022F DILAT RTA XM EVC RTNOPTHY: CPT | Performed by: INTERNAL MEDICINE

## 2022-09-14 PROCEDURE — 3051F HG A1C>EQUAL 7.0%<8.0%: CPT | Performed by: INTERNAL MEDICINE

## 2022-09-14 PROCEDURE — 99214 OFFICE O/P EST MOD 30 MIN: CPT | Performed by: INTERNAL MEDICINE

## 2022-09-14 NOTE — PROGRESS NOTES
Seen as f/u      Interim:    Fair control  Decreasing metformin helped diarrhea  +stress  Does eat more CHO during stress   dementia    Diagnosed with Type 2 diabetes mellitus in 2006  Known diabetic complications: Neuropathy    Current diabetic medications   Metformin 500mg ER daily  Jardiance  25mg  Victoza  1.8  lantus 42    h/o use of sulfonylurea/ TZD      Last A1c 7.8%<------ 7.1%,-----7.4 %<-----7.1% <------  6.3%<------ 7.5%<------ 8.5%<------7.6%<------- 9%<------9.8 on 1/18 <--- 10.2 <--- 9.1    Prior visit with dietician: Yes   Current diet: on average, 3 meals per day   Current exercise: walking   Current monitoring regimen: home blood tests -1  times daily     Has brought blood glucose log/meter: yes  Home blood sugar records:  Any episodes of hypoglycemia? 60    No Hx of CAD , PVD, CVA    Hyperlipidemia: controlled, taking statin  LDL 36 on 1/18   crestor 40mg fenofibrate 160mg  LDL 42 on 4/19  LDL 57 on 7/20  LDL 64  Last eye exam: 2/22  Last foot exam: 1/21 by poiatrist  Last microalbumin to creatinine ratio: 1/21    HTN: Controlled, on  lisinopril 5mg    States hair loss for last 2 years    1/18 Testosterone < 3  DHEA-S 20  TSH 1.04    Calcium high in the past but normal since 2011 1/18 Ca 9.8  7/11  Ca 11.0  6/09 Ca 10.9        Past Medical History:   Diagnosis Date    Anesthesia     hx of reaction to medications can have \"opposite\" effect with sedatives and causes patient to be jittery, agitated. Patient has concern what will be given.      Arthritis     Glaucoma     Hyperlipidemia     Hypertension     Type II or unspecified type diabetes mellitus without mention of complication, not stated as uncontrolled      Past Surgical History:   Procedure Laterality Date    BREAST BIOPSY Left     benign excisional biopsy    CARPAL TUNNEL RELEASE      bilateral    COLONOSCOPY  5/2013    HYSTERECTOMY      partial    MASTECTOMY, PARTIAL Left 07/18/2013    LEFT BREAST NEEDLE LOCALIZED PARTIAL MASTECTOMY EXCISIONAL    OTHER SURGICAL HISTORY      medications for sedation can make her jittery    TUBAL LIGATION       Current Outpatient Medications   Medication Sig Dispense Refill    VICTOZA 18 MG/3ML SOPN SC injection INJECT 1.8MG SUBCUTANEOUSLY DAILY 9 mL 11    LANTUS SOLOSTAR 100 UNIT/ML injection pen INJECT 41 UNITS SUBCUTANEOUSLY DAILY 15 mL 11    ADVOCATE INSULIN PEN NEEDLES 31G X 5 MM MISC USE WITH LANTUS SOLOSTAR PENS 100 each 3    lisinopril (PRINIVIL;ZESTRIL) 10 MG tablet TAKE ONE TABLET DAILY 90 tablet 2    rosuvastatin (CRESTOR) 40 MG tablet TAKE ONE TABLET BY MOUTH EACH EVENING 90 tablet 2    fenofibrate (TRIGLIDE) 160 MG tablet TAKE ONE TABLET BY MOUTH DAILY 90 tablet 2    metFORMIN (GLUCOPHAGE-XR) 500 MG extended release tablet TAKE ONE TABLET BY MOUTH 90 tablet 2    ONETOUCH VERIO strip 1 EACH BY IN VITRO ROUTE DAILY AS NEEDED. 100 strip 2    Glucosamine HCl (GLUCOSAMINE PO) Take by mouth      COMBIGAN 0.2-0.5 % ophthalmic solution       JARDIANCE 25 MG tablet TAKE ONE TABLET BY MOUTH DAILY 90 tablet 2    Biotin 97103 MCG TABS Take by mouth      timolol (TIMOPTIC) 0.25 % ophthalmic solution 1 drop 2 times daily      bimatoprost (LUMIGAN) 0.01 % SOLN ophthalmic drops Place 1 drop into both eyes nightly      Lancets MISC Use to check sugar once daily MAY SUBSTITUTE PER INSURANCE 100 each 3    Insulin Pen Needle 32G X 4 MM MISC 1 each by Does not apply route daily 100 each 3    melatonin 3 MG TABS tablet Take 10 mg by mouth nightly Indications: Pt is taking 2 tab (5 mg) daily. Total 10 mg daily. 3    Cholecalciferol (VITAMIN D3) 400 UNIT/ML LIQD Take 5,000 Int'l Units by mouth daily. No current facility-administered medications for this visit.        Review of Systems  Please see scanned document dated and signed      Objective:      BP 96/67   Pulse 82   Ht 5' 3\" (1.6 m)   Wt 132 lb 3.2 oz (60 kg)   LMP  (LMP Unknown)   SpO2 99%   BMI 23.42 kg/m²   Wt Readings from Last 3 Encounters:   09/14/22 132 lb 3.2 oz (60 kg)   04/04/22 131 lb 9.6 oz (59.7 kg)   03/15/22 134 lb (60.8 kg)     Constitutional: Well-developed, appears stated age, cooperative, in no acute distress  H/E/N/M/T:atraumatic, normocephalic, external ears, nose, lips normal without lesions  Eyes: Lids, lashes, conjunctivae and sclerae normal, No proptosis, no redness  Neck: supple, symmetrical, no swelling  Skin: No obvious rashes or lesions present. Skin and hair texture normal  Psychiatric: Judgement and Insight:  judgement and insight appear normal  Neuro: Normal without focal findings, speech is normal normal, speech is spontaneous  Chest: No labored breathing, no chest deformity, no stridor  Musculoskeletal: No joint deformity, swelling      4/19  Skeletal foot exam is normal, no skin lesions, toenails are normal, pulses are normal, 10 g monofilament is reduced big toes    Lab Reviewed   No components found for: CHLPL  Lab Results   Component Value Date    TRIG 103 03/11/2022    TRIG 99 04/27/2021    TRIG 96 07/20/2020     Lab Results   Component Value Date    HDL 50 03/11/2022    HDL 38 (L) 04/27/2021    HDL 41 07/20/2020     Lab Results   Component Value Date    LDLCALC 64 03/11/2022    LDLCALC 55 04/27/2021    LDLCALC 57 07/20/2020     Lab Results   Component Value Date    LABVLDL 21 03/11/2022    LABVLDL 20 04/27/2021    LABVLDL 19 07/20/2020     Lab Results   Component Value Date    LABA1C 7.1 03/14/2022       Assessment:     Abhishek Ambrosio is a 78 y.o. female with :    1.T2DM: Longstanding, uncontrolled, provided education. Discussed with patient given hyperglycemia, A1c, duration of DM, recommended basal insulin. She was hesitant but agreed after discussion. A1c slightly increased. Needs to check post prandial glucose. May need meal time insulin, she would like to make diet changes  Advised 30 gm CHO with meal  Decreased metformin given GI issues  2. HTN : BP at goal  3. HLD: LDL at goal  4. Hair loss : DHEA-S, TSH nl. Testosterone low, discussed it is unlikely cause of her hair loss. Advised to see dermatology to rule out any other causes. 5.Hypercalcemia: Improved    Plan:        lantus 42 units daily, advised self titration    continue victoza   Continue metformin, change to once a day    May need prandial insulin, she would like to avoid   Advise to check blood sugar 1 -2 times a day   Patient to send blood sugar log for titration. Advise to exercise regularly. Advise to low simple carbohydrate and protein with each  meal diet. Diabetes Care: recommend yearly eye exam, foot exam and urine microalbumin to   creatinine ratio. Patient is up-to-date.         F/u in 6 months as will see PCP

## 2022-09-19 ENCOUNTER — TELEPHONE (OUTPATIENT)
Dept: PRIMARY CARE CLINIC | Age: 77
End: 2022-09-19

## 2022-09-19 NOTE — TELEPHONE ENCOUNTER
Pt's wife calling saying pt cannot seem to shake whatever it is he had when he did video with Deronda Sandhoff    She says he complains mostly of cough which happens mostly at night and he has trouble sleeping because of it. She says he has no fever, no head congestion, but is very fatigued. She wants to know what to do for him. He is willing to come in or do another video if needed.

## 2022-09-26 ENCOUNTER — TELEPHONE (OUTPATIENT)
Dept: PRIMARY CARE CLINIC | Age: 77
End: 2022-09-26

## 2022-09-26 NOTE — TELEPHONE ENCOUNTER
Pt calling to get 's thoughts on getting the flu shot and covid boosters.   She has already had 4 covid boosters

## 2022-09-26 NOTE — TELEPHONE ENCOUNTER
Spoke with patient to get more information  Patients last covid booster was 4/28/2022. Patient plans on receiving the newest version of covid booster. Patient was informed that it was recommended to wait two weeks in between the flu vaccine and the covid vaccine.     Patient did want to know which vaccine physician recommended to receive first?

## 2022-09-27 NOTE — TELEPHONE ENCOUNTER
Patient is not due for COVID booster until next month. I would therefore recommend she get the flu shot first, then the COVID booster when due.

## 2022-10-28 DIAGNOSIS — Z79.4 INSULIN DEPENDENT TYPE 2 DIABETES MELLITUS (HCC): ICD-10-CM

## 2022-10-28 DIAGNOSIS — E11.9 INSULIN DEPENDENT TYPE 2 DIABETES MELLITUS (HCC): ICD-10-CM

## 2022-10-28 RX ORDER — EMPAGLIFLOZIN 25 MG/1
TABLET, FILM COATED ORAL
Qty: 30 TABLET | Refills: 0 | OUTPATIENT
Start: 2022-10-28

## 2022-11-08 DIAGNOSIS — Z79.4 INSULIN DEPENDENT TYPE 2 DIABETES MELLITUS (HCC): ICD-10-CM

## 2022-11-08 DIAGNOSIS — E11.9 INSULIN DEPENDENT TYPE 2 DIABETES MELLITUS (HCC): ICD-10-CM

## 2022-11-08 RX ORDER — BLOOD SUGAR DIAGNOSTIC
STRIP MISCELLANEOUS
Qty: 100 EACH | Refills: 3 | Status: SHIPPED | OUTPATIENT
Start: 2022-11-08

## 2022-11-09 RX ORDER — EMPAGLIFLOZIN 25 MG/1
TABLET, FILM COATED ORAL
Qty: 90 TABLET | Refills: 1 | Status: SHIPPED | OUTPATIENT
Start: 2022-11-09

## 2022-11-15 ENCOUNTER — TELEPHONE (OUTPATIENT)
Dept: ENDOCRINOLOGY | Age: 77
End: 2022-11-15

## 2022-11-15 NOTE — TELEPHONE ENCOUNTER
Patient is calling because her bs numbers are running high. Fasting 73 83 83 (last week) [phone] (weekend)    She is calling for advice to see if she should increase her insulin dose. She is using 42 units once a day and 1.8 victoza.

## 2022-11-15 NOTE — TELEPHONE ENCOUNTER
Spoke to patient and she reports all the numbers listed below were fasting AM readings, none were during the day. She did take the 1:40 appt tomorrow to discuss further.

## 2022-11-15 NOTE — TELEPHONE ENCOUNTER
Her fasting are good. She can continue same dose of insulin. We may have to add a medication or another insulin for high glucose during the day. If she would like, can see her tomorrow at 1:40, can do A1c in office.

## 2022-11-16 ENCOUNTER — OFFICE VISIT (OUTPATIENT)
Dept: ENDOCRINOLOGY | Age: 77
End: 2022-11-16
Payer: MEDICARE

## 2022-11-16 VITALS
DIASTOLIC BLOOD PRESSURE: 60 MMHG | HEART RATE: 68 BPM | HEIGHT: 63 IN | TEMPERATURE: 98 F | RESPIRATION RATE: 14 BRPM | BODY MASS INDEX: 23.39 KG/M2 | SYSTOLIC BLOOD PRESSURE: 115 MMHG | WEIGHT: 132 LBS

## 2022-11-16 DIAGNOSIS — E11.65 UNCONTROLLED TYPE 2 DIABETES MELLITUS WITH HYPERGLYCEMIA (HCC): Primary | ICD-10-CM

## 2022-11-16 LAB — HBA1C MFR BLD: 7.3 %

## 2022-11-16 PROCEDURE — 2022F DILAT RTA XM EVC RTNOPTHY: CPT | Performed by: INTERNAL MEDICINE

## 2022-11-16 PROCEDURE — G8484 FLU IMMUNIZE NO ADMIN: HCPCS | Performed by: INTERNAL MEDICINE

## 2022-11-16 PROCEDURE — G8400 PT W/DXA NO RESULTS DOC: HCPCS | Performed by: INTERNAL MEDICINE

## 2022-11-16 PROCEDURE — 3074F SYST BP LT 130 MM HG: CPT | Performed by: INTERNAL MEDICINE

## 2022-11-16 PROCEDURE — 3078F DIAST BP <80 MM HG: CPT | Performed by: INTERNAL MEDICINE

## 2022-11-16 PROCEDURE — 99214 OFFICE O/P EST MOD 30 MIN: CPT | Performed by: INTERNAL MEDICINE

## 2022-11-16 PROCEDURE — 1090F PRES/ABSN URINE INCON ASSESS: CPT | Performed by: INTERNAL MEDICINE

## 2022-11-16 PROCEDURE — 3051F HG A1C>EQUAL 7.0%<8.0%: CPT | Performed by: INTERNAL MEDICINE

## 2022-11-16 PROCEDURE — 1036F TOBACCO NON-USER: CPT | Performed by: INTERNAL MEDICINE

## 2022-11-16 PROCEDURE — 83036 HEMOGLOBIN GLYCOSYLATED A1C: CPT | Performed by: INTERNAL MEDICINE

## 2022-11-16 PROCEDURE — G8427 DOCREV CUR MEDS BY ELIG CLIN: HCPCS | Performed by: INTERNAL MEDICINE

## 2022-11-16 PROCEDURE — 3017F COLORECTAL CA SCREEN DOC REV: CPT | Performed by: INTERNAL MEDICINE

## 2022-11-16 PROCEDURE — 1123F ACP DISCUSS/DSCN MKR DOCD: CPT | Performed by: INTERNAL MEDICINE

## 2022-11-16 PROCEDURE — G8420 CALC BMI NORM PARAMETERS: HCPCS | Performed by: INTERNAL MEDICINE

## 2022-11-16 NOTE — PROGRESS NOTES
Seen as f/u      Interim:    High glucose on certain days  Fair control  Decreasing metformin helped diarrhea  +stress  Does eat more CHO during stress   dementia    Diagnosed with Type 2 diabetes mellitus in 2006  Known diabetic complications: Neuropathy    Current diabetic medications   Metformin 500mg ER daily  Jardiance  25mg  Victoza  1.8  lantus 42    h/o use of sulfonylurea/ TZD      Last A1c 7.3%<-----7.8%<------ 7.1%,-----7.4 %<-----7.1% <------  6.3%<------ 7.5%<------ 8.5%<------7.6%<------- 9%<------9.8 on 1/18 <--- 10.2 <--- 9.1    Prior visit with dietician: Yes   Current diet: on average, 3 meals per day   Current exercise: walking   Current monitoring regimen: home blood tests -1  times daily     Has brought blood glucose log/meter: yes  Home blood sugar records:  Any episodes of hypoglycemia? 58    No Hx of CAD , PVD, CVA    Hyperlipidemia: controlled, taking statin  LDL 36 on 1/18   crestor 40mg fenofibrate 160mg  LDL 42 on 4/19  LDL 57 on 7/20  LDL 64  Last eye exam: 2/22  Last foot exam: 1/21 by poiatrist  Last microalbumin to creatinine ratio: 1/21    HTN: Controlled, on  lisinopril 5mg    States hair loss for last 2 years    1/18 Testosterone < 3  DHEA-S 20  TSH 1.04    Calcium high in the past but normal since 2011 1/18 Ca 9.8  7/11  Ca 11.0  6/09 Ca 10.9        Past Medical History:   Diagnosis Date    Anesthesia     hx of reaction to medications can have \"opposite\" effect with sedatives and causes patient to be jittery, agitated. Patient has concern what will be given.      Arthritis     Glaucoma     Hyperlipidemia     Hypertension     Type II or unspecified type diabetes mellitus without mention of complication, not stated as uncontrolled      Past Surgical History:   Procedure Laterality Date    BREAST BIOPSY Left     benign excisional biopsy    CARPAL TUNNEL RELEASE      bilateral    COLONOSCOPY  5/2013    HYSTERECTOMY (CERVIX STATUS UNKNOWN)      partial    MASTECTOMY, PARTIAL Left 07/18/2013    LEFT BREAST NEEDLE LOCALIZED PARTIAL MASTECTOMY EXCISIONAL    OTHER SURGICAL HISTORY      medications for sedation can make her jittery    TUBAL LIGATION       Current Outpatient Medications   Medication Sig Dispense Refill    empagliflozin (JARDIANCE) 25 MG tablet TAKE ONE TABLET BY MOUTH DAILY 90 tablet 1    ADVOCATE INSULIN PEN NEEDLES 31G X 5 MM MISC USE WITH LANTUS SOLOSTAR PENS 100 each 3    VICTOZA 18 MG/3ML SOPN SC injection INJECT 1.8MG SUBCUTANEOUSLY DAILY 9 mL 11    LANTUS SOLOSTAR 100 UNIT/ML injection pen INJECT 41 UNITS SUBCUTANEOUSLY DAILY 15 mL 11    lisinopril (PRINIVIL;ZESTRIL) 10 MG tablet TAKE ONE TABLET DAILY 90 tablet 2    rosuvastatin (CRESTOR) 40 MG tablet TAKE ONE TABLET BY MOUTH EACH EVENING 90 tablet 2    fenofibrate (TRIGLIDE) 160 MG tablet TAKE ONE TABLET BY MOUTH DAILY 90 tablet 2    metFORMIN (GLUCOPHAGE-XR) 500 MG extended release tablet TAKE ONE TABLET BY MOUTH 90 tablet 2    ONETOUCH VERIO strip 1 EACH BY IN VITRO ROUTE DAILY AS NEEDED. 100 strip 2    Glucosamine HCl (GLUCOSAMINE PO) Take by mouth      COMBIGAN 0.2-0.5 % ophthalmic solution       Biotin 67134 MCG TABS Take by mouth      timolol (TIMOPTIC) 0.25 % ophthalmic solution 1 drop 2 times daily      bimatoprost (LUMIGAN) 0.01 % SOLN ophthalmic drops Place 1 drop into both eyes nightly      Lancets MISC Use to check sugar once daily MAY SUBSTITUTE PER INSURANCE 100 each 3    Insulin Pen Needle 32G X 4 MM MISC 1 each by Does not apply route daily 100 each 3    melatonin 3 MG TABS tablet Take 10 mg by mouth nightly Indications: Pt is taking 2 tab (5 mg) daily. Total 10 mg daily. 3    Cholecalciferol (VITAMIN D3) 400 UNIT/ML LIQD Take 5,000 Int'l Units by mouth daily. No current facility-administered medications for this visit.        Review of Systems  Please see scanned document dated and signed      Objective:      LMP  (LMP Unknown)   Wt Readings from Last 3 Encounters:   09/14/22 132 lb 3.2 oz (60 kg)   04/04/22 131 lb 9.6 oz (59.7 kg)   03/15/22 134 lb (60.8 kg)     Constitutional: Well-developed, appears stated age, cooperative, in no acute distress  H/E/N/M/T:atraumatic, normocephalic, external ears, nose, lips normal without lesions  Eyes: Lids, lashes, conjunctivae and sclerae normal, No proptosis, no redness  Neck: supple, symmetrical, no swelling  Skin: No obvious rashes or lesions present. Skin and hair texture normal  Psychiatric: Judgement and Insight:  judgement and insight appear normal  Neuro: Normal without focal findings, speech is normal normal, speech is spontaneous  Chest: No labored breathing, no chest deformity, no stridor  Musculoskeletal: No joint deformity, swelling      4/19  Skeletal foot exam is normal, no skin lesions, toenails are normal, pulses are normal, 10 g monofilament is reduced big toes    Lab Reviewed   No components found for: CHLPL  Lab Results   Component Value Date    TRIG 103 03/11/2022    TRIG 99 04/27/2021    TRIG 96 07/20/2020     Lab Results   Component Value Date    HDL 50 03/11/2022    HDL 38 (L) 04/27/2021    HDL 41 07/20/2020     Lab Results   Component Value Date    LDLCALC 64 03/11/2022    LDLCALC 55 04/27/2021    LDLCALC 57 07/20/2020     Lab Results   Component Value Date    LABVLDL 21 03/11/2022    LABVLDL 20 04/27/2021    LABVLDL 19 07/20/2020     Lab Results   Component Value Date    LABA1C 7.1 03/14/2022       Assessment:     Frankie Carreon is a 78 y.o. female with :    1.T2DM: Longstanding, uncontrolled, provided education. Discussed with patient given hyperglycemia, A1c, duration of DM, recommended basal insulin. She was hesitant but agreed after discussion. A1c slightly increased. Needs to check post prandial glucose. May need meal time insulin, she would like to make diet changes  Advised 30 gm CHO with meal  She had days of high glucose , improved and then had high glucose again  Decreased metformin given GI issues  2. HTN : BP at goal  3. HLD: LDL at goal  4. Hair loss : DHEA-S, TSH nl. Testosterone low, discussed it is unlikely cause of her hair loss. Advised to see dermatology to rule out any other causes. 5.Hypercalcemia: Improved    Plan:        lantus 42 units daily, advised self titration for high or low glucose    continue victoza   Continue metformin once a day    May need prandial insulin, she would like to avoid   Advise to check blood sugar 1 -2 times a day   Patient to send blood sugar log for titration. Advise to exercise regularly. Advise to low simple carbohydrate and protein with each  meal diet. Diabetes Care: recommend yearly eye exam, foot exam and urine microalbumin to   creatinine ratio. Patient is up-to-date.         F/u in 6 months as will see PCP

## 2022-12-15 ENCOUNTER — OFFICE VISIT (OUTPATIENT)
Dept: PRIMARY CARE CLINIC | Age: 77
End: 2022-12-15
Payer: MEDICARE

## 2022-12-15 VITALS
BODY MASS INDEX: 23.71 KG/M2 | HEART RATE: 93 BPM | SYSTOLIC BLOOD PRESSURE: 85 MMHG | DIASTOLIC BLOOD PRESSURE: 55 MMHG | WEIGHT: 133.8 LBS | HEIGHT: 63 IN

## 2022-12-15 DIAGNOSIS — R80.9 CONTROLLED TYPE 2 DIABETES MELLITUS WITH MICROALBUMINURIA, WITH LONG-TERM CURRENT USE OF INSULIN (HCC): ICD-10-CM

## 2022-12-15 DIAGNOSIS — Z79.4 CONTROLLED TYPE 2 DIABETES MELLITUS WITH MICROALBUMINURIA, WITH LONG-TERM CURRENT USE OF INSULIN (HCC): ICD-10-CM

## 2022-12-15 DIAGNOSIS — E11.29 CONTROLLED TYPE 2 DIABETES MELLITUS WITH MICROALBUMINURIA, WITH LONG-TERM CURRENT USE OF INSULIN (HCC): ICD-10-CM

## 2022-12-15 DIAGNOSIS — L03.116 LEFT LEG CELLULITIS: Primary | ICD-10-CM

## 2022-12-15 DIAGNOSIS — I10 ESSENTIAL HYPERTENSION: ICD-10-CM

## 2022-12-15 PROCEDURE — G8427 DOCREV CUR MEDS BY ELIG CLIN: HCPCS | Performed by: FAMILY MEDICINE

## 2022-12-15 PROCEDURE — 3074F SYST BP LT 130 MM HG: CPT | Performed by: FAMILY MEDICINE

## 2022-12-15 PROCEDURE — G8420 CALC BMI NORM PARAMETERS: HCPCS | Performed by: FAMILY MEDICINE

## 2022-12-15 PROCEDURE — G8400 PT W/DXA NO RESULTS DOC: HCPCS | Performed by: FAMILY MEDICINE

## 2022-12-15 PROCEDURE — G8484 FLU IMMUNIZE NO ADMIN: HCPCS | Performed by: FAMILY MEDICINE

## 2022-12-15 PROCEDURE — 3051F HG A1C>EQUAL 7.0%<8.0%: CPT | Performed by: FAMILY MEDICINE

## 2022-12-15 PROCEDURE — 1123F ACP DISCUSS/DSCN MKR DOCD: CPT | Performed by: FAMILY MEDICINE

## 2022-12-15 PROCEDURE — 1036F TOBACCO NON-USER: CPT | Performed by: FAMILY MEDICINE

## 2022-12-15 PROCEDURE — 3017F COLORECTAL CA SCREEN DOC REV: CPT | Performed by: FAMILY MEDICINE

## 2022-12-15 PROCEDURE — 99214 OFFICE O/P EST MOD 30 MIN: CPT | Performed by: FAMILY MEDICINE

## 2022-12-15 PROCEDURE — 1090F PRES/ABSN URINE INCON ASSESS: CPT | Performed by: FAMILY MEDICINE

## 2022-12-15 PROCEDURE — 2022F DILAT RTA XM EVC RTNOPTHY: CPT | Performed by: FAMILY MEDICINE

## 2022-12-15 PROCEDURE — 3078F DIAST BP <80 MM HG: CPT | Performed by: FAMILY MEDICINE

## 2022-12-15 RX ORDER — LISINOPRIL 5 MG/1
5 TABLET ORAL DAILY
Qty: 90 TABLET | Refills: 1
Start: 2022-12-15

## 2022-12-15 RX ORDER — CEPHALEXIN 500 MG/1
500 CAPSULE ORAL 2 TIMES DAILY
Qty: 14 CAPSULE | Refills: 0 | Status: SHIPPED | OUTPATIENT
Start: 2022-12-15 | End: 2022-12-22

## 2022-12-15 ASSESSMENT — PATIENT HEALTH QUESTIONNAIRE - PHQ9
SUM OF ALL RESPONSES TO PHQ QUESTIONS 1-9: 2
2. FEELING DOWN, DEPRESSED OR HOPELESS: 1
1. LITTLE INTEREST OR PLEASURE IN DOING THINGS: 1
SUM OF ALL RESPONSES TO PHQ9 QUESTIONS 1 & 2: 2
SUM OF ALL RESPONSES TO PHQ QUESTIONS 1-9: 2

## 2022-12-15 NOTE — PROGRESS NOTES
[address] [facility] [address] [facility]  [address] [place] [id number]  Dept: [phone]  Dept Fax: [phone]     12/15/2022      Brandee Voss   1945     Chief Complaint   Patient presents with    Follow-up       HPI    Pt comes in today for routine follow up. Follows with Endo for h/o DMII. A1c 7.3%. Has cut down the metformin 1x/daily. On lisinopril 10 mg. BP is on the lower side. Asymptomatic. Wt Readings from Last 5 Encounters:   12/15/22 133 lb 12.8 oz (60.7 kg)   11/16/22 132 lb (59.9 kg)   09/14/22 132 lb 3.2 oz (60 kg)   04/04/22 131 lb 9.6 oz (59.7 kg)   03/15/22 134 lb (60.8 kg)      BP Readings from Last 5 Encounters:   12/15/22 (!) 85/55   11/16/22 115/60   09/14/22 96/67   04/04/22 106/67   03/15/22 (!) 93/56           Rash on BL LE. Follows with Dr. Stuart Juarez. The dermatology group. Was previously given a cream but unsure of the name. Unsure of what she was diagnosed with. Has had skin biopsy. No records available. Rash started getting worse 3 days ago and now leg is swollen and very tender, wound is draining. No data recorded       Prior to Visit Medications    Medication Sig Taking?  Authorizing Provider   empagliflozin (JARDIANCE) 25 MG tablet TAKE ONE TABLET BY MOUTH DAILY  Pili Forman DO   ADVOCATE INSULIN PEN NEEDLES 31G X 5 MM MISC USE WITH LANTUS SOLOSTAR PENS  Lisandro Jenkins MD   VICTOZA 18 MG/3ML SOPN SC injection INJECT 1.8MG SUBCUTANEOUSLY DAILY  Pili Forman DO   LANTUS SOLOSTAR 100 UNIT/ML injection pen INJECT [facility]  Patient taking differently: INJECT 42 UNITS SUBCUTANEOUSLY DAILY  Pili Forman DO   lisinopril (PRINIVIL;ZESTRIL) 10 MG tablet TAKE ONE TABLET DAILY  Pili Forman DO   rosuvastatin (CRESTOR) 40 MG tablet TAKE ONE TABLET BY MOUTH EACH EVENING  Pili Forman DO   fenofibrate (TRIGLIDE) 160 MG tablet TAKE ONE TABLET BY MOUTH DAILY  Pili Saldana,    metFORMIN (GLUCOPHAGE-XR) 500 MG extended release tablet TAKE ONE TABLET BY MOUTH  Terrance Raza MD   ONETOUCH VERIO strip 1 EACH BY IN VITRO ROUTE DAILY AS NEEDED. Terrance Raza MD   Glucosamine HCl (GLUCOSAMINE PO) Take by mouth  Historical Provider, MD   COMBIGAN 0.2-0.5 % ophthalmic solution   Historical Provider, MD   Blood Glucose Monitoring Suppl (TRUE METRIX METER) w/Device KIT 1 each by Does not apply route daily  Patient not taking: Reported on 2021  Bernadette Soler DO   Biotin 62633 MCG TABS Take by mouth  Historical Provider, MD   timolol (TIMOPTIC) 0.25 % ophthalmic solution 1 drop 2 times daily  Historical Provider, MD   bimatoprost (LUMIGAN) 0.01 % SOLN ophthalmic drops Place 1 drop into both eyes nightly  Historical Provider, MD   Lancets MISC Use to check sugar once daily MAY SUBSTITUTE PER INSURANCE  Lizzie Harry MD   Insulin Pen Needle 32G X 4 MM MISC 1 each by Does not apply route daily  Terrance Raza MD   melatonin 3 MG TABS tablet Take 10 mg by mouth nightly Indications: Pt is taking 2 tab (5 mg) daily. Total 10 mg daily. Livingston Bodily, APRN - CNP   Cholecalciferol (VITAMIN D3) 400 UNIT/ML LIQD Take 5,000 Int'l Units by mouth daily. Historical Provider, MD        Past Medical History:   Diagnosis Date    Anesthesia     hx of reaction to medications can have \"opposite\" effect with sedatives and causes patient to be jittery, agitated. Patient has concern what will be given.      Arthritis     Glaucoma     Hyperlipidemia     Hypertension     Type II or unspecified type diabetes mellitus without mention of complication, not stated as uncontrolled         Social History     Tobacco Use    Smoking status: Former     Packs/day: 0.25     Years: 51.00     Pack years: 12.75     Types: Cigarettes     Quit date: 3/9/2016     Years since quittin.7    Smokeless tobacco: Never    Tobacco comments:     intermittent smoking over last 51 yrs.  Sanya Arechiga has a cigarette now. Quit routine smoking 1996   Vaping Use    Vaping Use: Never used   Substance Use Topics    Alcohol use: Yes     Alcohol/week: 0.0 standard drinks     Comment: Occas. x 2-3/ month    Drug use: No        Past Surgical History:   Procedure Laterality Date    BREAST BIOPSY Left     benign excisional biopsy    CARPAL TUNNEL RELEASE      bilateral    COLONOSCOPY  5/2013    HYSTERECTOMY (CERVIX STATUS UNKNOWN)      partial    MASTECTOMY, PARTIAL Left 07/18/2013    LEFT BREAST NEEDLE LOCALIZED PARTIAL MASTECTOMY EXCISIONAL    OTHER SURGICAL HISTORY      medications for sedation can make her jittery    TUBAL LIGATION          Allergies   Allergen Reactions    Benadryl [Diphenhydramine]     Erythromycin Nausea And Vomiting        Family History   Problem Relation Age of Onset    High Blood Pressure Mother     High Cholesterol Mother     Depression Mother     High Blood Pressure Brother     High Cholesterol Brother     Diabetes Maternal Grandmother         Patient's past medical history, surgical history, family history, medications, and allergies  were all reviewed and updated as appropriate today. Review of Systems   Constitutional:  Negative for chills, fever and unexpected weight change. Respiratory:  Negative for shortness of breath. Cardiovascular:  Negative for chest pain. Gastrointestinal:  Negative for abdominal pain, diarrhea, nausea and vomiting. Skin:  Positive for rash and wound. BP (!) 85/55   Pulse 93   Ht 5' 3\" (1.6 m)   Wt 133 lb 12.8 oz (60.7 kg)   LMP  (LMP Unknown)   BMI 23.70 kg/m²      Physical Exam  Constitutional:       Appearance: Normal appearance. Cardiovascular:      Rate and Rhythm: Normal rate and regular rhythm. Heart sounds: No murmur heard. Pulmonary:      Effort: Pulmonary effort is normal. No respiratory distress. Breath sounds: Normal breath sounds. Musculoskeletal:      Right lower leg: Edema present.       Left lower leg: Edema present. Skin:     Findings: Erythema (erythematous maculaopapular rash to BL LE, left worse than right, left anterior leg with scattered areas of serosanguinous drainage, rash is diffuse with confluence of erythema in some areas) present. Assessment:  Encounter Diagnoses   Name Primary? Left leg cellulitis Yes    Controlled type 2 diabetes mellitus with microalbuminuria, with long-term current use of insulin (HCC)     Essential hypertension        Plan:    - Unspecified chronic rash to BL LE. Followed with derm. C/b what appears to be cellulitis. I have recommended keflex BID  x 7 days. Wound culture collected. ER precautions advised. - T2DM. Stable. - HTN. BP is quite low today. Lisinopril lowered to 5 mg. She will monitor home BP readings and update me on mychart periodically. New Prescriptions    CEPHALEXIN (KEFLEX) 500 MG CAPSULE    Take 1 capsule by mouth 2 times daily for 7 days        Orders Placed This Encounter   Procedures    Culture, Wound        Return in about 3 months (around 3/15/2023) for [facility] - non-fasting.     Total time spent with patient including direct consultation, evaluation, review of medical records and documentation = 343 Rubens Johnson, DO

## 2022-12-17 LAB
GRAM STAIN RESULT: ABNORMAL
ORGANISM: ABNORMAL
ORGANISM: ABNORMAL
WOUND/ABSCESS: ABNORMAL
WOUND/ABSCESS: ABNORMAL

## 2022-12-19 NOTE — RESULT ENCOUNTER NOTE
Lyle Murray, your culture does confirm a staph infection. The antibiotic you were prescribed should be helping with that.  Please don't hesitate to call with any concerns. - Dr. Thuy Reeves

## 2022-12-20 ASSESSMENT — ENCOUNTER SYMPTOMS
VOMITING: 0
ABDOMINAL PAIN: 0
NAUSEA: 0
DIARRHEA: 0
SHORTNESS OF BREATH: 0

## 2022-12-27 ENCOUNTER — PATIENT MESSAGE (OUTPATIENT)
Dept: PRIMARY CARE CLINIC | Age: 77
End: 2022-12-27

## 2022-12-27 DIAGNOSIS — L03.116 CELLULITIS OF LEFT LOWER EXTREMITY: Primary | ICD-10-CM

## 2022-12-27 NOTE — TELEPHONE ENCOUNTER
From: Saul Gan  To: Dr. Khushboo Vigil: 12/27/2022 12:28 PM EST  Subject: Cephalexin 500 mg    Hi Dr. Demetri Garner, I finished taking the above prescription on Wednesday, 12/21. There has been no noticeable difference in the rash on my legs. Because of the holidays & the recent snow storm, I have not yet been able to see the dermatologist for a 2nd opinion (hoping to make that happen by next week). Do you recommend a refill of the scrip or not? I also wanted to ask if I can go ahead & get my Covid booster since Ivroyer been off the antibiotic for a week Yasmeen Lin got his the week after our visit with you). We have both been very lax about following through with checking our blood pressure (holiday chaos! )I have cut my dosage of lisinopril to 5 mg (cutting the 10mg in half) & put Arturo back on his 25 mg of Hydrochlorothiazide. Will touch base with you ASAP after a few pressure check readings. Thanks, Dr. Tad Gaston forward to hearing from you.

## 2022-12-29 RX ORDER — CLINDAMYCIN HYDROCHLORIDE 300 MG/1
300 CAPSULE ORAL 2 TIMES DAILY
Qty: 14 CAPSULE | Refills: 0 | Status: SHIPPED | OUTPATIENT
Start: 2022-12-29 | End: 2023-01-05

## 2023-01-05 NOTE — TELEPHONE ENCOUNTER
NEUROLOGY PATIENT PRE-VISIT PLANNING     Patient was NOT contacted to complete PVP.  Note: Patient will not be contacted if there is no indication to call.     Patient Appointment is scheduled as: Established Patient     Is visit type and length scheduled correctly? Yes    [facility] Patient Demographics? Yes    3.   Is referral attached to visit? Yes    4. Were records received from referring provider? Yes    4. Patient was NOT contacted to have someone accompany them to visit.     5. Is this appointment scheduled as a Hospital Follow-Up?  No    6. Does the patient require any pre procedure or post procedure follow up? No    7. If any orders were placed at last visit or intended to be done for this visit do we have Results/Consult Notes? Yes  Labs -  Labs were ordered but not all were completed. The ones that were completed resulted on 09/06/22.  Imaging - Imaging ordered, completed and results are in chart.  Referrals - No referrals were ordered at last office visit.    8. If patient appointment is for Botox - is order pended for provider? N/A    Submitted PA for Victoza 18MG/3ML pen-injectors, Key: [id number]. Medication is a BENEFIT EXCLUSION. See attached letter. Please notify patient. Thank you.

## 2023-01-19 DIAGNOSIS — E11.65 UNCONTROLLED TYPE 2 DIABETES MELLITUS WITH HYPERGLYCEMIA (HCC): Primary | ICD-10-CM

## 2023-01-19 RX ORDER — BLOOD SUGAR DIAGNOSTIC
1 STRIP MISCELLANEOUS DAILY
Qty: 100 STRIP | Refills: 2 | Status: SHIPPED | OUTPATIENT
Start: 2023-01-19

## 2023-01-19 NOTE — TELEPHONE ENCOUNTER
Medication:   Requested Prescriptions     Pending Prescriptions Disp Refills    ONETOUCH VERIO strip [Pharmacy Med Name: ONE TOUCH VERIO TEST STRIP] 100 strip 2     Si EACH BY IN VITRO ROUTE DAILY AS NEEDED.        Last Filled:      Patient Phone Number: [phone] (home)     Last appt: 2022   Next appt: 3/15/2023    Last Labs DM:   Lab Results   Component Value Date/Time    LABA1C 7.3 2022 02:08 PM

## 2023-02-02 ENCOUNTER — OFFICE VISIT (OUTPATIENT)
Dept: VASCULAR SURGERY | Age: 78
End: 2023-02-02
Payer: MEDICARE

## 2023-02-02 VITALS
SYSTOLIC BLOOD PRESSURE: 88 MMHG | WEIGHT: 134.6 LBS | BODY MASS INDEX: 23.85 KG/M2 | HEART RATE: 78 BPM | DIASTOLIC BLOOD PRESSURE: 53 MMHG | HEIGHT: 63 IN | TEMPERATURE: 97.2 F

## 2023-02-02 DIAGNOSIS — M79.89 LEG SWELLING: Primary | ICD-10-CM

## 2023-02-02 PROCEDURE — 99203 OFFICE O/P NEW LOW 30 MIN: CPT | Performed by: SURGERY

## 2023-02-02 PROCEDURE — 1090F PRES/ABSN URINE INCON ASSESS: CPT | Performed by: SURGERY

## 2023-02-02 PROCEDURE — 1123F ACP DISCUSS/DSCN MKR DOCD: CPT | Performed by: SURGERY

## 2023-02-02 PROCEDURE — 3017F COLORECTAL CA SCREEN DOC REV: CPT | Performed by: SURGERY

## 2023-02-02 PROCEDURE — 3074F SYST BP LT 130 MM HG: CPT | Performed by: SURGERY

## 2023-02-02 PROCEDURE — G8484 FLU IMMUNIZE NO ADMIN: HCPCS | Performed by: SURGERY

## 2023-02-02 PROCEDURE — G8400 PT W/DXA NO RESULTS DOC: HCPCS | Performed by: SURGERY

## 2023-02-02 PROCEDURE — G8420 CALC BMI NORM PARAMETERS: HCPCS | Performed by: SURGERY

## 2023-02-02 PROCEDURE — G8427 DOCREV CUR MEDS BY ELIG CLIN: HCPCS | Performed by: SURGERY

## 2023-02-02 PROCEDURE — 3078F DIAST BP <80 MM HG: CPT | Performed by: SURGERY

## 2023-02-02 PROCEDURE — 1036F TOBACCO NON-USER: CPT | Performed by: SURGERY

## 2023-02-03 NOTE — PROGRESS NOTES
[address] [place] [address] Vascular Surgery  Olga Moe MD, [facility] [address], [address]    OUTPATIENT CONSULTATION    Date of Consultation: 2/2/2023    PCP:  Evgeny Ricardo DO       Chief Complaint: Leg swelling    History of Present Illness:   Fadi Bhandari is a 78 y.o. female who presents today for evaluation of leg swelling and rash. This has been ongoing for several months. She states rash started as a circular area on the right medial malleolus, enlarging in size and now the left leg is much worse. Reports she has been seen by dermatology, using some sort of steroid cream.  Denies any significant swelling. Denies any history of DVT. Has worn light compression in the past, none recently. Denies any joint swelling or arthralgias. Past Medical History:  Past Medical History:   Diagnosis Date    Anesthesia     hx of reaction to medications can have \"opposite\" effect with sedatives and causes patient to be jittery, agitated. Patient has concern what will be given. Arthritis     Glaucoma     Hyperlipidemia     Hypertension     Type II or unspecified type diabetes mellitus without mention of complication, not stated as uncontrolled      Past Surgical History:  Past Surgical History:   Procedure Laterality Date    BREAST BIOPSY Left     benign excisional biopsy    CARPAL TUNNEL RELEASE      bilateral    COLONOSCOPY  5/2013    HYSTERECTOMY (CERVIX STATUS UNKNOWN)      partial    MASTECTOMY, PARTIAL Left 07/18/2013    LEFT BREAST NEEDLE LOCALIZED PARTIAL MASTECTOMY EXCISIONAL    OTHER SURGICAL HISTORY      medications for sedation can make her jittery    TUBAL LIGATION         Home Medications:   Prior to Admission medications    Medication Sig Start Date End Date Taking?  Authorizing Provider   ONETOUCH VERIO strip 1 EACH BY IN VITRO ROUTE DAILY AS NEEDED. 1/19/23   Sonu Ang MD   lisinopril (PRINIVIL;ZESTRIL) 5 MG tablet Take 1 tablet by mouth daily 12/15/22   Evgeny Ricardo DO   empagliflozin (JARDIANCE) 25 MG tablet TAKE ONE TABLET BY MOUTH DAILY 11/9/22   Pili Forman, DO   ADVOCATE INSULIN PEN NEEDLES 31G X 5 MM MISC USE WITH LANTUS SOLOSTAR PENS 11/8/22   Magnolia Patterson MD   VICTOZA 18 MG/3ML SOPN SC injection INJECT 1.8MG SUBCUTANEOUSLY DAILY 8/9/22   Pili Forman, DO   LANTUS SOLOSTAR 100 UNIT/ML injection pen INJECT 365 [facility]  Patient taking differently: INJECT 42 UNITS SUBCUTANEOUSLY DAILY 8/9/22   Pili Stewart, DO   rosuvastatin (CRESTOR) 40 MG tablet TAKE ONE TABLET BY MOUTH EACH EVENING 5/5/22   [facility] Terry, DO   fenofibrate (TRIGLIDE) 160 MG tablet TAKE ONE TABLET BY MOUTH DAILY 5/5/22   [facility] Stewart, DO   metFORMIN (GLUCOPHAGE-XR) 500 MG extended release tablet TAKE ONE TABLET BY MOUTH 5/5/22   Magnolia Patterson MD   Glucosamine HCl (GLUCOSAMINE PO) Take by mouth    Historical Provider, MD   COMBIGAN 0.2-0.5 % ophthalmic solution  1/18/22   Historical Provider, MD   Blood Glucose Monitoring Suppl (TRUE METRIX METER) w/Device KIT 1 each by Does not apply route daily  Patient not taking: Reported on 4/1/2021 9/29/20 4/1/21  Parviz Soler, DO   Biotin 54599 MCG TABS Take by mouth    Historical Provider, MD   timolol (TIMOPTIC) 0.25 % ophthalmic solution 1 drop 2 times daily    Historical Provider, MD   bimatoprost (LUMIGAN) 0.01 % SOLN ophthalmic drops Place 1 drop into both eyes nightly    Historical Provider, MD   Lancets MISC Use to check sugar once daily MAY SUBSTITUTE PER INSURANCE 6/1/18   Tye Madison MD   Insulin Pen Needle 32G X 4 MM MISC 1 each by Does not apply route daily 3/14/18   Magnolia Patterson MD   melatonin 3 MG TABS tablet Take 10 mg by mouth nightly Indications: Pt is taking 2 tab (5 mg) daily. Total 10 mg daily. 1/14/14   Pranav Liban, APRN - CNP   Cholecalciferol (VITAMIN D3) 400 UNIT/ML LIQD Take 5,000 Int'l Units by mouth daily.     Historical Provider, MD        Allergies:  Benadryl [diphenhydramine] and Erythromycin     Social History:    Social History     Socioeconomic History    Marital status:      Spouse name: Not on file    Number of children: Not on file    Years of education: Not on file    Highest education level: Not on file   Occupational History    Not on file   Tobacco Use    Smoking status: Former     Packs/day: 0.25     Years: 51.00     Pack years: 12.75     Types: Cigarettes     Quit date: 3/9/2016     Years since quittin.9    Smokeless tobacco: Never    Tobacco comments:     intermittent smoking over last 46 yrs. Ever Silva has a cigarette now. Quit routine smoking    Vaping Use    Vaping Use: Never used   Substance and Sexual Activity    Alcohol use: Yes     Alcohol/week: 0.0 standard drinks     Comment: Occas. x 2-3/ month    Drug use: No    Sexual activity: Yes     Partners: Male   Other Topics Concern    Not on file   Social History Narrative    Not on file     Social Determinants of Health     Financial Resource Strain: Low Risk     Difficulty of Paying Living Expenses: Not hard at all   Food Insecurity: No Food Insecurity    Worried About Running Out of Food in the Last Year: Never true    Ran Out of Food in the Last Year: Never true   Transportation Needs: Not on file   Physical Activity: Not on file   Stress: Not on file   Social Connections: Not on file   Intimate Partner Violence: Not on file   Housing Stability: Not on file     Review of Systems:  Pertinent positive and negative items are noted in the HPI. A comprehensive review of all other symptoms is otherwise negative. PhysicalExamination:    Vitals:    23 1119   BP: (!) 88/53   Site: Right Upper Arm   Position: Sitting   Pulse: 78   Temp: 97.2 °F (36.2 °C)   TempSrc: Temporal   Weight: 134 lb 9.6 oz (61.1 kg)   Height: 5' 3\" (1.6 m)     Body mass index is 23.84 kg/m². Physical Exam  General:  Very pleasant female, AAO x 3. NAD.   WD/WN  Heart:  RRR no m/r/g  Lungs:  CTA B no w/r/r  Extremities: BLE warm and well-perfused. No deformities. Normal ROM. No joint swelling or tenderness. Trace to no edema. Scattered telangiectasias. Diffuse raised erythematous rash on the left anterior medial lower leg with several areas of scattered circular rash more proximally. On the right, there is a similar rash confined to the medial malleolus and then quite a few areas of individual circular slightly raised lesions. No skin thickening or hemosiderin deposition. Vascular:  B femoral, popliteal DP and PT 2+      Diagnosis:     Dermatitis/rash of bilateral lower extremities    Plan: On my evaluation, the skin lesions appear to be more of a dermatological condition. No evidence of significant venous insufficiency. Certainly, this does not appear to be a rash secondary to venous insufficiency. Advised her that a light compression could possibly benefit; however, I would be concerned about the nylon component of most stockings. We did put a non-Nylon spandi- on her today. Advised her to follow-up with her dermatologist.  She may need a skin biopsy of one of the more prominent proximal lesions. I would be concerned about biopsying down near the gaiter region, as I think this would be difficult to heal.  Otherwise, a skin biopsy for further clarification and possibly a round of oral steroids may be needed. I will be happy to see her at any time on an as-needed basis.

## 2023-02-11 DIAGNOSIS — E78.2 MIXED HYPERLIPIDEMIA: ICD-10-CM

## 2023-02-13 DIAGNOSIS — E78.2 MIXED HYPERLIPIDEMIA: ICD-10-CM

## 2023-02-13 RX ORDER — ROSUVASTATIN CALCIUM 40 MG/1
TABLET, COATED ORAL
Qty: 90 TABLET | Refills: 3 | Status: SHIPPED | OUTPATIENT
Start: 2023-02-13

## 2023-02-13 RX ORDER — LISINOPRIL 10 MG/1
TABLET ORAL
Qty: 90 TABLET | Refills: 3 | OUTPATIENT
Start: 2023-02-13

## 2023-02-15 RX ORDER — LISINOPRIL 10 MG/1
TABLET ORAL
Qty: 90 TABLET | Refills: 2 | OUTPATIENT
Start: 2023-02-15

## 2023-02-15 RX ORDER — FENOFIBRATE 160 MG/1
TABLET ORAL
Qty: 90 TABLET | Refills: 2 | Status: SHIPPED | OUTPATIENT
Start: 2023-02-15

## 2023-02-15 RX ORDER — LISINOPRIL 5 MG/1
5 TABLET ORAL DAILY
Qty: 90 TABLET | Refills: 1 | Status: SHIPPED | OUTPATIENT
Start: 2023-02-15

## 2023-02-15 NOTE — TELEPHONE ENCOUNTER
Dhaval Sheehan from [facility] calling about her request for refill on pt's Lisinopril. She says it did not come back to her and she is wondering if pt is still on this med.   Please clarify

## 2023-02-15 NOTE — TELEPHONE ENCOUNTER
Spoke with Dhaval Sheehan at Pharmacy.  They do not have the new strength of Lisinopril  Please send new script to [address] [place] [address]

## 2023-02-16 DIAGNOSIS — E11.9 INSULIN DEPENDENT TYPE 2 DIABETES MELLITUS (HCC): ICD-10-CM

## 2023-02-16 DIAGNOSIS — Z79.4 INSULIN DEPENDENT TYPE 2 DIABETES MELLITUS (HCC): ICD-10-CM

## 2023-02-17 RX ORDER — EMPAGLIFLOZIN 25 MG/1
TABLET, FILM COATED ORAL
Qty: 90 TABLET | Refills: 1 | Status: SHIPPED | OUTPATIENT
Start: 2023-02-17

## 2023-03-15 ENCOUNTER — OFFICE VISIT (OUTPATIENT)
Dept: ENDOCRINOLOGY | Age: 78
End: 2023-03-15
Payer: MEDICARE

## 2023-03-15 VITALS
DIASTOLIC BLOOD PRESSURE: 81 MMHG | WEIGHT: 131.2 LBS | HEART RATE: 78 BPM | TEMPERATURE: 98 F | OXYGEN SATURATION: 98 % | RESPIRATION RATE: 14 BRPM | HEIGHT: 63 IN | SYSTOLIC BLOOD PRESSURE: 102 MMHG | BODY MASS INDEX: 23.25 KG/M2

## 2023-03-15 DIAGNOSIS — E11.65 UNCONTROLLED TYPE 2 DIABETES MELLITUS WITH HYPERGLYCEMIA (HCC): Primary | ICD-10-CM

## 2023-03-15 LAB — HBA1C MFR BLD: 8.1 %

## 2023-03-15 PROCEDURE — G8400 PT W/DXA NO RESULTS DOC: HCPCS | Performed by: INTERNAL MEDICINE

## 2023-03-15 PROCEDURE — 3046F HEMOGLOBIN A1C LEVEL >9.0%: CPT | Performed by: INTERNAL MEDICINE

## 2023-03-15 PROCEDURE — G8427 DOCREV CUR MEDS BY ELIG CLIN: HCPCS | Performed by: INTERNAL MEDICINE

## 2023-03-15 PROCEDURE — G8420 CALC BMI NORM PARAMETERS: HCPCS | Performed by: INTERNAL MEDICINE

## 2023-03-15 PROCEDURE — 1090F PRES/ABSN URINE INCON ASSESS: CPT | Performed by: INTERNAL MEDICINE

## 2023-03-15 PROCEDURE — 3017F COLORECTAL CA SCREEN DOC REV: CPT | Performed by: INTERNAL MEDICINE

## 2023-03-15 PROCEDURE — 1123F ACP DISCUSS/DSCN MKR DOCD: CPT | Performed by: INTERNAL MEDICINE

## 2023-03-15 PROCEDURE — 1036F TOBACCO NON-USER: CPT | Performed by: INTERNAL MEDICINE

## 2023-03-15 PROCEDURE — 2022F DILAT RTA XM EVC RTNOPTHY: CPT | Performed by: INTERNAL MEDICINE

## 2023-03-15 PROCEDURE — 3074F SYST BP LT 130 MM HG: CPT | Performed by: INTERNAL MEDICINE

## 2023-03-15 PROCEDURE — 3079F DIAST BP 80-89 MM HG: CPT | Performed by: INTERNAL MEDICINE

## 2023-03-15 PROCEDURE — G8484 FLU IMMUNIZE NO ADMIN: HCPCS | Performed by: INTERNAL MEDICINE

## 2023-03-15 PROCEDURE — 83036 HEMOGLOBIN GLYCOSYLATED A1C: CPT | Performed by: INTERNAL MEDICINE

## 2023-03-15 PROCEDURE — 99214 OFFICE O/P EST MOD 30 MIN: CPT | Performed by: INTERNAL MEDICINE

## 2023-03-15 NOTE — PROGRESS NOTES
Seen as f/u      Interim:    High glucose   Decreasing metformin helped diarrhea  +stress  Does eat more CHO during stress   dementia    Diagnosed with Type 2 diabetes mellitus in 2006  Known diabetic complications: Neuropathy    Current diabetic medications   Metformin 500mg ER daily  Jardiance  25mg  Victoza  1.8  lantus 44    h/o use of sulfonylurea/ TZD      Last A1c 8.1%<------7.3%<-----7.8%<------ 7.1%,-----7.4 %<-----7.1% <------  6.3%<------ 7.5%<------ 8.5%<------7.6%<------- 9%<------9.8 on 1/18 <--- 10.2 <--- 9.1    Prior visit with dietician: Yes   Current diet: on average, 3 meals per day   Current exercise: walking   Current monitoring regimen: home blood tests -1  times daily     Has brought blood glucose log/meter: yes  Home blood sugar records: 100s  Any episodes of hypoglycemia? 58    No Hx of CAD , PVD, CVA    Hyperlipidemia: controlled, taking statin  LDL 36 on 1/18   crestor 40mg fenofibrate 160mg  LDL 42 on 4/19  LDL 57 on 7/20  LDL 64  Last eye exam: 3/23  Last foot exam: 1/21 by poiatrist  Last microalbumin to creatinine ratio: 1/21    HTN: Controlled, on  lisinopril 5mg    States hair loss for last 2 years    1/18 Testosterone < 3  DHEA-S 20  TSH 1.04    Calcium high in the past but normal since 2011 1/18 Ca 9.8  7/11  Ca 11.0  6/09 Ca 10.9        Past Medical History:   Diagnosis Date    Anesthesia     hx of reaction to medications can have \"opposite\" effect with sedatives and causes patient to be jittery, agitated. Patient has concern what will be given.      Arthritis     Glaucoma     Hyperlipidemia     Hypertension     Type II or unspecified type diabetes mellitus without mention of complication, not stated as uncontrolled      Past Surgical History:   Procedure Laterality Date    BREAST BIOPSY Left     benign excisional biopsy    CARPAL TUNNEL RELEASE      bilateral    COLONOSCOPY  5/2013    HYSTERECTOMY (CERVIX STATUS UNKNOWN)      partial    MASTECTOMY, PARTIAL Left 07/18/2013    LEFT BREAST NEEDLE LOCALIZED PARTIAL MASTECTOMY EXCISIONAL    OTHER SURGICAL HISTORY      medications for sedation can make her jittery    TUBAL LIGATION       Current Outpatient Medications   Medication Sig Dispense Refill    lisinopril (PRINIVIL;ZESTRIL) 5 MG tablet Take 1 tablet by mouth daily 90 tablet 1    JARDIANCE 25 MG tablet TAKE ONE TABLET BY MOUTH DAILY 90 tablet 1    fenofibrate (TRIGLIDE) 160 MG tablet TAKE ONE TABLET BY MOUTH DAILY 90 tablet 2    rosuvastatin (CRESTOR) 40 MG tablet TAKE ONE TABLET BY MOUTH EACH EVENING 90 tablet 3    ONETOUCH VERIO strip 1 EACH BY IN VITRO ROUTE DAILY AS NEEDED. 100 strip 2    ADVOCATE INSULIN PEN NEEDLES 31G X 5 MM MISC USE WITH LANTUS SOLOSTAR PENS 100 each 3    VICTOZA 18 MG/3ML SOPN SC injection INJECT 1.8MG SUBCUTANEOUSLY DAILY 9 mL 11    LANTUS SOLOSTAR 100 UNIT/ML injection pen INJECT 41 UNITS SUBCUTANEOUSLY DAILY (Patient taking differently: INJECT 42 UNITS SUBCUTANEOUSLY DAILY) 15 mL 11    metFORMIN (GLUCOPHAGE-XR) 500 MG extended release tablet TAKE ONE TABLET BY MOUTH 90 tablet 2    Glucosamine HCl (GLUCOSAMINE PO) Take by mouth      COMBIGAN 0.2-0.5 % ophthalmic solution       Biotin 99804 MCG TABS Take by mouth      timolol (TIMOPTIC) 0.25 % ophthalmic solution 1 drop 2 times daily      bimatoprost (LUMIGAN) 0.01 % SOLN ophthalmic drops Place 1 drop into both eyes nightly      Lancets MISC Use to check sugar once daily MAY SUBSTITUTE PER INSURANCE 100 each 3    Insulin Pen Needle 32G X 4 MM MISC 1 each by Does not apply route daily 100 each 3    melatonin 3 MG TABS tablet Take 10 mg by mouth nightly Indications: Pt is taking 2 tab (5 mg) daily. Total 10 mg daily. 3    Cholecalciferol (VITAMIN D3) 400 UNIT/ML LIQD Take 5,000 Int'l Units by mouth daily. No current facility-administered medications for this visit.        Review of Systems  Please see scanned document dated and signed      Objective:        LMP  (LMP Unknown)   Wt Readings from Last 3 Encounters:   02/02/23 134 lb 9.6 oz (61.1 kg)   12/15/22 133 lb 12.8 oz (60.7 kg)   11/16/22 132 lb (59.9 kg)     Constitutional: Well-developed, appears stated age, cooperative, in no acute distress  H/E/N/M/T:atraumatic, normocephalic, external ears, nose, lips normal without lesions  Eyes: Lids, lashes, conjunctivae and sclerae normal, No proptosis, no redness  Neck: supple, symmetrical, no swelling  Skin: No obvious rashes or lesions present. Skin and hair texture normal  Psychiatric: Judgement and Insight:  judgement and insight appear normal  Neuro: Normal without focal findings, speech is normal normal, speech is spontaneous  Chest: No labored breathing, no chest deformity, no stridor  Musculoskeletal: No joint deformity, swelling      4/19  Skeletal foot exam is normal, no skin lesions, toenails are normal, pulses are normal, 10 g monofilament is reduced big toes    Lab Reviewed   No components found for: CHLPL  Lab Results   Component Value Date    TRIG 103 03/11/2022    TRIG 99 04/27/2021    TRIG 96 07/20/2020     Lab Results   Component Value Date    HDL 50 03/11/2022    HDL 38 (L) 04/27/2021    HDL 41 07/20/2020     Lab Results   Component Value Date    LDLCALC 64 03/11/2022    LDLCALC 55 04/27/2021    LDLCALC 57 07/20/2020     Lab Results   Component Value Date    LABVLDL 21 03/11/2022    LABVLDL 20 04/27/2021    LABVLDL 19 07/20/2020     Lab Results   Component Value Date    LABA1C 7.3 11/16/2022       Assessment:     Ginny Alves is a 78 y.o. female with :    1.T2DM: Longstanding, uncontrolled, provided education. Discussed with patient given hyperglycemia, A1c, duration of DM, recommended basal insulin. She was hesitant but agreed after discussion. A1c slightly increased. Needs to check post prandial glucose. May need meal time insulin, she would like to make diet changes  Advised 30 gm CHO with meal  Decreased metformin given GI issues  2. HTN : BP at goal  3. HLD: LDL at goal  4. Hair loss : DHEA-S, TSH nl. Testosterone low, discussed it is unlikely cause of her hair loss. Advised to see dermatology to rule out any other causes. 5.Hypercalcemia: Improved    Plan:        lantus 44-=--> 48 units daily, advised self titration for high or low glucose    continue victoza   Continue metformin once a day    May need prandial insulin, she would like to avoid   Advise to check blood sugar 1 -2 times a day   Patient to send blood sugar log for titration. Advise to exercise regularly. Advise to low simple carbohydrate and protein with each  meal diet. Diabetes Care: recommend yearly eye exam, foot exam and urine microalbumin to   creatinine ratio. Patient is up-to-date.         F/u in 6 months as will see PCP

## 2023-03-16 LAB
CREAT UR-MCNC: 58.8 MG/DL (ref 28–259)
MICROALBUMIN UR DL<=1MG/L-MCNC: <1.2 MG/DL
MICROALBUMIN/CREAT UR: NORMAL MG/G (ref 0–30)

## 2023-04-06 ENCOUNTER — TELEPHONE (OUTPATIENT)
Dept: ENDOCRINOLOGY | Age: 78
End: 2023-04-06

## 2023-04-06 NOTE — TELEPHONE ENCOUNTER
Pt forgot to take her lantus yesterday. Her blood sugar is 167, she wants to know if it is ok to take insulin this morning as well as tonight?        Call back # [phone]

## 2023-05-15 RX ORDER — LIRAGLUTIDE 6 MG/ML
INJECTION SUBCUTANEOUS
Qty: 9 ML | Refills: 11 | Status: SHIPPED | OUTPATIENT
Start: 2023-05-15

## 2023-06-08 RX ORDER — BLOOD SUGAR DIAGNOSTIC
STRIP MISCELLANEOUS
Qty: 200 EACH | Refills: 1 | Status: SHIPPED | OUTPATIENT
Start: 2023-06-08

## 2023-06-08 NOTE — TELEPHONE ENCOUNTER
Medication:   Requested Prescriptions     Pending Prescriptions Disp Refills    ADVOCATE INSULIN PEN NEEDLES 31G X 5 MM MISC [Pharmacy Med Name: ADVOCATE PEN NDL 5MM 31G 31G X 5 MM Miscellaneous]  1     Sig: USE WITH LANTUS SOLOSTAR PENS       Last Filled:      Patient Phone Number: [phone] (home)     Last appt: 3/15/2023   Next appt: 7/3/2023    Last Labs DM:   Lab Results   Component Value Date/Time    LABA1C 8.1 03/15/2023 02:53 PM

## 2023-07-03 ENCOUNTER — OFFICE VISIT (OUTPATIENT)
Dept: ENDOCRINOLOGY | Age: 78
End: 2023-07-03
Payer: MEDICARE

## 2023-07-03 VITALS
BODY MASS INDEX: 23.74 KG/M2 | TEMPERATURE: 97 F | HEIGHT: 63 IN | WEIGHT: 134 LBS | HEART RATE: 77 BPM | OXYGEN SATURATION: 97 % | DIASTOLIC BLOOD PRESSURE: 64 MMHG | SYSTOLIC BLOOD PRESSURE: 112 MMHG | RESPIRATION RATE: 14 BRPM

## 2023-07-03 DIAGNOSIS — E11.65 UNCONTROLLED TYPE 2 DIABETES MELLITUS WITH HYPERGLYCEMIA (HCC): ICD-10-CM

## 2023-07-03 DIAGNOSIS — E11.65 UNCONTROLLED TYPE 2 DIABETES MELLITUS WITH HYPERGLYCEMIA (HCC): Primary | ICD-10-CM

## 2023-07-03 LAB
ANION GAP SERPL CALCULATED.3IONS-SCNC: 8 MMOL/L (ref 3–16)
BUN SERPL-MCNC: 30 MG/DL (ref 7–20)
CALCIUM SERPL-MCNC: 10.2 MG/DL (ref 8.3–10.6)
CHLORIDE SERPL-SCNC: 105 MMOL/L (ref 99–110)
CO2 SERPL-SCNC: 28 MMOL/L (ref 21–32)
CREAT SERPL-MCNC: 1.1 MG/DL (ref 0.6–1.2)
GFR SERPLBLD CREATININE-BSD FMLA CKD-EPI: 52 ML/MIN/{1.73_M2}
GLUCOSE SERPL-MCNC: 150 MG/DL (ref 70–99)
POTASSIUM SERPL-SCNC: 4.4 MMOL/L (ref 3.5–5.1)
SODIUM SERPL-SCNC: 141 MMOL/L (ref 136–145)

## 2023-07-03 PROCEDURE — G8427 DOCREV CUR MEDS BY ELIG CLIN: HCPCS | Performed by: INTERNAL MEDICINE

## 2023-07-03 PROCEDURE — 2022F DILAT RTA XM EVC RTNOPTHY: CPT | Performed by: INTERNAL MEDICINE

## 2023-07-03 PROCEDURE — 3074F SYST BP LT 130 MM HG: CPT | Performed by: INTERNAL MEDICINE

## 2023-07-03 PROCEDURE — G8420 CALC BMI NORM PARAMETERS: HCPCS | Performed by: INTERNAL MEDICINE

## 2023-07-03 PROCEDURE — 1036F TOBACCO NON-USER: CPT | Performed by: INTERNAL MEDICINE

## 2023-07-03 PROCEDURE — 3052F HG A1C>EQUAL 8.0%<EQUAL 9.0%: CPT | Performed by: INTERNAL MEDICINE

## 2023-07-03 PROCEDURE — 1123F ACP DISCUSS/DSCN MKR DOCD: CPT | Performed by: INTERNAL MEDICINE

## 2023-07-03 PROCEDURE — 1090F PRES/ABSN URINE INCON ASSESS: CPT | Performed by: INTERNAL MEDICINE

## 2023-07-03 PROCEDURE — 3078F DIAST BP <80 MM HG: CPT | Performed by: INTERNAL MEDICINE

## 2023-07-03 PROCEDURE — 99214 OFFICE O/P EST MOD 30 MIN: CPT | Performed by: INTERNAL MEDICINE

## 2023-07-03 PROCEDURE — G8400 PT W/DXA NO RESULTS DOC: HCPCS | Performed by: INTERNAL MEDICINE

## 2023-07-03 PROCEDURE — 3017F COLORECTAL CA SCREEN DOC REV: CPT | Performed by: INTERNAL MEDICINE

## 2023-07-03 NOTE — PROGRESS NOTES
prandial glucose. May need meal time insulin, she would like to make diet changes. Check A1c  Advised 30 gm CHO with meal  Decreased metformin given GI issues  2. HTN : BP at goal  3. HLD: LDL at goal  4. Hair loss : DHEA-S, TSH nl. Testosterone low, discussed it is unlikely cause of her hair loss. Advised to see dermatology to rule out any other causes. 5.Hypercalcemia: Improved, recheck    Plan:        lantus 48 units daily, advised self titration for high or low glucose    continue victoza   Continue metformin once a day    May need prandial insulin, she would like to avoid   Advise to check blood sugar 1 -2 times a day   Patient to send blood sugar log for titration. Advise to exercise regularly. Advise to low simple carbohydrate and protein with each  meal diet. Diabetes Care: recommend yearly eye exam, foot exam and urine microalbumin to   creatinine ratio. Patient is up-to-date.         F/u in 6 months as will see PCP

## 2023-07-04 LAB
EST. AVERAGE GLUCOSE BLD GHB EST-MCNC: 182.9 MG/DL
HBA1C MFR BLD: 8 %

## 2023-07-17 RX ORDER — INSULIN GLARGINE 100 [IU]/ML
INJECTION, SOLUTION SUBCUTANEOUS
Qty: 45 ML | Refills: 11 | Status: SHIPPED | OUTPATIENT
Start: 2023-07-17

## 2023-07-17 NOTE — TELEPHONE ENCOUNTER
Medication:   Requested Prescriptions     Pending Prescriptions Disp Refills    LANTUS SOLOSTAR 100 UNIT/ML injection pen [Pharmacy Med Name: Jeanie Pagan 100 UNITS/M 100 Solution Pen-injector] 45 mL 11     Sig: INJECT 41 UNITS SUBCUTANEOUSLY DAILY     Last Filled:  7/17/23    Last appt: 12/15/2022   Next appt: Visit date not found

## 2023-08-03 DIAGNOSIS — E11.9 INSULIN DEPENDENT TYPE 2 DIABETES MELLITUS (HCC): ICD-10-CM

## 2023-08-03 DIAGNOSIS — Z79.4 INSULIN DEPENDENT TYPE 2 DIABETES MELLITUS (HCC): ICD-10-CM

## 2023-08-04 RX ORDER — LISINOPRIL 5 MG/1
TABLET ORAL
Qty: 90 TABLET | Refills: 0 | Status: SHIPPED | OUTPATIENT
Start: 2023-08-04

## 2023-08-04 RX ORDER — EMPAGLIFLOZIN 25 MG/1
TABLET, FILM COATED ORAL
Qty: 30 TABLET | Refills: 1 | Status: SHIPPED | OUTPATIENT
Start: 2023-08-04

## 2023-08-04 NOTE — TELEPHONE ENCOUNTER
Now, Sharon Alvarado from [facility] calling about these refills saying pt is leaving town tomorrow and needs to take them with her

## 2023-08-04 NOTE — TELEPHONE ENCOUNTER
LAST SEEN       NEXT APPOINTMENT       LAST FILL  12/15/22  N. A   Meds Pended      Patient is stating they are out medications and will be going out of town and needs the medication

## 2023-08-04 NOTE — TELEPHONE ENCOUNTER
Medication:   Requested Prescriptions     Pending Prescriptions Disp Refills    lisinopril (PRINIVIL;ZESTRIL) 5 MG tablet [Pharmacy Med Name: LISINOPRIL 5 MG TABS 5 Tablet] 90 tablet 3     Sig: TAKE ONE TABLET DAILY    JARDIANCE 25 MG tablet [Pharmacy Med Name: Faythe Sour 25 MG TABLET 25 Tablet] 30 tablet 11     Sig: TAKE ONE TABLET BY MOUTH DAILY        Last Filled:  2/17/23, 2/15/23    Last appt: 12/15/2022   Next appt: Visit date not found  Return in about 3 months (around 3/15/2023) for [employer] [id number] - non-fasting. Last OARRS: No flowsheet data found.

## 2023-08-21 DIAGNOSIS — Z79.4 CONTROLLED TYPE 2 DIABETES MELLITUS WITH MICROALBUMINURIA, WITH LONG-TERM CURRENT USE OF INSULIN (HCC): Primary | ICD-10-CM

## 2023-08-21 DIAGNOSIS — E11.29 CONTROLLED TYPE 2 DIABETES MELLITUS WITH MICROALBUMINURIA, WITH LONG-TERM CURRENT USE OF INSULIN (HCC): Primary | ICD-10-CM

## 2023-08-21 DIAGNOSIS — R80.9 CONTROLLED TYPE 2 DIABETES MELLITUS WITH MICROALBUMINURIA, WITH LONG-TERM CURRENT USE OF INSULIN (HCC): Primary | ICD-10-CM

## 2023-08-31 DIAGNOSIS — Z79.4 INSULIN DEPENDENT TYPE 2 DIABETES MELLITUS (HCC): ICD-10-CM

## 2023-08-31 DIAGNOSIS — E11.9 INSULIN DEPENDENT TYPE 2 DIABETES MELLITUS (HCC): ICD-10-CM

## 2023-09-30 ENCOUNTER — APPOINTMENT (OUTPATIENT)
Dept: GENERAL RADIOLOGY | Age: 78
End: 2023-09-30
Attending: EMERGENCY MEDICINE
Payer: MEDICARE

## 2023-09-30 ENCOUNTER — HOSPITAL ENCOUNTER (EMERGENCY)
Age: 78
Discharge: HOME | End: 2023-09-30
Attending: EMERGENCY MEDICINE
Payer: MEDICARE

## 2023-09-30 VITALS
DIASTOLIC BLOOD PRESSURE: 69 MMHG | HEART RATE: 71 BPM | RESPIRATION RATE: 16 BRPM | OXYGEN SATURATION: 97 % | SYSTOLIC BLOOD PRESSURE: 123 MMHG | HEIGHT: 63 IN | TEMPERATURE: 97.9 F | BODY MASS INDEX: 24.59 KG/M2 | WEIGHT: 138.8 LBS

## 2023-09-30 DIAGNOSIS — M79.605 LEFT LEG PAIN: Primary | ICD-10-CM

## 2023-09-30 LAB
ANION GAP SERPL CALCULATED.3IONS-SCNC: 14 MMOL/L (ref 3–16)
BASOPHILS # BLD: 0.1 K/UL (ref 0–0.2)
BASOPHILS NFR BLD: 0.8 %
BUN SERPL-MCNC: 31 MG/DL (ref 7–20)
CALCIUM SERPL-MCNC: 10.1 MG/DL (ref 8.3–10.6)
CHLORIDE SERPL-SCNC: 104 MMOL/L (ref 99–110)
CK SERPL-CCNC: 177 U/L (ref 26–192)
CO2 SERPL-SCNC: 23 MMOL/L (ref 21–32)
CREAT SERPL-MCNC: 1.1 MG/DL (ref 0.6–1.2)
D DIMER: 0.4 UG/ML FEU (ref 0–0.6)
DEPRECATED RDW RBC AUTO: 14.9 % (ref 12.4–15.4)
EOSINOPHIL # BLD: 0.5 K/UL (ref 0–0.6)
EOSINOPHIL NFR BLD: 5.2 %
GFR SERPLBLD CREATININE-BSD FMLA CKD-EPI: 52 ML/MIN/{1.73_M2}
GLUCOSE SERPL-MCNC: 203 MG/DL (ref 70–99)
HCT VFR BLD AUTO: 39.8 % (ref 36–48)
HGB BLD-MCNC: 13.1 G/DL (ref 12–16)
LYMPHOCYTES # BLD: 1.7 K/UL (ref 1–5.1)
LYMPHOCYTES NFR BLD: 19.5 %
MCH RBC QN AUTO: 27.1 PG (ref 26–34)
MCHC RBC AUTO-ENTMCNC: 33 G/DL (ref 31–36)
MCV RBC AUTO: 82.2 FL (ref 80–100)
MONOCYTES # BLD: 0.5 K/UL (ref 0–1.3)
MONOCYTES NFR BLD: 6 %
NEUTROPHILS # BLD: 6.1 K/UL (ref 1.7–7.7)
NEUTROPHILS NFR BLD: 68.5 %
PLATELET # BLD AUTO: 190 K/UL (ref 135–450)
PMV BLD AUTO: 9.7 FL (ref 5–10.5)
POTASSIUM SERPL-SCNC: 3.9 MMOL/L (ref 3.5–5.1)
RBC # BLD AUTO: 4.84 M/UL (ref 4–5.2)
SODIUM SERPL-SCNC: 141 MMOL/L (ref 136–145)
WBC # BLD AUTO: 8.9 K/UL (ref 4–11)

## 2023-09-30 PROCEDURE — 73590 X-RAY EXAM OF LOWER LEG: CPT

## 2023-09-30 PROCEDURE — 85379 FIBRIN DEGRADATION QUANT: CPT

## 2023-09-30 PROCEDURE — 99284 EMERGENCY DEPT VISIT MOD MDM: CPT

## 2023-09-30 PROCEDURE — 85025 COMPLETE CBC W/AUTO DIFF WBC: CPT

## 2023-09-30 PROCEDURE — 73562 X-RAY EXAM OF KNEE 3: CPT

## 2023-09-30 PROCEDURE — 82550 ASSAY OF CK (CPK): CPT

## 2023-09-30 PROCEDURE — 80048 BASIC METABOLIC PNL TOTAL CA: CPT

## 2023-09-30 RX ORDER — OXYCODONE HYDROCHLORIDE AND ACETAMINOPHEN 5; 325 MG/1; MG/1
1 TABLET ORAL ONCE
Status: DISCONTINUED | OUTPATIENT
Start: 2023-09-30 | End: 2023-09-30 | Stop reason: HOSPADM

## 2023-09-30 RX ORDER — ACETAMINOPHEN 325 MG/1
650 TABLET ORAL ONCE
Status: DISCONTINUED | OUTPATIENT
Start: 2023-09-30 | End: 2023-09-30 | Stop reason: HOSPADM

## 2023-09-30 ASSESSMENT — ENCOUNTER SYMPTOMS
RHINORRHEA: 0
EYE PAIN: 0
ABDOMINAL PAIN: 0
COUGH: 0
VOMITING: 0
WHEEZING: 0
FACIAL SWELLING: 0
DIARRHEA: 0
PHOTOPHOBIA: 0
CHEST TIGHTNESS: 0
SORE THROAT: 0
EYE ITCHING: 0
EYE DISCHARGE: 0
SHORTNESS OF BREATH: 0
NAUSEA: 0
BACK PAIN: 0

## 2023-09-30 ASSESSMENT — PAIN DESCRIPTION - ORIENTATION: ORIENTATION: LEFT

## 2023-09-30 ASSESSMENT — PAIN DESCRIPTION - DESCRIPTORS: DESCRIPTORS: ACHING

## 2023-09-30 ASSESSMENT — LIFESTYLE VARIABLES
HOW MANY STANDARD DRINKS CONTAINING ALCOHOL DO YOU HAVE ON A TYPICAL DAY: PATIENT DOES NOT DRINK
HOW OFTEN DO YOU HAVE A DRINK CONTAINING ALCOHOL: NEVER

## 2023-09-30 ASSESSMENT — PAIN DESCRIPTION - FREQUENCY: FREQUENCY: CONTINUOUS

## 2023-09-30 ASSESSMENT — PAIN - FUNCTIONAL ASSESSMENT: PAIN_FUNCTIONAL_ASSESSMENT: PREVENTS OR INTERFERES SOME ACTIVE ACTIVITIES AND ADLS

## 2023-09-30 ASSESSMENT — PAIN DESCRIPTION - LOCATION: LOCATION: LEG

## 2023-09-30 ASSESSMENT — PAIN SCALES - GENERAL: PAINLEVEL_OUTOF10: 4

## 2023-09-30 ASSESSMENT — PAIN DESCRIPTION - PAIN TYPE: TYPE: ACUTE PAIN

## 2023-09-30 NOTE — DISCHARGE INSTRUCTIONS
All of your testing is normal here. If you continue to have discomfort follow-up with your regular doctor.

## 2023-09-30 NOTE — ED NOTES
Reviewed discharge instructions, medication reconciliation, and patient education information with patient. Patient stated understanding, and answered questions to satisfaction. Patient verbalized satisfaction of care. PIV removed at this time. Patient ambulated safely to exit with a steady gait in no obvious acute distress. Patient verbalized understanding of returning to ER if symptoms worsen, and to follow up with primary health care provider.       Henry Cooper RN  09/30/23 [id number]

## 2023-10-04 ENCOUNTER — NURSE ONLY (OUTPATIENT)
Dept: PRIMARY CARE CLINIC | Age: 78
End: 2023-10-04
Payer: MEDICARE

## 2023-10-04 DIAGNOSIS — Z23 FLU VACCINE NEED: Primary | ICD-10-CM

## 2023-10-04 PROCEDURE — G0008 ADMIN INFLUENZA VIRUS VAC: HCPCS | Performed by: FAMILY MEDICINE

## 2023-10-04 PROCEDURE — 90694 VACC AIIV4 NO PRSRV 0.5ML IM: CPT | Performed by: FAMILY MEDICINE

## 2023-10-20 RX ORDER — EMPAGLIFLOZIN 25 MG/1
TABLET, FILM COATED ORAL
Qty: 30 TABLET | Refills: 11 | OUTPATIENT
Start: 2023-10-20

## 2023-10-23 ENCOUNTER — OFFICE VISIT (OUTPATIENT)
Dept: ENDOCRINOLOGY | Age: 78
End: 2023-10-23
Payer: MEDICARE

## 2023-10-23 VITALS
RESPIRATION RATE: 15 BRPM | HEART RATE: 64 BPM | OXYGEN SATURATION: 98 % | TEMPERATURE: 98 F | DIASTOLIC BLOOD PRESSURE: 67 MMHG | SYSTOLIC BLOOD PRESSURE: 125 MMHG | HEIGHT: 63 IN | BODY MASS INDEX: 23.81 KG/M2 | WEIGHT: 134.4 LBS

## 2023-10-23 DIAGNOSIS — E78.2 MIXED HYPERLIPIDEMIA: ICD-10-CM

## 2023-10-23 DIAGNOSIS — E11.65 UNCONTROLLED TYPE 2 DIABETES MELLITUS WITH HYPERGLYCEMIA (HCC): Primary | ICD-10-CM

## 2023-10-23 LAB — HBA1C MFR BLD: 8.9 %

## 2023-10-23 PROCEDURE — 83036 HEMOGLOBIN GLYCOSYLATED A1C: CPT | Performed by: INTERNAL MEDICINE

## 2023-10-23 PROCEDURE — G8484 FLU IMMUNIZE NO ADMIN: HCPCS | Performed by: INTERNAL MEDICINE

## 2023-10-23 PROCEDURE — G8420 CALC BMI NORM PARAMETERS: HCPCS | Performed by: INTERNAL MEDICINE

## 2023-10-23 PROCEDURE — 1090F PRES/ABSN URINE INCON ASSESS: CPT | Performed by: INTERNAL MEDICINE

## 2023-10-23 PROCEDURE — 3078F DIAST BP <80 MM HG: CPT | Performed by: INTERNAL MEDICINE

## 2023-10-23 PROCEDURE — 99214 OFFICE O/P EST MOD 30 MIN: CPT | Performed by: INTERNAL MEDICINE

## 2023-10-23 PROCEDURE — 1123F ACP DISCUSS/DSCN MKR DOCD: CPT | Performed by: INTERNAL MEDICINE

## 2023-10-23 PROCEDURE — 3074F SYST BP LT 130 MM HG: CPT | Performed by: INTERNAL MEDICINE

## 2023-10-23 PROCEDURE — G8427 DOCREV CUR MEDS BY ELIG CLIN: HCPCS | Performed by: INTERNAL MEDICINE

## 2023-10-23 PROCEDURE — 3052F HG A1C>EQUAL 8.0%<EQUAL 9.0%: CPT | Performed by: INTERNAL MEDICINE

## 2023-10-23 PROCEDURE — 1036F TOBACCO NON-USER: CPT | Performed by: INTERNAL MEDICINE

## 2023-10-23 PROCEDURE — G8400 PT W/DXA NO RESULTS DOC: HCPCS | Performed by: INTERNAL MEDICINE

## 2023-10-23 RX ORDER — GLIPIZIDE 10 MG/1
10 TABLET, FILM COATED, EXTENDED RELEASE ORAL DAILY
Qty: 30 TABLET | Refills: 3 | Status: SHIPPED | OUTPATIENT
Start: 2023-10-23 | End: 2024-10-22

## 2023-10-23 RX ORDER — LIRAGLUTIDE 6 MG/ML
INJECTION SUBCUTANEOUS
Qty: 9 ML | Refills: 11 | Status: SHIPPED | OUTPATIENT
Start: 2023-10-23

## 2023-10-23 RX ORDER — INSULIN GLARGINE 100 [IU]/ML
INJECTION, SOLUTION SUBCUTANEOUS
Qty: 45 ML | Refills: 11 | Status: SHIPPED | OUTPATIENT
Start: 2023-10-23

## 2023-10-23 NOTE — PROGRESS NOTES
Seen as f/u      Interim:    Brought log    Decreasing metformin helped diarrhea  Does eat more CHO during stress   dementia  Thinks handling changes will be difficult    Diagnosed with Type 2 diabetes mellitus in 2006  Known diabetic complications: Neuropathy    Current diabetic medications   Metformin 500mg ER daily  Jardiance  25mg  Victoza  1.8  lantus 55    h/o use of sulfonylurea/ TZD      Last A1c 8.0%<----8.1%<------7.3%<-----7.8%<------ 7.1%,-----7.4 %<-----7.1% <------  6.3%<------ 7.5%<------ 8.5%<------7.6%<------- 9%<------9.8 on 1/18 <--- 10.2 <--- 9.1    Prior visit with dietician: Yes   Current diet: on average, 3 meals per day   Current exercise: walking   Current monitoring regimen: home blood tests -1  times daily     Has brought blood glucose log/meter:yes  Home blood sugar records:   Any episodes of hypoglycemia? 58    No Hx of CAD , PVD, CVA    Hyperlipidemia: controlled, taking statin  LDL 36 on 1/18   crestor 40mg fenofibrate 160mg  LDL 42 on 4/19  LDL 57 on 7/20  LDL 64  Last eye exam: 3/23  Last foot exam: 1/21 by poiatrist  Last microalbumin to creatinine ratio: 3/23    HTN: Controlled, on  lisinopril 5mg    States hair loss for last 2 years    1/18 Testosterone < 3  DHEA-S 20  TSH 1.04    Calcium high in the past but normal since 2011 1/18 Ca 9.8  7/11  Ca 11.0  6/09 Ca 10.9        Past Medical History:   Diagnosis Date    Anesthesia     hx of reaction to medications can have \"opposite\" effect with sedatives and causes patient to be jittery, agitated. Patient has concern what will be given.      Arthritis     Glaucoma     Hyperlipidemia     Hypertension     Type II or unspecified type diabetes mellitus without mention of complication, not stated as uncontrolled      Past Surgical History:   Procedure Laterality Date    BREAST BIOPSY Left     benign excisional biopsy    CARPAL TUNNEL RELEASE      bilateral    COLONOSCOPY  5/2013    HYSTERECTOMY (CERVIX STATUS UNKNOWN)

## 2023-10-24 RX ORDER — FENOFIBRATE 160 MG/1
TABLET ORAL
Qty: 90 TABLET | Refills: 2 | Status: SHIPPED | OUTPATIENT
Start: 2023-10-24

## 2023-10-28 SDOH — ECONOMIC STABILITY: FOOD INSECURITY: WITHIN THE PAST 12 MONTHS, THE FOOD YOU BOUGHT JUST DIDN'T LAST AND YOU DIDN'T HAVE MONEY TO GET MORE.: NEVER TRUE

## 2023-10-28 SDOH — ECONOMIC STABILITY: HOUSING INSECURITY
IN THE LAST 12 MONTHS, WAS THERE A TIME WHEN YOU DID NOT HAVE A STEADY PLACE TO SLEEP OR SLEPT IN A SHELTER (INCLUDING NOW)?: NO

## 2023-10-28 SDOH — HEALTH STABILITY: PHYSICAL HEALTH: ON AVERAGE, HOW MANY DAYS PER WEEK DO YOU ENGAGE IN MODERATE TO STRENUOUS EXERCISE (LIKE A BRISK WALK)?: 3 DAYS

## 2023-10-28 SDOH — HEALTH STABILITY: PHYSICAL HEALTH: ON AVERAGE, HOW MANY MINUTES DO YOU ENGAGE IN EXERCISE AT THIS LEVEL?: 40 MIN

## 2023-10-28 SDOH — ECONOMIC STABILITY: INCOME INSECURITY: HOW HARD IS IT FOR YOU TO PAY FOR THE VERY BASICS LIKE FOOD, HOUSING, MEDICAL CARE, AND HEATING?: NOT HARD AT ALL

## 2023-10-28 SDOH — ECONOMIC STABILITY: TRANSPORTATION INSECURITY
IN THE PAST 12 MONTHS, HAS LACK OF TRANSPORTATION KEPT YOU FROM MEETINGS, WORK, OR FROM GETTING THINGS NEEDED FOR DAILY LIVING?: NO

## 2023-10-28 SDOH — ECONOMIC STABILITY: FOOD INSECURITY: WITHIN THE PAST 12 MONTHS, YOU WORRIED THAT YOUR FOOD WOULD RUN OUT BEFORE YOU GOT MONEY TO BUY MORE.: NEVER TRUE

## 2023-10-28 ASSESSMENT — PATIENT HEALTH QUESTIONNAIRE - PHQ9
SUM OF ALL RESPONSES TO PHQ QUESTIONS 1-9: 0
SUM OF ALL RESPONSES TO PHQ9 QUESTIONS 1 & 2: 0
SUM OF ALL RESPONSES TO PHQ QUESTIONS 1-9: 0
2. FEELING DOWN, DEPRESSED OR HOPELESS: 0
1. LITTLE INTEREST OR PLEASURE IN DOING THINGS: 0
SUM OF ALL RESPONSES TO PHQ QUESTIONS 1-9: 0
SUM OF ALL RESPONSES TO PHQ QUESTIONS 1-9: 0

## 2023-10-28 ASSESSMENT — LIFESTYLE VARIABLES
HOW OFTEN DO YOU HAVE SIX OR MORE DRINKS ON ONE OCCASION: 1
HOW OFTEN DO YOU HAVE A DRINK CONTAINING ALCOHOL: MONTHLY OR LESS
HOW OFTEN DO YOU HAVE A DRINK CONTAINING ALCOHOL: 2
HOW MANY STANDARD DRINKS CONTAINING ALCOHOL DO YOU HAVE ON A TYPICAL DAY: 1 OR 2
HOW MANY STANDARD DRINKS CONTAINING ALCOHOL DO YOU HAVE ON A TYPICAL DAY: 1

## 2023-10-31 ENCOUNTER — OFFICE VISIT (OUTPATIENT)
Dept: PRIMARY CARE CLINIC | Age: 78
End: 2023-10-31
Payer: MEDICARE

## 2023-10-31 VITALS
HEIGHT: 64 IN | OXYGEN SATURATION: 98 % | DIASTOLIC BLOOD PRESSURE: 69 MMHG | SYSTOLIC BLOOD PRESSURE: 118 MMHG | HEART RATE: 76 BPM | TEMPERATURE: 97.6 F | BODY MASS INDEX: 22.91 KG/M2 | WEIGHT: 134.2 LBS

## 2023-10-31 DIAGNOSIS — Z00.00 MEDICARE ANNUAL WELLNESS VISIT, SUBSEQUENT: Primary | ICD-10-CM

## 2023-10-31 PROCEDURE — 3074F SYST BP LT 130 MM HG: CPT | Performed by: FAMILY MEDICINE

## 2023-10-31 PROCEDURE — G8484 FLU IMMUNIZE NO ADMIN: HCPCS | Performed by: FAMILY MEDICINE

## 2023-10-31 PROCEDURE — G0439 PPPS, SUBSEQ VISIT: HCPCS | Performed by: FAMILY MEDICINE

## 2023-10-31 PROCEDURE — 3078F DIAST BP <80 MM HG: CPT | Performed by: FAMILY MEDICINE

## 2023-10-31 PROCEDURE — 1123F ACP DISCUSS/DSCN MKR DOCD: CPT | Performed by: FAMILY MEDICINE

## 2023-10-31 NOTE — PROGRESS NOTES
Erythromycin Nausea And Vomiting     Prior to Visit Medications    Medication Sig Taking? Authorizing Provider   JARDIANCE 25 MG tablet TAKE ONE TABLET BY MOUTH DAILY Yes Dickson, [address] [place] [address], DO   lisinopril (PRINIVIL;ZESTRIL) 5 MG tablet TAKE ONE TABLET DAILY Yes Pili Forman DO   fenofibrate (TRIGLIDE) 160 MG tablet TAKE ONE TABLET BY MOUTH DAILY Yes Pili Forman,    LANTUS SOLOSTAR 100 UNIT/ML injection pen INJECT 55 UNITS SUBCUTANEOUSLY DAILY Yes Desmond Valdez MD   Liraglutide (VICTOZA) 18 MG/3ML SOPN SC injection INJECT 1.8MG SUBCUTANEOUSLY DAILY Yes Jaden Sanon MD   Continuous Blood Gluc Sensor (FREESTYLE EDSON 2 SENSOR) MISC Every 2 weeks Yes Desmond Valdez MD   Continuous Blood Gluc  (FREESTYLE EDSON 2 READER) MARIA ELENA daily Yes Jaden Sanon MD   glipiZIDE (GLUCOTROL XL) 10 MG extended release tablet Take 1 tablet by mouth daily Yes Jaden Sanon MD   ADVOCATE INSULIN PEN NEEDLES 31G X 5 MM MISC USE WITH LANTUS SOLOSTAR PENS Yes Jaden Sanon MD   rosuvastatin (CRESTOR) 40 MG tablet TAKE ONE TABLET BY MOUTH EACH EVENING Yes Pili Forman, DO   ONETOUCH VERIO strip 1 EACH BY IN VITRO ROUTE DAILY AS NEEDED.  Yes Jaden Sanon MD   metFORMIN (GLUCOPHAGE-XR) 500 MG extended release tablet TAKE ONE TABLET BY MOUTH Yes Desmond Valdez MD   Glucosamine HCl (GLUCOSAMINE PO) Take by mouth Yes Job Marquez MD   COMBIGAN 0.2-0.5 % ophthalmic solution  Yes Job Marquez MD   Biotin 94189 MCG TABS Take by mouth Yes Job Marquez MD   timolol (TIMOPTIC) 0.25 % ophthalmic solution 1 drop 2 times daily Yes Job Marquez MD   bimatoprost (LUMIGAN) 0.01 % SOLN ophthalmic drops Place 1 drop into both eyes nightly Yes Job Marquez MD   Lancets MISC Use to check sugar once daily MAY SUBSTITUTE PER INSURANCE Yes Rodrick Rothman MD   Insulin Pen Needle 32G X 4 MM MISC 1 each by Does not apply

## 2023-11-11 DIAGNOSIS — E78.2 MIXED HYPERLIPIDEMIA: ICD-10-CM

## 2023-11-13 RX ORDER — ROSUVASTATIN CALCIUM 40 MG/1
TABLET, COATED ORAL
Qty: 90 TABLET | Refills: 3 | Status: SHIPPED | OUTPATIENT
Start: 2023-11-13

## 2023-11-13 RX ORDER — BLOOD SUGAR DIAGNOSTIC
STRIP MISCELLANEOUS
Qty: 200 EACH | Refills: 5 | Status: SHIPPED | OUTPATIENT
Start: 2023-11-13

## 2023-11-13 NOTE — TELEPHONE ENCOUNTER
Medication:   Requested Prescriptions     Pending Prescriptions Disp Refills    rosuvastatin (CRESTOR) 40 MG tablet [Pharmacy Med Name: ROSUVASTATIN CALCIUM 40 MG 40 Tablet] 90 tablet 3     Sig: TAKE ONE TABLET BY MOUTH EACH EVENING     Last Filled:  11/11/23    Last appt: 10/31/2023   Next appt: 11/12/2023

## 2023-11-13 NOTE — TELEPHONE ENCOUNTER
Medication:   Requested Prescriptions     Pending Prescriptions Disp Refills    ADVOCATE INSULIN PEN NEEDLES 31G X 5 MM MISC [Pharmacy Med Name: ADVOCATE PEN NDL 5MM 31G 31G X 5 MM Miscellaneous]  10     Sig: USE WITH LANTUS SOLOSTAR PENS     Last Filled:  11/11/23    Last appt: 10/31/2023   Next appt: Visit date not found

## 2024-01-06 DIAGNOSIS — E11.65 UNCONTROLLED TYPE 2 DIABETES MELLITUS WITH HYPERGLYCEMIA (HCC): ICD-10-CM

## 2024-01-08 RX ORDER — BLOOD SUGAR DIAGNOSTIC
STRIP MISCELLANEOUS
Qty: 100 STRIP | Refills: 2 | Status: SHIPPED | OUTPATIENT
Start: 2024-01-08

## 2024-01-08 NOTE — TELEPHONE ENCOUNTER
Medication:   Requested Prescriptions     Pending Prescriptions Disp Refills    ONETOUCH VERIO strip [Pharmacy Med Name: ONETOUCH VERIO TEST STRIP] 100 strip 2     Sig: USE ONE STRIP TO TEST DAILY AS NEEDED       Last Filled:      Patient Phone Number: [phone] (home)     Last appt: 10/23/2023   Next appt: 2/6/2024    Last Labs DM:   Lab Results   Component Value Date/Time    LABA1C 8.9 10/23/2023 04:02 PM

## 2024-02-01 RX ORDER — GLIPIZIDE 10 MG/1
10 TABLET, FILM COATED, EXTENDED RELEASE ORAL DAILY
Qty: 90 TABLET | Refills: 3 | Status: SHIPPED | OUTPATIENT
Start: 2024-02-01

## 2024-02-01 NOTE — TELEPHONE ENCOUNTER
Medication:   Requested Prescriptions     Pending Prescriptions Disp Refills    glipiZIDE (GLUCOTROL XL) 10 MG extended release tablet [Pharmacy Med Name: GLIPIZIDE ER 10 MG TB24 10 Tablet] 90 tablet 3     Sig: TAKE ONE TABLET DAILY       Last Filled:      Patient Phone Number: [phone] (home)     Last appt: 10/23/2023   Next appt: 2/6/2024    Last Labs DM:   Lab Results   Component Value Date/Time    LABA1C 8.9 10/23/2023 04:02 PM

## 2024-02-06 ENCOUNTER — OFFICE VISIT (OUTPATIENT)
Dept: ENDOCRINOLOGY | Age: 79
End: 2024-02-06
Payer: MEDICARE

## 2024-02-06 VITALS
HEIGHT: 64 IN | WEIGHT: 139 LBS | DIASTOLIC BLOOD PRESSURE: 78 MMHG | RESPIRATION RATE: 15 BRPM | HEART RATE: 79 BPM | OXYGEN SATURATION: 99 % | SYSTOLIC BLOOD PRESSURE: 125 MMHG | TEMPERATURE: 98 F | BODY MASS INDEX: 23.73 KG/M2

## 2024-02-06 DIAGNOSIS — I10 PRIMARY HYPERTENSION: ICD-10-CM

## 2024-02-06 DIAGNOSIS — E11.65 UNCONTROLLED TYPE 2 DIABETES MELLITUS WITH HYPERGLYCEMIA (HCC): Primary | ICD-10-CM

## 2024-02-06 PROBLEM — N18.30 CHRONIC RENAL DISEASE, STAGE III (HCC): Status: ACTIVE | Noted: 2024-02-06

## 2024-02-06 LAB — HBA1C MFR BLD: 7.7 %

## 2024-02-06 PROCEDURE — G8427 DOCREV CUR MEDS BY ELIG CLIN: HCPCS | Performed by: INTERNAL MEDICINE

## 2024-02-06 PROCEDURE — 1090F PRES/ABSN URINE INCON ASSESS: CPT | Performed by: INTERNAL MEDICINE

## 2024-02-06 PROCEDURE — 3078F DIAST BP <80 MM HG: CPT | Performed by: INTERNAL MEDICINE

## 2024-02-06 PROCEDURE — G8400 PT W/DXA NO RESULTS DOC: HCPCS | Performed by: INTERNAL MEDICINE

## 2024-02-06 PROCEDURE — G8420 CALC BMI NORM PARAMETERS: HCPCS | Performed by: INTERNAL MEDICINE

## 2024-02-06 PROCEDURE — 1123F ACP DISCUSS/DSCN MKR DOCD: CPT | Performed by: INTERNAL MEDICINE

## 2024-02-06 PROCEDURE — 3074F SYST BP LT 130 MM HG: CPT | Performed by: INTERNAL MEDICINE

## 2024-02-06 PROCEDURE — 99214 OFFICE O/P EST MOD 30 MIN: CPT | Performed by: INTERNAL MEDICINE

## 2024-02-06 PROCEDURE — 83036 HEMOGLOBIN GLYCOSYLATED A1C: CPT | Performed by: INTERNAL MEDICINE

## 2024-02-06 PROCEDURE — G8484 FLU IMMUNIZE NO ADMIN: HCPCS | Performed by: INTERNAL MEDICINE

## 2024-02-06 PROCEDURE — 1036F TOBACCO NON-USER: CPT | Performed by: INTERNAL MEDICINE

## 2024-02-06 NOTE — PROGRESS NOTES
Seen as f/u      Interim:    Did not start CGM yet  Prefers fingerstick    Decreasing metformin helped diarrhea  Does eat more CHO during stress   dementia  Thinks handling changes will be difficult    Diagnosed with Type 2 diabetes mellitus in 2006  Known diabetic complications: Neuropathy    Current diabetic medications   Metformin 500mg ER daily  Jardiance  25mg  Victoza  1.8  lantus 58  Glipizide 10mg      h/o use of sulfonylurea/ TZD      Last A1c 7.7%<---- 8.0%<----8.1%<------7.3%<-----7.8%<------ 7.1%,-----7.4 %<-----7.1% <------  6.3%<------ 7.5%<------ 8.5%<------7.6%<------- 9%<------9.8 on 1/18 <--- 10.2 <--- 9.1    Prior visit with dietician: Yes   Current diet: on average, 3 meals per day   Current exercise: walking   Current monitoring regimen: home blood tests -1  times daily     Has brought blood glucose log/meter:yes  Home blood sugar records:   Any episodes of hypoglycemia? 58    No Hx of CAD , PVD, CVA    Hyperlipidemia: controlled, taking statin  LDL 36 on 1/18   crestor 40mg fenofibrate 160mg  LDL 42 on 4/19  LDL 57 on 7/20  LDL 64  Last eye exam: 3/23  Last foot exam: 1/21 by poiatrist  Last microalbumin to creatinine ratio: 3/23    HTN: Controlled, on  lisinopril 5mg    States hair loss for last 2 years    1/18 Testosterone < 3  DHEA-S 20  TSH 1.04    Calcium high in the past but normal since 2011 1/18 Ca 9.8  7/11  Ca 11.0  6/09 Ca 10.9        Past Medical History:   Diagnosis Date    Anesthesia     hx of reaction to medications can have \"opposite\" effect with sedatives and causes patient to be jittery, agitated.  Patient has concern what will be given.     Arthritis     Glaucoma     Hyperlipidemia     Hypertension     Type II or unspecified type diabetes mellitus without mention of complication, not stated as uncontrolled      Past Surgical History:   Procedure Laterality Date    BREAST BIOPSY Left     benign excisional biopsy    CARPAL TUNNEL RELEASE      bilateral

## 2024-02-12 RX ORDER — METFORMIN HYDROCHLORIDE 500 MG/1
TABLET, EXTENDED RELEASE ORAL
Qty: 90 TABLET | Refills: 3 | Status: SHIPPED | OUTPATIENT
Start: 2024-02-12

## 2024-02-12 NOTE — TELEPHONE ENCOUNTER
Problem: Patient Care Overview  Goal: Plan of Care/Patient Progress Review  Outcome: Improving  DOS at 1200. Spinal and PNB. A/Ox4. Ax1, walker, GB. IvSL. HVC. Oxy and Dilaudid for pain. Continue to monitor.        Requested Prescriptions     Pending Prescriptions Disp Refills    metFORMIN (GLUCOPHAGE-XR) 500 MG extended release tablet [Pharmacy Med Name: METFORMIN HCL  MG TB2 500 Tablet] 90 tablet 3     Sig: TAKE ONE TABLET BY MOUTH DAILY WITH FOOD     Continue metformin once a day     NOV 06/10/2024

## 2024-02-14 RX ORDER — LISINOPRIL 5 MG/1
TABLET ORAL
Qty: 90 TABLET | Refills: 1 | Status: SHIPPED | OUTPATIENT
Start: 2024-02-14

## 2024-02-14 NOTE — TELEPHONE ENCOUNTER
Medication:   Requested Prescriptions     Pending Prescriptions Disp Refills    lisinopril (PRINIVIL;ZESTRIL) 5 MG tablet [Pharmacy Med Name: LISINOPRIL 5 MG TABS 5 Tablet] 90 tablet 1     Sig: TAKE ONE TABLET DAILY     Last Filled:  2/9/24    Last appt: 10/31/2023   Next appt: Visit date not found

## 2024-03-26 ENCOUNTER — TELEPHONE (OUTPATIENT)
Dept: ENDOCRINOLOGY | Age: 79
End: 2024-03-26

## 2024-03-26 DIAGNOSIS — E11.65 UNCONTROLLED TYPE 2 DIABETES MELLITUS WITH HYPERGLYCEMIA (HCC): Primary | ICD-10-CM

## 2024-03-26 RX ORDER — LIRAGLUTIDE 6 MG/ML
INJECTION SUBCUTANEOUS
Qty: 9 ML | Refills: 11 | Status: SHIPPED | OUTPATIENT
Start: 2024-03-26

## 2024-03-26 NOTE — TELEPHONE ENCOUNTER
Patient needs a refill for Victoza.     Patient received a letter in the mail stating that Victoza will need a PA starting May 1st. Is it too early to submit this request?    Patient instructed that if PA cannot be submitted yet we will inform her and she will call back towards the end of April to initiate request.

## 2024-03-27 NOTE — TELEPHONE ENCOUNTER
Yes it is too early if it is not in effect until May 1st. If I submit it now it will say its covered. I will not be able to submit it until the morning of May 1st.  Thanks!

## 2024-04-22 ENCOUNTER — OFFICE VISIT (OUTPATIENT)
Dept: PRIMARY CARE CLINIC | Age: 79
End: 2024-04-22
Payer: MEDICARE

## 2024-04-22 VITALS
HEART RATE: 66 BPM | DIASTOLIC BLOOD PRESSURE: 85 MMHG | WEIGHT: 135.6 LBS | SYSTOLIC BLOOD PRESSURE: 132 MMHG | OXYGEN SATURATION: 97 % | BODY MASS INDEX: 23.28 KG/M2

## 2024-04-22 DIAGNOSIS — F41.9 ANXIETY: ICD-10-CM

## 2024-04-22 DIAGNOSIS — E11.29 CONTROLLED TYPE 2 DIABETES MELLITUS WITH MICROALBUMINURIA, WITH LONG-TERM CURRENT USE OF INSULIN (HCC): ICD-10-CM

## 2024-04-22 DIAGNOSIS — N18.30 STAGE 3 CHRONIC KIDNEY DISEASE, UNSPECIFIED WHETHER STAGE 3A OR 3B CKD (HCC): ICD-10-CM

## 2024-04-22 DIAGNOSIS — Z63.6 CAREGIVER STRESS: Primary | ICD-10-CM

## 2024-04-22 DIAGNOSIS — R80.9 CONTROLLED TYPE 2 DIABETES MELLITUS WITH MICROALBUMINURIA, WITH LONG-TERM CURRENT USE OF INSULIN (HCC): ICD-10-CM

## 2024-04-22 DIAGNOSIS — Z79.4 CONTROLLED TYPE 2 DIABETES MELLITUS WITH MICROALBUMINURIA, WITH LONG-TERM CURRENT USE OF INSULIN (HCC): ICD-10-CM

## 2024-04-22 PROCEDURE — 99214 OFFICE O/P EST MOD 30 MIN: CPT | Performed by: FAMILY MEDICINE

## 2024-04-22 PROCEDURE — 3051F HG A1C>EQUAL 7.0%<8.0%: CPT | Performed by: FAMILY MEDICINE

## 2024-04-22 PROCEDURE — 1090F PRES/ABSN URINE INCON ASSESS: CPT | Performed by: FAMILY MEDICINE

## 2024-04-22 PROCEDURE — G8400 PT W/DXA NO RESULTS DOC: HCPCS | Performed by: FAMILY MEDICINE

## 2024-04-22 PROCEDURE — 1123F ACP DISCUSS/DSCN MKR DOCD: CPT | Performed by: FAMILY MEDICINE

## 2024-04-22 PROCEDURE — G8420 CALC BMI NORM PARAMETERS: HCPCS | Performed by: FAMILY MEDICINE

## 2024-04-22 PROCEDURE — 3079F DIAST BP 80-89 MM HG: CPT | Performed by: FAMILY MEDICINE

## 2024-04-22 PROCEDURE — 3075F SYST BP GE 130 - 139MM HG: CPT | Performed by: FAMILY MEDICINE

## 2024-04-22 PROCEDURE — 1036F TOBACCO NON-USER: CPT | Performed by: FAMILY MEDICINE

## 2024-04-22 PROCEDURE — G8428 CUR MEDS NOT DOCUMENT: HCPCS | Performed by: FAMILY MEDICINE

## 2024-04-22 RX ORDER — SERTRALINE HYDROCHLORIDE 25 MG/1
25 TABLET, FILM COATED ORAL DAILY
COMMUNITY
Start: 2024-02-26 | End: 2024-04-22 | Stop reason: SDUPTHER

## 2024-04-22 SDOH — SOCIAL STABILITY - SOCIAL INSECURITY: DEPENDENT RELATIVE NEEDING CARE AT HOME: Z63.6

## 2024-04-22 ASSESSMENT — PATIENT HEALTH QUESTIONNAIRE - PHQ9
SUM OF ALL RESPONSES TO PHQ9 QUESTIONS 1 & 2: 3
8. MOVING OR SPEAKING SO SLOWLY THAT OTHER PEOPLE COULD HAVE NOTICED. OR THE OPPOSITE, BEING SO FIGETY OR RESTLESS THAT YOU HAVE BEEN MOVING AROUND A LOT MORE THAN USUAL: NOT AT ALL
9. THOUGHTS THAT YOU WOULD BE BETTER OFF DEAD, OR OF HURTING YOURSELF: NOT AT ALL
SUM OF ALL RESPONSES TO PHQ QUESTIONS 1-9: 7
6. FEELING BAD ABOUT YOURSELF - OR THAT YOU ARE A FAILURE OR HAVE LET YOURSELF OR YOUR FAMILY DOWN: SEVERAL DAYS
SUM OF ALL RESPONSES TO PHQ QUESTIONS 1-9: 7
3. TROUBLE FALLING OR STAYING ASLEEP: NOT AT ALL
7. TROUBLE CONCENTRATING ON THINGS, SUCH AS READING THE NEWSPAPER OR WATCHING TELEVISION: NEARLY EVERY DAY
4. FEELING TIRED OR HAVING LITTLE ENERGY: NOT AT ALL
SUM OF ALL RESPONSES TO PHQ QUESTIONS 1-9: 7
1. LITTLE INTEREST OR PLEASURE IN DOING THINGS: NOT AT ALL
2. FEELING DOWN, DEPRESSED OR HOPELESS: NEARLY EVERY DAY
SUM OF ALL RESPONSES TO PHQ QUESTIONS 1-9: 7
10. IF YOU CHECKED OFF ANY PROBLEMS, HOW DIFFICULT HAVE THESE PROBLEMS MADE IT FOR YOU TO DO YOUR WORK, TAKE CARE OF THINGS AT HOME, OR GET ALONG WITH OTHER PEOPLE: SOMEWHAT DIFFICULT
5. POOR APPETITE OR OVEREATING: NOT AT ALL

## 2024-04-22 NOTE — PROGRESS NOTES
Glaucoma     Hyperlipidemia     Hypertension     Type II or unspecified type diabetes mellitus without mention of complication, not stated as uncontrolled         Social History     Tobacco Use    Smoking status: Former     Current packs/day: 0.00     Average packs/day: 0.3 packs/day for 51.0 years (12.8 ttl pk-yrs)     Types: Cigarettes     Start date: 3/9/1965     Quit date: 3/9/2016     Years since quittin.1    Smokeless tobacco: Never    Tobacco comments:     intermittent smoking over last 51 yrs.  Occas has a cigarette now. Quit routine smoking    Vaping Use    Vaping Use: Never used   Substance Use Topics    Alcohol use: Yes     Alcohol/week: 0.0 standard drinks of alcohol     Comment: Occas. x 2-3/ month    Drug use: No        Past Surgical History:   Procedure Laterality Date    BREAST BIOPSY Left     benign excisional biopsy    CARPAL TUNNEL RELEASE      bilateral    COLONOSCOPY  2013    HYSTERECTOMY (CERVIX STATUS UNKNOWN)      partial    MASTECTOMY, PARTIAL Left 2013    LEFT BREAST NEEDLE LOCALIZED PARTIAL MASTECTOMY EXCISIONAL    OTHER SURGICAL HISTORY      medications for sedation can make her jittery    TUBAL LIGATION          Allergies   Allergen Reactions    Benadryl [Diphenhydramine]     Erythromycin Nausea And Vomiting        Family History   Problem Relation Age of Onset    High Blood Pressure Mother     High Cholesterol Mother     Depression Mother     High Blood Pressure Brother     High Cholesterol Brother     Diabetes Maternal Grandmother         Patient's past medical history, surgical history, family history, medications, and allergies  were all reviewed and updated as appropriate today.    Review of Systems   Psychiatric/Behavioral:  Positive for decreased concentration and dysphoric mood. Negative for hallucinations, self-injury, sleep disturbance and suicidal ideas. The patient is nervous/anxious. The patient is not hyperactive.        /85   Pulse 66   Wt 61.5

## 2024-05-03 DIAGNOSIS — F41.9 ANXIETY: ICD-10-CM

## 2024-05-03 DIAGNOSIS — Z63.6 CAREGIVER STRESS: ICD-10-CM

## 2024-05-03 SDOH — SOCIAL STABILITY - SOCIAL INSECURITY: DEPENDENT RELATIVE NEEDING CARE AT HOME: Z63.6

## 2024-06-10 ENCOUNTER — TELEPHONE (OUTPATIENT)
Dept: ENDOCRINOLOGY | Age: 79
End: 2024-06-10

## 2024-06-10 ENCOUNTER — OFFICE VISIT (OUTPATIENT)
Dept: ENDOCRINOLOGY | Age: 79
End: 2024-06-10
Payer: MEDICARE

## 2024-06-10 VITALS
WEIGHT: 142 LBS | HEIGHT: 64 IN | RESPIRATION RATE: 14 BRPM | DIASTOLIC BLOOD PRESSURE: 79 MMHG | BODY MASS INDEX: 24.24 KG/M2 | HEART RATE: 84 BPM | SYSTOLIC BLOOD PRESSURE: 132 MMHG | OXYGEN SATURATION: 97 %

## 2024-06-10 DIAGNOSIS — E11.65 UNCONTROLLED TYPE 2 DIABETES MELLITUS WITH HYPERGLYCEMIA (HCC): ICD-10-CM

## 2024-06-10 DIAGNOSIS — E11.65 UNCONTROLLED TYPE 2 DIABETES MELLITUS WITH HYPERGLYCEMIA (HCC): Primary | ICD-10-CM

## 2024-06-10 LAB — HBA1C MFR BLD: 8.2 %

## 2024-06-10 PROCEDURE — 83036 HEMOGLOBIN GLYCOSYLATED A1C: CPT | Performed by: INTERNAL MEDICINE

## 2024-06-10 PROCEDURE — 3078F DIAST BP <80 MM HG: CPT | Performed by: INTERNAL MEDICINE

## 2024-06-10 PROCEDURE — 3051F HG A1C>EQUAL 7.0%<8.0%: CPT | Performed by: INTERNAL MEDICINE

## 2024-06-10 PROCEDURE — 3075F SYST BP GE 130 - 139MM HG: CPT | Performed by: INTERNAL MEDICINE

## 2024-06-10 PROCEDURE — 1090F PRES/ABSN URINE INCON ASSESS: CPT | Performed by: INTERNAL MEDICINE

## 2024-06-10 PROCEDURE — 1036F TOBACCO NON-USER: CPT | Performed by: INTERNAL MEDICINE

## 2024-06-10 PROCEDURE — G2211 COMPLEX E/M VISIT ADD ON: HCPCS | Performed by: INTERNAL MEDICINE

## 2024-06-10 PROCEDURE — 1123F ACP DISCUSS/DSCN MKR DOCD: CPT | Performed by: INTERNAL MEDICINE

## 2024-06-10 PROCEDURE — G8400 PT W/DXA NO RESULTS DOC: HCPCS | Performed by: INTERNAL MEDICINE

## 2024-06-10 PROCEDURE — G8427 DOCREV CUR MEDS BY ELIG CLIN: HCPCS | Performed by: INTERNAL MEDICINE

## 2024-06-10 PROCEDURE — 99214 OFFICE O/P EST MOD 30 MIN: CPT | Performed by: INTERNAL MEDICINE

## 2024-06-10 PROCEDURE — G8420 CALC BMI NORM PARAMETERS: HCPCS | Performed by: INTERNAL MEDICINE

## 2024-06-10 NOTE — TELEPHONE ENCOUNTER
Pharmacy called stating pt needs to seed rx to different pharmacy      Continuous Glucose Sensor (FREESTYLE EDSON 2 SENSOR) [facility] [place]     Continuous Glucose  (FREESTYLE EDSON 2 READER) MARIA ELENA Lynn     [address], [place] [address] [phone]

## 2024-06-10 NOTE — PROGRESS NOTES
Seen as f/u      Interim:    Did not start CGM yet  Prefers fingerstick  Stress eating    Decreasing metformin helped diarrhea  Does eat more CHO during stress   dementia  Thinks handling changes will be difficult    Diagnosed with Type 2 diabetes mellitus in 2006  Known diabetic complications: Neuropathy    Current diabetic medications   Metformin 500mg ER daily  Jardiance  25mg  Victoza  1.8  lantus 58  Glipizide 10mg      h/o use of sulfonylurea/ TZD      Last A1c 8.2%<---- 7.7%<---- 8.0%<----8.1%<------7.3%<-----7.8%<------ 7.1%,-----7.4 %<-----7.1% <------  6.3%<------ 7.5%<------ 8.5%<------7.6%<------- 9%<------9.8 on 1/18 <--- 10.2 <--- 9.1    Prior visit with dietician: Yes   Current diet: on average, 3 meals per day   Current exercise: walking   Current monitoring regimen: home blood tests -1  times daily     Has brought blood glucose log/meter:yes  Home blood sugar records: 105-225  Any episodes of hypoglycemia? 58    No Hx of CAD , PVD, CVA    Hyperlipidemia: controlled, taking statin  LDL 36 on 1/18   crestor 40mg fenofibrate 160mg  LDL 42 on 4/19  LDL 57 on 7/20  LDL 64  Last eye exam: 3/23  Last foot exam: 1/21 by poiatrist  Last microalbumin to creatinine ratio: 3/23    HTN: Controlled, on  lisinopril 5mg    States hair loss for last 2 years    1/18 Testosterone < 3  DHEA-S 20  TSH 1.04    Calcium high in the past but normal since 2011 1/18 Ca 9.8  7/11  Ca 11.0  6/09 Ca 10.9        Past Medical History:   Diagnosis Date    Anesthesia     hx of reaction to medications can have \"opposite\" effect with sedatives and causes patient to be jittery, agitated.  Patient has concern what will be given.     Arthritis     Glaucoma     Hyperlipidemia     Hypertension     Type II or unspecified type diabetes mellitus without mention of complication, not stated as uncontrolled      Past Surgical History:   Procedure Laterality Date    BREAST BIOPSY Left     benign excisional biopsy    CARPAL TUNNEL

## 2024-06-19 DIAGNOSIS — E11.65 UNCONTROLLED TYPE 2 DIABETES MELLITUS WITH HYPERGLYCEMIA (HCC): Primary | ICD-10-CM

## 2024-06-20 RX ORDER — INSULIN GLARGINE 100 [IU]/ML
INJECTION, SOLUTION SUBCUTANEOUS
Qty: 45 ML | Refills: 2 | Status: SHIPPED | OUTPATIENT
Start: 2024-06-20

## 2024-06-20 NOTE — TELEPHONE ENCOUNTER
Medication:   Requested Prescriptions     Pending Prescriptions Disp Refills    LANTUS SOLOSTAR 100 UNIT/ML injection pen [Pharmacy Med Name: LANTUS SOLOSTAR 100 UNITS/M 100 Solution Pen-injector] 45 mL 2     Sig: INJECT 60 UNITS SUBCUTANEOUSLY DAILY       Last Filled:      Patient Phone Number: [phone] (home)     Last appt: 6/10/2024   Next appt: 10/2/2024    Last Labs DM:   Lab Results   Component Value Date/Time    LABA1C 8.2 06/10/2024 03:30 PM

## 2024-06-24 ENCOUNTER — OFFICE VISIT (OUTPATIENT)
Dept: PRIMARY CARE CLINIC | Age: 79
End: 2024-06-24
Payer: MEDICARE

## 2024-06-24 VITALS
SYSTOLIC BLOOD PRESSURE: 124 MMHG | HEART RATE: 67 BPM | DIASTOLIC BLOOD PRESSURE: 65 MMHG | BODY MASS INDEX: 24.48 KG/M2 | WEIGHT: 142.6 LBS | OXYGEN SATURATION: 97 %

## 2024-06-24 DIAGNOSIS — I10 ESSENTIAL HYPERTENSION: Primary | ICD-10-CM

## 2024-06-24 DIAGNOSIS — Z79.4 TYPE 2 DIABETES MELLITUS WITH HYPERGLYCEMIA, WITH LONG-TERM CURRENT USE OF INSULIN (HCC): ICD-10-CM

## 2024-06-24 DIAGNOSIS — N18.30 STAGE 3 CHRONIC KIDNEY DISEASE, UNSPECIFIED WHETHER STAGE 3A OR 3B CKD (HCC): ICD-10-CM

## 2024-06-24 DIAGNOSIS — G31.84 MILD COGNITIVE IMPAIRMENT: ICD-10-CM

## 2024-06-24 DIAGNOSIS — E11.65 TYPE 2 DIABETES MELLITUS WITH HYPERGLYCEMIA, WITH LONG-TERM CURRENT USE OF INSULIN (HCC): ICD-10-CM

## 2024-06-24 PROCEDURE — 1123F ACP DISCUSS/DSCN MKR DOCD: CPT | Performed by: FAMILY MEDICINE

## 2024-06-24 PROCEDURE — 3078F DIAST BP <80 MM HG: CPT | Performed by: FAMILY MEDICINE

## 2024-06-24 PROCEDURE — 1090F PRES/ABSN URINE INCON ASSESS: CPT | Performed by: FAMILY MEDICINE

## 2024-06-24 PROCEDURE — G8427 DOCREV CUR MEDS BY ELIG CLIN: HCPCS | Performed by: FAMILY MEDICINE

## 2024-06-24 PROCEDURE — 99214 OFFICE O/P EST MOD 30 MIN: CPT | Performed by: FAMILY MEDICINE

## 2024-06-24 PROCEDURE — G8420 CALC BMI NORM PARAMETERS: HCPCS | Performed by: FAMILY MEDICINE

## 2024-06-24 PROCEDURE — 3074F SYST BP LT 130 MM HG: CPT | Performed by: FAMILY MEDICINE

## 2024-06-24 PROCEDURE — 1036F TOBACCO NON-USER: CPT | Performed by: FAMILY MEDICINE

## 2024-06-24 PROCEDURE — 3052F HG A1C>EQUAL 8.0%<EQUAL 9.0%: CPT | Performed by: FAMILY MEDICINE

## 2024-06-24 PROCEDURE — G8400 PT W/DXA NO RESULTS DOC: HCPCS | Performed by: FAMILY MEDICINE

## 2024-06-24 NOTE — PROGRESS NOTES
[facility]  [facility]  [address] [place] [address]  Dept: [phone]  Dept Fax: [phone]     6/24/2024      Tammy Card   1945     Chief Complaint   Patient presents with    Follow-up       HPI    Pt comes in today for routine follow up. T2DM/CKDIII/HTN.    Did comprehensive memory evaluation with geriatrician.     MEMORY: Patient was a good historian. She said she \"definitely sees some issues\". She had some word finding difficulty and struggled to remember [place] as being the name of the restaurant they order from often. She said recently she forgot to pack her Metformin for a 2 week vacation and didn't notice she didn't have it until she returned home and saw it in the cabinet. Patient completed the [place] Cognitive Assessment (MOCA - made for a 12th grade level of education).     26/30: Total MOCA Score    5/5: Visuospatial/Executive  3/3: Naming   Attention:  2/2: List of digits   1/1: List of letters  3/3: Serial 7 subtraction  Language:  2/2: Repeat  1/1: Fluency  2/2: Abstraction  1/5: Delayed Recall  6/6: Orientation     We increased sertraline at last visit which she has found helpful. Stress level is still high as she is caregiver for  with dementia. Son is living with them which has been helpful.       No data recorded     Prior to Visit Medications    Medication Sig Taking? Authorizing Provider   MARY PERKINSOSTAR 100 UNIT/ML injection pen INJECT 60 UNITS SUBCUTANEOUSLY DAILY Yes Ilan Galindo MD   sertraline (ZOLOFT) 50 MG tablet Take 1 tablet by mouth daily Yes Pili Forman DO   Liraglutide (VICTOZA) 18 MG/3ML SOPN SC injection INJECT 1.8MG SUBCUTANEOUSLY DAILY Yes Ilan Galindo MD   lisinopril (PRINIVIL;ZESTRIL) 5 MG tablet TAKE ONE TABLET DAILY Yes Pili Forman DO   metFORMIN (GLUCOPHAGE-XR) 500 MG extended release tablet TAKE ONE TABLET BY MOUTH DAILY WITH FOOD Yes Ilan Galindo MD

## 2024-06-26 ASSESSMENT — ENCOUNTER SYMPTOMS
SHORTNESS OF BREATH: 0
COUGH: 0

## 2024-07-05 DIAGNOSIS — E11.65 UNCONTROLLED TYPE 2 DIABETES MELLITUS WITH HYPERGLYCEMIA (HCC): Primary | ICD-10-CM

## 2024-07-05 RX ORDER — BLOOD SUGAR DIAGNOSTIC
STRIP MISCELLANEOUS
Qty: 200 EACH | Refills: 1 | Status: SHIPPED | OUTPATIENT
Start: 2024-07-05

## 2024-07-05 NOTE — TELEPHONE ENCOUNTER
Medication:   Requested Prescriptions     Pending Prescriptions Disp Refills    ADVOCATE INSULIN PEN NEEDLES 31G X 5 MM MISC [Pharmacy Med Name: ADVOCATE PEN NDL 5MM 31G 31G X 5 MM Miscellaneous] 200 each 1     Sig: USE WITH LANTUS SOLOSTAR PENS       Last Filled:      Patient Phone Number: [phone] (home)     Last appt: 6/10/2024   Next appt: 10/2/2024    Last Labs DM:   Lab Results   Component Value Date/Time    LABA1C 8.2 06/10/2024 03:30 PM

## 2024-07-11 ENCOUNTER — TELEPHONE (OUTPATIENT)
Dept: PRIMARY CARE CLINIC | Age: 79
End: 2024-07-11

## 2024-07-11 ENCOUNTER — APPOINTMENT (OUTPATIENT)
Dept: CT IMAGING | Age: 79
End: 2024-07-11
Payer: MEDICARE

## 2024-07-11 ENCOUNTER — APPOINTMENT (OUTPATIENT)
Dept: GENERAL RADIOLOGY | Age: 79
End: 2024-07-11
Payer: MEDICARE

## 2024-07-11 ENCOUNTER — HOSPITAL ENCOUNTER (EMERGENCY)
Age: 79
Discharge: HOME | End: 2024-07-11
Attending: EMERGENCY MEDICINE
Payer: MEDICARE

## 2024-07-11 VITALS
BODY MASS INDEX: 25.64 KG/M2 | RESPIRATION RATE: 19 BRPM | DIASTOLIC BLOOD PRESSURE: 75 MMHG | OXYGEN SATURATION: 97 % | TEMPERATURE: 97.6 F | SYSTOLIC BLOOD PRESSURE: 130 MMHG | HEART RATE: 76 BPM | WEIGHT: 149.4 LBS

## 2024-07-11 DIAGNOSIS — G51.0 BELL'S PALSY: Primary | ICD-10-CM

## 2024-07-11 LAB
ALBUMIN SERPL-MCNC: 4.1 G/DL (ref 3.4–5)
ALBUMIN/GLOB SERPL: 1.6 {RATIO} (ref 1.1–2.2)
ALP SERPL-CCNC: 71 U/L (ref 40–129)
ALT SERPL-CCNC: 29 U/L (ref 10–40)
ANION GAP SERPL CALCULATED.3IONS-SCNC: 11 MMOL/L (ref 3–16)
AST SERPL-CCNC: 29 U/L (ref 15–37)
BASOPHILS # BLD: 0.1 K/UL (ref 0–0.2)
BASOPHILS NFR BLD: 0.7 %
BILIRUB SERPL-MCNC: 0.3 MG/DL (ref 0–1)
BUN SERPL-MCNC: 24 MG/DL (ref 7–20)
CALCIUM SERPL-MCNC: 9.4 MG/DL (ref 8.3–10.6)
CHLORIDE SERPL-SCNC: 106 MMOL/L (ref 99–110)
CO2 SERPL-SCNC: 23 MMOL/L (ref 21–32)
CREAT SERPL-MCNC: 0.9 MG/DL (ref 0.6–1.2)
DEPRECATED RDW RBC AUTO: 14.9 % (ref 12.4–15.4)
EKG ATRIAL RATE: 67 BPM
EKG DIAGNOSIS: NORMAL
EKG P AXIS: 51 DEGREES
EKG P-R INTERVAL: 170 MS
EKG Q-T INTERVAL: 424 MS
EKG QRS DURATION: 78 MS
EKG QTC CALCULATION (BAZETT): 448 MS
EKG R AXIS: -3 DEGREES
EKG T AXIS: 61 DEGREES
EKG VENTRICULAR RATE: 67 BPM
EOSINOPHIL # BLD: 0.4 K/UL (ref 0–0.6)
EOSINOPHIL NFR BLD: 6.1 %
GFR SERPLBLD CREATININE-BSD FMLA CKD-EPI: 66 ML/MIN/{1.73_M2}
GLUCOSE SERPL-MCNC: 159 MG/DL (ref 70–99)
HCT VFR BLD AUTO: 41.4 % (ref 36–48)
HGB BLD-MCNC: 13.8 G/DL (ref 12–16)
INR PPP: 0.92 (ref 0.85–1.15)
LYMPHOCYTES # BLD: 1.8 K/UL (ref 1–5.1)
LYMPHOCYTES NFR BLD: 25 %
MCH RBC QN AUTO: 27.9 PG (ref 26–34)
MCHC RBC AUTO-ENTMCNC: 33.3 G/DL (ref 31–36)
MCV RBC AUTO: 83.9 FL (ref 80–100)
MONOCYTES # BLD: 0.5 K/UL (ref 0–1.3)
MONOCYTES NFR BLD: 6.7 %
NEUTROPHILS # BLD: 4.4 K/UL (ref 1.7–7.7)
NEUTROPHILS NFR BLD: 61.5 %
PLATELET # BLD AUTO: 172 K/UL (ref 135–450)
PMV BLD AUTO: 9.5 FL (ref 5–10.5)
POTASSIUM SERPL-SCNC: 4.1 MMOL/L (ref 3.5–5.1)
PROT SERPL-MCNC: 6.6 G/DL (ref 6.4–8.2)
PROTHROMBIN TIME: 12.6 SEC (ref 11.9–14.9)
RBC # BLD AUTO: 4.93 M/UL (ref 4–5.2)
SODIUM SERPL-SCNC: 140 MMOL/L (ref 136–145)
TROPONIN, HIGH SENSITIVITY: 25 NG/L (ref 0–14)
TROPONIN, HIGH SENSITIVITY: 26 NG/L (ref 0–14)
WBC # BLD AUTO: 7.2 K/UL (ref 4–11)

## 2024-07-11 PROCEDURE — 70498 CT ANGIOGRAPHY NECK: CPT

## 2024-07-11 PROCEDURE — 80053 COMPREHEN METABOLIC PANEL: CPT

## 2024-07-11 PROCEDURE — 71045 X-RAY EXAM CHEST 1 VIEW: CPT

## 2024-07-11 PROCEDURE — 85610 PROTHROMBIN TIME: CPT

## 2024-07-11 PROCEDURE — 84484 ASSAY OF TROPONIN QUANT: CPT

## 2024-07-11 PROCEDURE — 99285 EMERGENCY DEPT VISIT HI MDM: CPT

## 2024-07-11 PROCEDURE — 6360000004 HC RX CONTRAST MEDICATION: Performed by: EMERGENCY MEDICINE

## 2024-07-11 PROCEDURE — 85025 COMPLETE CBC W/AUTO DIFF WBC: CPT

## 2024-07-11 PROCEDURE — 93005 ELECTROCARDIOGRAM TRACING: CPT | Performed by: EMERGENCY MEDICINE

## 2024-07-11 PROCEDURE — 6370000000 HC RX 637 (ALT 250 FOR IP): Performed by: EMERGENCY MEDICINE

## 2024-07-11 PROCEDURE — 70450 CT HEAD/BRAIN W/O DYE: CPT

## 2024-07-11 RX ORDER — PREDNISONE 20 MG/1
60 TABLET ORAL DAILY
Qty: 12 TABLET | Refills: 0 | Status: SHIPPED | OUTPATIENT
Start: 2024-07-11 | End: 2024-07-15

## 2024-07-11 RX ORDER — TETRACAINE HYDROCHLORIDE 5 MG/ML
1 SOLUTION OPHTHALMIC ONCE
Status: COMPLETED | OUTPATIENT
Start: 2024-07-11 | End: 2024-07-11

## 2024-07-11 RX ORDER — OMEGA-3 FATTY ACIDS/FISH OIL 300-1000MG
1 CAPSULE ORAL 4 TIMES DAILY
Qty: 6.3 ML | Refills: 0 | Status: SHIPPED | OUTPATIENT
Start: 2024-07-11 | End: 2024-08-10

## 2024-07-11 RX ORDER — PREDNISONE 20 MG/1
60 TABLET ORAL ONCE
Status: COMPLETED | OUTPATIENT
Start: 2024-07-11 | End: 2024-07-11

## 2024-07-11 RX ORDER — VALACYCLOVIR HYDROCHLORIDE 1 G/1
1000 TABLET, FILM COATED ORAL 3 TIMES DAILY
Qty: 21 TABLET | Refills: 0 | Status: SHIPPED | OUTPATIENT
Start: 2024-07-11 | End: 2024-07-18

## 2024-07-11 RX ORDER — POLYVINYL ALCOHOL 14 MG/ML
1 SOLUTION/ DROPS OPHTHALMIC PRN
Status: DISCONTINUED | OUTPATIENT
Start: 2024-07-11 | End: 2024-07-11 | Stop reason: HOSPADM

## 2024-07-11 RX ADMIN — FLUORESCEIN SODIUM 1 MG: 1 STRIP OPHTHALMIC at 14:49

## 2024-07-11 RX ADMIN — IOPAMIDOL 75 ML: 755 INJECTION, SOLUTION INTRAVENOUS at 12:20

## 2024-07-11 RX ADMIN — TETRACAINE HYDROCHLORIDE 1 DROP: 5 SOLUTION OPHTHALMIC at 14:49

## 2024-07-11 RX ADMIN — PREDNISONE 60 MG: 20 TABLET ORAL at 15:06

## 2024-07-11 ASSESSMENT — PAIN - FUNCTIONAL ASSESSMENT: PAIN_FUNCTIONAL_ASSESSMENT: NONE - DENIES PAIN

## 2024-07-11 NOTE — DISCHARGE INSTRUCTIONS
Return to emergency department for worsening symptoms or other concerns.  Tape the left eye closed at night, and wear protective glasses or goggles if you have someplace toshia or with things blowing around as you will likely be unable to close the eye fully.  Use artificial tears at night and 3-4 or more times during the day as needed to prevent dry eye.  Follow-up with your primary care doctor.  Take the medications which we will prescribe for you.  It can cause her blood sugar to be elevated but this should be a temporary elevation and if you run into problems contact primary care doctor for further advice in the short-term.

## 2024-07-11 NOTE — ED PROVIDER NOTES
Comments: No vesicles or scabbing     Nose: Nose normal.   Eyes:      Extraocular Movements: Extraocular movements intact.      Pupils: Pupils are equal, round, and reactive to light.      Comments: Increased lacrimation left eye   Cardiovascular:      Rate and Rhythm: Normal rate and regular rhythm.   Pulmonary:      Effort: Pulmonary effort is normal. No respiratory distress.      Breath sounds: No wheezing.   Musculoskeletal:      Cervical back: Normal range of motion. No rigidity.   Neurological:      Mental Status: She is alert.      Comments: There is limited mobility of the left facial muscles including the forehead with some asymmetric movement and elevation of the left forehead, and drooping of the left eye, flattening of left nasolabial fold and difficulty moving the corner of the mouth on the left.  No sensory change.  Cranial nerves II through XII appear intact otherwise  5 out of 5 strength bilateral upper and lower extremities in all muscle groups tested, no gross sensory deficit peripherally.  Normal finger-nose testing.  No dysarthria, no aphasia                    Evin Bruno MD  07/11/24 [id number]

## 2024-07-11 NOTE — ED NOTES
All follow up care discussed. Pt verbalized understandings. No other concerns voiced. Stable and ambulatory upon dismissal.      Cb Rodriguez, RN  07/11/24 [id number]

## 2024-07-11 NOTE — TELEPHONE ENCOUNTER
Patient called with concerns of blurred vision and drooping left side of face. When speaking with patient, patient sounded like she was having a hard time putting sentences together.  No left sided weakness or dizziness at this time.  Due to patient risk factors with Hypertension and Diabetes,  would like patient to go to the ER. Informed patient to go to the ER right away.

## 2024-07-12 ENCOUNTER — CARE COORDINATION (OUTPATIENT)
Dept: CARE COORDINATION | Age: 79
End: 2024-07-12

## 2024-07-12 NOTE — CARE COORDINATION
Ambulatory Care Coordination  ED Follow up Call  Introduced role of ACM/CC; patient declines CC  Explained to patient this ACM is covering for the nurse that works with her PCP office  Provided ACM contact information  No further outreach scheduled with this ACM; episode of care resolved     Reason for ED visit:  Left side facial droop  Reviewed s/s stroke; patient denies any other symptoms  Status:     not changed  Did you call your PCP prior to going to the ED?  Not Applicable    Did you receive a discharge instructions from the Emergency Room? Yes  Review of Instructions:     Understands what to report/when to return?:  Yes   Understands discharge instructions?:  Yes   Following discharge instructions?:  Yes   Are there any new complaints of pain? No  New Pain Meds? No   Are there any other complaints/concerns that you wish to tell your provider?   No  New Medications?:   Yes    Medication Reconciliation by phone - Yes  Understands Medications?  Yes  Taking Medications? Yes  Can you swallow your pills?  Yes  Any further needs in the home i.e. Equipment?  Not Applicable  Link to services in community?:  N/A    FU appts/Provider:    Future Appointments   Date Time Provider Department Center   7/15/2024 12:40 PM Pili Forman DO [facility]   10/2/2024 11:00 AM Ilan Galindo MD [facility]   10/24/2024 12:40 PM Pili Forman DO [facility]

## 2024-07-15 ENCOUNTER — OFFICE VISIT (OUTPATIENT)
Dept: PRIMARY CARE CLINIC | Age: 79
End: 2024-07-15
Payer: MEDICARE

## 2024-07-15 VITALS
WEIGHT: 140.6 LBS | DIASTOLIC BLOOD PRESSURE: 62 MMHG | SYSTOLIC BLOOD PRESSURE: 128 MMHG | OXYGEN SATURATION: 98 % | BODY MASS INDEX: 24.13 KG/M2 | HEART RATE: 91 BPM

## 2024-07-15 DIAGNOSIS — G51.0 BELL'S PALSY: Primary | ICD-10-CM

## 2024-07-15 PROCEDURE — G8420 CALC BMI NORM PARAMETERS: HCPCS | Performed by: FAMILY MEDICINE

## 2024-07-15 PROCEDURE — 99213 OFFICE O/P EST LOW 20 MIN: CPT | Performed by: FAMILY MEDICINE

## 2024-07-15 PROCEDURE — 1036F TOBACCO NON-USER: CPT | Performed by: FAMILY MEDICINE

## 2024-07-15 PROCEDURE — 3074F SYST BP LT 130 MM HG: CPT | Performed by: FAMILY MEDICINE

## 2024-07-15 PROCEDURE — 1123F ACP DISCUSS/DSCN MKR DOCD: CPT | Performed by: FAMILY MEDICINE

## 2024-07-15 PROCEDURE — G8428 CUR MEDS NOT DOCUMENT: HCPCS | Performed by: FAMILY MEDICINE

## 2024-07-15 PROCEDURE — 3078F DIAST BP <80 MM HG: CPT | Performed by: FAMILY MEDICINE

## 2024-07-15 PROCEDURE — 1090F PRES/ABSN URINE INCON ASSESS: CPT | Performed by: FAMILY MEDICINE

## 2024-07-15 PROCEDURE — G8400 PT W/DXA NO RESULTS DOC: HCPCS | Performed by: FAMILY MEDICINE

## 2024-07-15 NOTE — PROGRESS NOTES
without mention of complication, not stated as uncontrolled         Social History     Tobacco Use    Smoking status: Former     Current packs/day: 0.00     Average packs/day: 0.3 packs/day for 51.0 years (12.8 ttl pk-yrs)     Types: Cigarettes     Start date: 3/9/1965     Quit date: 3/9/2016     Years since quittin.3    Smokeless tobacco: Never    Tobacco comments:     intermittent smoking over last 51 yrs.  Occas has a cigarette now. Quit routine smoking    Vaping Use    Vaping Use: Never used   Substance Use Topics    Alcohol use: Yes     Alcohol/week: 0.0 standard drinks of alcohol     Comment: Occas. x 2-3/ month    Drug use: No        Past Surgical History:   Procedure Laterality Date    BREAST BIOPSY Left     benign excisional biopsy    CARPAL TUNNEL RELEASE      bilateral    COLONOSCOPY  2013    HYSTERECTOMY (CERVIX STATUS UNKNOWN)      partial    MASTECTOMY, PARTIAL Left 2013    LEFT BREAST NEEDLE LOCALIZED PARTIAL MASTECTOMY EXCISIONAL    OTHER SURGICAL HISTORY      medications for sedation can make her jittery    TUBAL LIGATION          Allergies   Allergen Reactions    Benadryl [Diphenhydramine] Nausea And Vomiting    Erythromycin Nausea And Vomiting        Family History   Problem Relation Age of Onset    High Blood Pressure Mother     High Cholesterol Mother     Depression Mother     High Blood Pressure Brother     High Cholesterol Brother     Diabetes Maternal Grandmother         Patient's past medical history, surgical history, family history, medications, and allergies  were all reviewed and updated as appropriate today.    Review of Systems   Constitutional:  Negative for fever.   Neurological:  Negative for dizziness and weakness.       /62   Pulse 91   Wt 63.8 kg (140 lb 9.6 oz)   LMP  (LMP Unknown)   SpO2 98%   BMI 24.13 kg/m²      Physical Exam  Constitutional:       General: She is not in acute distress.     Appearance: Normal appearance. She is not toxic-appearing.

## 2024-08-02 DIAGNOSIS — E11.65 UNCONTROLLED TYPE 2 DIABETES MELLITUS WITH HYPERGLYCEMIA (HCC): Primary | ICD-10-CM

## 2024-08-02 DIAGNOSIS — Z63.6 CAREGIVER STRESS: ICD-10-CM

## 2024-08-02 DIAGNOSIS — F41.9 ANXIETY: ICD-10-CM

## 2024-08-02 SDOH — SOCIAL STABILITY - SOCIAL INSECURITY: DEPENDENT RELATIVE NEEDING CARE AT HOME: Z63.6

## 2024-08-02 NOTE — TELEPHONE ENCOUNTER
Medication:   Requested Prescriptions     Pending Prescriptions Disp Refills    sertraline (ZOLOFT) 50 MG tablet [Pharmacy Med Name: SERTRALINE HCL 50 MG TABS 50 Tablet] 90 tablet 3     Sig: TAKE ONE TABLET DAILY     Last Filled:  n/a    Last appt: 7/15/2024   Next appt: 10/24/2024

## 2024-08-05 RX ORDER — GLIPIZIDE 10 MG/1
10 TABLET, FILM COATED, EXTENDED RELEASE ORAL DAILY
Qty: 90 TABLET | Refills: 3 | Status: SHIPPED | OUTPATIENT
Start: 2024-08-05

## 2024-08-05 NOTE — TELEPHONE ENCOUNTER
Medication:   Requested Prescriptions     Signed Prescriptions Disp Refills    glipiZIDE (GLUCOTROL XL) 10 MG extended release tablet 90 tablet 3     Sig: TAKE ONE TABLET DAILY     Authorizing Provider: RASHMI PRAJAPATI     Ordering User: ANDREWS SANDOVAL       Last Filled:      Patient Phone Number: [phone] (home)     Last appt: 6/10/2024   Next appt: 10/2/2024    Last Labs DM:   Lab Results   Component Value Date/Time    LABA1C 8.2 06/10/2024 03:30 PM

## 2024-09-05 ENCOUNTER — OFFICE VISIT (OUTPATIENT)
Dept: PRIMARY CARE CLINIC | Age: 79
End: 2024-09-05
Payer: MEDICARE

## 2024-09-05 VITALS
BODY MASS INDEX: 22.38 KG/M2 | SYSTOLIC BLOOD PRESSURE: 120 MMHG | DIASTOLIC BLOOD PRESSURE: 68 MMHG | WEIGHT: 130.4 LBS | OXYGEN SATURATION: 99 % | HEART RATE: 73 BPM

## 2024-09-05 DIAGNOSIS — G51.0 BELL'S PALSY: Primary | ICD-10-CM

## 2024-09-05 DIAGNOSIS — Z01.818 PRE-OP EXAM: ICD-10-CM

## 2024-09-05 PROCEDURE — G8400 PT W/DXA NO RESULTS DOC: HCPCS | Performed by: FAMILY MEDICINE

## 2024-09-05 PROCEDURE — 1090F PRES/ABSN URINE INCON ASSESS: CPT | Performed by: FAMILY MEDICINE

## 2024-09-05 PROCEDURE — G8427 DOCREV CUR MEDS BY ELIG CLIN: HCPCS | Performed by: FAMILY MEDICINE

## 2024-09-05 PROCEDURE — 1123F ACP DISCUSS/DSCN MKR DOCD: CPT | Performed by: FAMILY MEDICINE

## 2024-09-05 PROCEDURE — 3078F DIAST BP <80 MM HG: CPT | Performed by: FAMILY MEDICINE

## 2024-09-05 PROCEDURE — 99213 OFFICE O/P EST LOW 20 MIN: CPT | Performed by: FAMILY MEDICINE

## 2024-09-05 PROCEDURE — G8420 CALC BMI NORM PARAMETERS: HCPCS | Performed by: FAMILY MEDICINE

## 2024-09-05 PROCEDURE — 3074F SYST BP LT 130 MM HG: CPT | Performed by: FAMILY MEDICINE

## 2024-09-05 PROCEDURE — 1036F TOBACCO NON-USER: CPT | Performed by: FAMILY MEDICINE

## 2024-09-05 NOTE — PROGRESS NOTES
[facility]  [facility]  [address] [place] [address]  Dept: [phone]  Dept Fax: [phone]     9/5/2024      Tammy Card   1945     Chief Complaint   Patient presents with    Annual Exam     9/10/24        HPI    Pt comes in today for pre-op exam.     Diagnosis: Bell's palsy    Date scheduled: 9/10/24     Anesthesia: Topical/MAC    Blood thinner/ASA/NSAIDs: None    H/o anesthesia complications: None    Pre-operative testing: None    H/o cardiac or pulmonary disease: HTN    Functional capacity: 3-5  ( <4 MET = Self care, ADLs, walking indoors, 3-5 MET = Light work around the house, climb stairs or walk up hill, 5-7 METs = Heavy cleaning around the house, moderate yard work, run a short distance, 8-10 METs = Moderate to strenuous recreational sports, daily or prolonged exercise)       No data recorded     Prior to Visit Medications    Medication Sig Taking? Authorizing Provider   glipiZIDE (GLUCOTROL XL) 10 MG extended release tablet TAKE ONE TABLET DAILY Yes Ilan Galindo MD   sertraline (ZOLOFT) 50 MG tablet TAKE ONE TABLET DAILY Yes Pili Forman DO   LANTUS SOLOSTAR 100 UNIT/ML injection pen INJECT 60 UNITS SUBCUTANEOUSLY DAILY Yes Ilan Galindo MD   Liraglutide (VICTOZA) 18 MG/3ML SOPN SC injection INJECT 1.8MG SUBCUTANEOUSLY DAILY Yes Ilan Galindo MD   lisinopril (PRINIVIL;ZESTRIL) 5 MG tablet TAKE ONE TABLET DAILY Yes Pili Forman DO   metFORMIN (GLUCOPHAGE-XR) 500 MG extended release tablet TAKE ONE TABLET BY MOUTH DAILY WITH FOOD Yes Ilan Galindo MD   ONETOUCH VERIO strip USE ONE STRIP TO TEST DAILY AS NEEDED Yes Ilan Galindo MD   rosuvastatin (CRESTOR) 40 MG tablet TAKE ONE TABLET BY MOUTH EACH EVENING Yes Pili Forman DO   JARDIANCE 25 MG tablet TAKE ONE TABLET BY MOUTH DAILY Yes Pili Forman DO   fenofibrate (TRIGLIDE) 160 MG tablet TAKE ONE TABLET BY MOUTH

## 2024-09-06 ASSESSMENT — ENCOUNTER SYMPTOMS
COUGH: 0
SHORTNESS OF BREATH: 0

## 2024-10-04 ENCOUNTER — TELEPHONE (OUTPATIENT)
Dept: PRIMARY CARE CLINIC | Age: 79
End: 2024-10-04

## 2024-10-04 NOTE — TELEPHONE ENCOUNTER
----- Message from Tyler KAUR sent at 10/4/2024 10:39 AM EDT -----  Regarding: ECC Referral Request  ECC Referral Request    Reason for referral request: Specialty Provider    Specialist/Lab/Test patient is requesting (if known): Neurologist    Specialist Phone Number (if applicable):    Additional Info : Pt received a message to a see a neurologist prior to see Dr. Pili Forman. Pt has memory loss and her pcp advice her to see neurologist first and pt would like to request a referral from her doctor.   --------------------------------------------------------------------------------------------------------------------------    Relationship to Patient: Self     Call Back Information: OK to leave message on voicemail  Preferred Call Back Number: Phone [phone]  
Okay to place a neurology referral?  
redness to right calf

## 2024-10-08 ENCOUNTER — OFFICE VISIT (OUTPATIENT)
Dept: ENDOCRINOLOGY | Age: 79
End: 2024-10-08
Payer: MEDICARE

## 2024-10-08 VITALS
WEIGHT: 129 LBS | SYSTOLIC BLOOD PRESSURE: 128 MMHG | HEART RATE: 84 BPM | OXYGEN SATURATION: 97 % | DIASTOLIC BLOOD PRESSURE: 83 MMHG | RESPIRATION RATE: 14 BRPM | BODY MASS INDEX: 22.02 KG/M2 | HEIGHT: 64 IN

## 2024-10-08 DIAGNOSIS — I10 PRIMARY HYPERTENSION: ICD-10-CM

## 2024-10-08 DIAGNOSIS — E11.65 UNCONTROLLED TYPE 2 DIABETES MELLITUS WITH HYPERGLYCEMIA (HCC): Primary | ICD-10-CM

## 2024-10-08 DIAGNOSIS — E11.65 UNCONTROLLED TYPE 2 DIABETES MELLITUS WITH HYPERGLYCEMIA (HCC): ICD-10-CM

## 2024-10-08 LAB
EST. AVERAGE GLUCOSE BLD GHB EST-MCNC: 294.8 MG/DL
HBA1C MFR BLD: 11.9 %

## 2024-10-08 PROCEDURE — 1123F ACP DISCUSS/DSCN MKR DOCD: CPT | Performed by: INTERNAL MEDICINE

## 2024-10-08 PROCEDURE — 1090F PRES/ABSN URINE INCON ASSESS: CPT | Performed by: INTERNAL MEDICINE

## 2024-10-08 PROCEDURE — G8420 CALC BMI NORM PARAMETERS: HCPCS | Performed by: INTERNAL MEDICINE

## 2024-10-08 PROCEDURE — G8484 FLU IMMUNIZE NO ADMIN: HCPCS | Performed by: INTERNAL MEDICINE

## 2024-10-08 PROCEDURE — G2211 COMPLEX E/M VISIT ADD ON: HCPCS | Performed by: INTERNAL MEDICINE

## 2024-10-08 PROCEDURE — G8400 PT W/DXA NO RESULTS DOC: HCPCS | Performed by: INTERNAL MEDICINE

## 2024-10-08 PROCEDURE — G8427 DOCREV CUR MEDS BY ELIG CLIN: HCPCS | Performed by: INTERNAL MEDICINE

## 2024-10-08 PROCEDURE — 99214 OFFICE O/P EST MOD 30 MIN: CPT | Performed by: INTERNAL MEDICINE

## 2024-10-08 PROCEDURE — 1036F TOBACCO NON-USER: CPT | Performed by: INTERNAL MEDICINE

## 2024-10-08 PROCEDURE — 3052F HG A1C>EQUAL 8.0%<EQUAL 9.0%: CPT | Performed by: INTERNAL MEDICINE

## 2024-10-08 PROCEDURE — 3079F DIAST BP 80-89 MM HG: CPT | Performed by: INTERNAL MEDICINE

## 2024-10-08 PROCEDURE — 3074F SYST BP LT 130 MM HG: CPT | Performed by: INTERNAL MEDICINE

## 2024-10-08 RX ORDER — TIRZEPATIDE 2.5 MG/.5ML
2.5 INJECTION, SOLUTION SUBCUTANEOUS WEEKLY
Qty: 4 EACH | Refills: 0 | Status: SHIPPED | OUTPATIENT
Start: 2024-10-08

## 2024-10-08 NOTE — PROGRESS NOTES
Seen as f/u      Interim:    Did not start CGM yet  Prefers fingerstick  She had missed insulin for sometime  Reports forget insulin  Takes care of   Son moved in    Decreasing metformin helped diarrhea  Does eat more CHO during stress   dementia  Thinks handling changes will be difficult    Diagnosed with Type 2 diabetes mellitus in 2006  Known diabetic complications: Neuropathy    Current diabetic medications   Metformin 500mg ER daily  Jardiance  25mg  Victoza  1.8  lantus 60  Glipizide 10mg      h/o use of sulfonylurea/ TZD      Last A1c  in office 11.1%<----8.2%<---- 7.7%<---- 8.0%<----8.1%<------7.3%<-----7.8%<------ 7.1%,-----7.4 %<-----7.1% <------  6.3%<------ 7.5%<------ 8.5%<------7.6%<------- 9%<------9.8 on 1/18 <--- 10.2 <--- 9.1    Prior visit with dietician: Yes   Current diet: on average, 3 meals per day   Current exercise: walking   Current monitoring regimen: home blood tests -1  times daily     Has brought blood glucose log/meter:yes  Home blood sugar records:   Any episodes of hypoglycemia? 58    No Hx of CAD , PVD, CVA    Hyperlipidemia: controlled, taking statin  LDL 36 on 1/18   crestor 40mg fenofibrate 160mg  LDL 42 on 4/19  LDL 57 on 7/20  LDL 64  Last eye exam: 3/23  Last foot exam: 1/21 by poiatrist  Last microalbumin to creatinine ratio: 3/23    HTN: Controlled, on  lisinopril 5mg    States hair loss for last 2 years    1/18 Testosterone < 3  DHEA-S 20  TSH 1.04    Calcium high in the past but normal since 2011 1/18 Ca 9.8  7/11  Ca 11.0  6/09 Ca 10.9        Past Medical History:   Diagnosis Date    Anesthesia     hx of reaction to medications can have \"opposite\" effect with sedatives and causes patient to be jittery, agitated.  Patient has concern what will be given.     Arthritis     Glaucoma     Hyperlipidemia     Hypertension     Type II or unspecified type diabetes mellitus without mention of complication, not stated as uncontrolled      Past Surgical History:

## 2024-10-09 ENCOUNTER — TELEPHONE (OUTPATIENT)
Dept: PRIMARY CARE CLINIC | Age: 79
End: 2024-10-09

## 2024-10-09 NOTE — TELEPHONE ENCOUNTER
----- Message from Diego CHOUDHARY sent at 10/8/2024  4:10 PM EDT -----  Regarding: ECC Referral Request  ECC Referral Request    Reason for referral request: Specialty Provider    Specialist/Lab/Test patient is requesting (if known): Endocrinologist    Specialist Phone Number (if applicable):    Additional Information The patient is requesting for a referral for an endocrinologist as she is waiting for the call back from her PCP  --------------------------------------------------------------------------------------------------------------------------    Relationship to Patient: Self     Call Back Information: OK to leave message on voicemail  Preferred Call Back Number: Phone [phone]

## 2024-10-09 NOTE — TELEPHONE ENCOUNTER
Called patient regarding reqyest. Informed patient that it appears that she sees an endocrinologist already, . patient saw provider yesterday for appointment. Patient states she didn't realize that he was an endocrinologist.     Closing encounter since nothing further is needed.

## 2024-10-10 LAB — FRUCTOSAMINE SERPL-SCNC: 438 UMOL/L (ref 205–285)

## 2024-10-24 ENCOUNTER — OFFICE VISIT (OUTPATIENT)
Dept: PRIMARY CARE CLINIC | Age: 79
End: 2024-10-24

## 2024-10-24 VITALS
BODY MASS INDEX: 22.86 KG/M2 | OXYGEN SATURATION: 96 % | TEMPERATURE: 97.9 F | WEIGHT: 129 LBS | HEIGHT: 63 IN | DIASTOLIC BLOOD PRESSURE: 69 MMHG | SYSTOLIC BLOOD PRESSURE: 116 MMHG | HEART RATE: 71 BPM

## 2024-10-24 DIAGNOSIS — G31.84 MILD COGNITIVE IMPAIRMENT: ICD-10-CM

## 2024-10-24 DIAGNOSIS — Z00.00 MEDICARE ANNUAL WELLNESS VISIT, SUBSEQUENT: Primary | ICD-10-CM

## 2024-10-24 DIAGNOSIS — R41.3 MEMORY LOSS OR IMPAIRMENT: ICD-10-CM

## 2024-10-24 SDOH — HEALTH STABILITY: PHYSICAL HEALTH: ON AVERAGE, HOW MANY DAYS PER WEEK DO YOU ENGAGE IN MODERATE TO STRENUOUS EXERCISE (LIKE A BRISK WALK)?: 3 DAYS

## 2024-10-24 SDOH — HEALTH STABILITY: PHYSICAL HEALTH: ON AVERAGE, HOW MANY MINUTES DO YOU ENGAGE IN EXERCISE AT THIS LEVEL?: 40 MIN

## 2024-10-24 ASSESSMENT — PATIENT HEALTH QUESTIONNAIRE - PHQ9
SUM OF ALL RESPONSES TO PHQ QUESTIONS 1-9: 2
2. FEELING DOWN, DEPRESSED OR HOPELESS: SEVERAL DAYS
SUM OF ALL RESPONSES TO PHQ QUESTIONS 1-9: 2
SUM OF ALL RESPONSES TO PHQ QUESTIONS 1-9: 2
SUM OF ALL RESPONSES TO PHQ9 QUESTIONS 1 & 2: 2
1. LITTLE INTEREST OR PLEASURE IN DOING THINGS: SEVERAL DAYS
SUM OF ALL RESPONSES TO PHQ QUESTIONS 1-9: 2

## 2024-10-24 ASSESSMENT — LIFESTYLE VARIABLES
HOW OFTEN DO YOU HAVE A DRINK CONTAINING ALCOHOL: MONTHLY OR LESS
HOW OFTEN DO YOU HAVE SIX OR MORE DRINKS ON ONE OCCASION: 1
HOW OFTEN DO YOU HAVE A DRINK CONTAINING ALCOHOL: 2
HOW MANY STANDARD DRINKS CONTAINING ALCOHOL DO YOU HAVE ON A TYPICAL DAY: 1 OR 2
HOW MANY STANDARD DRINKS CONTAINING ALCOHOL DO YOU HAVE ON A TYPICAL DAY: 1

## 2024-10-24 NOTE — PROGRESS NOTES
Medicare Annual Wellness Visit    Tammy Card is here for Medicare AWV    Assessment & Plan   Medicare annual wellness visit, subsequent  Memory loss or impairment  -     Non [facility] - External Referral To Neurology  Mild cognitive impairment    Referral to neurology as requested. May benefit from more comprehensive neuropsych evaluation given that subjective memory concerns seem to be greater than what testing has indicated thus far. Discussed likely impact of psychosocial factors.     She wants to wait to get flu vaccine done with her .     Recommendations for Preventive Services Due: see orders and patient instructions/AVS.  Recommended screening schedule for the next 5-10 years is provided to the patient in written form: see Patient Instructions/AVS.     Return in 6 months (on 4/24/2025).     Subjective     Patient here with son who expresses concern over short term memory loss. Would like specific dementia testing done.  has established diagnosis of alzheimer's. Patient saw Geriatrician and [facility] aging clinic. MRI brain was ordered but never completed.     Total MOCA score - 26/30    5/5: Visuospatial/Executive  3/3: Naming   Attention:  2/2: List of digits   1/1: List of letters  3/3: Serial 7 subtraction  Language:  2/2: Repeat  1/1: Fluency  2/2: Abstraction  1/5: Delayed Recall  6/6: Orientation     Patient's complete Health Risk Assessment and screening values have been reviewed and are found in Flowsheets. The following problems were reviewed today and where indicated follow up appointments were made and/or referrals ordered.    Positive Risk Factor Screenings with Interventions:               General HRA Questions:  Select all that apply: (!) New or Increased Pain  Interventions - Pain:  C/o mild neck pain            Safety:  Do you have non-slip mats or non-slip surfaces or shower bars or grab bars in your shower or bathtub?: (!) No  Interventions:  Patient declined any further

## 2024-12-05 ENCOUNTER — OFFICE VISIT (OUTPATIENT)
Dept: PRIMARY CARE CLINIC | Age: 79
End: 2024-12-05
Payer: MEDICARE

## 2024-12-05 VITALS
WEIGHT: 117.4 LBS | OXYGEN SATURATION: 97 % | DIASTOLIC BLOOD PRESSURE: 63 MMHG | HEART RATE: 70 BPM | SYSTOLIC BLOOD PRESSURE: 96 MMHG | BODY MASS INDEX: 20.8 KG/M2

## 2024-12-05 DIAGNOSIS — E11.65 TYPE 2 DIABETES MELLITUS WITH HYPERGLYCEMIA, WITH LONG-TERM CURRENT USE OF INSULIN (HCC): Primary | ICD-10-CM

## 2024-12-05 DIAGNOSIS — R63.4 WEIGHT LOSS, UNINTENTIONAL: ICD-10-CM

## 2024-12-05 DIAGNOSIS — Z79.4 TYPE 2 DIABETES MELLITUS WITH HYPERGLYCEMIA, WITH LONG-TERM CURRENT USE OF INSULIN (HCC): Primary | ICD-10-CM

## 2024-12-05 DIAGNOSIS — E11.65 TYPE 2 DIABETES MELLITUS WITH HYPERGLYCEMIA, WITH LONG-TERM CURRENT USE OF INSULIN (HCC): ICD-10-CM

## 2024-12-05 DIAGNOSIS — I10 ESSENTIAL HYPERTENSION: ICD-10-CM

## 2024-12-05 DIAGNOSIS — Z79.4 TYPE 2 DIABETES MELLITUS WITH HYPERGLYCEMIA, WITH LONG-TERM CURRENT USE OF INSULIN (HCC): ICD-10-CM

## 2024-12-05 LAB
ANION GAP SERPL CALCULATED.3IONS-SCNC: 11 MMOL/L (ref 3–16)
BASOPHILS # BLD: 0.1 K/UL (ref 0–0.2)
BASOPHILS NFR BLD: 0.8 %
BUN SERPL-MCNC: 28 MG/DL (ref 7–20)
CALCIUM SERPL-MCNC: 10.3 MG/DL (ref 8.3–10.6)
CHLORIDE SERPL-SCNC: 102 MMOL/L (ref 99–110)
CO2 SERPL-SCNC: 25 MMOL/L (ref 21–32)
CREAT SERPL-MCNC: 1.3 MG/DL (ref 0.6–1.2)
DEPRECATED RDW RBC AUTO: 15 % (ref 12.4–15.4)
EOSINOPHIL # BLD: 0 K/UL (ref 0–0.6)
EOSINOPHIL NFR BLD: 0.6 %
GFR SERPLBLD CREATININE-BSD FMLA CKD-EPI: 42 ML/MIN/{1.73_M2}
GLUCOSE SERPL-MCNC: 260 MG/DL (ref 70–99)
HCT VFR BLD AUTO: 48 % (ref 36–48)
HGB BLD-MCNC: 15.9 G/DL (ref 12–16)
LYMPHOCYTES # BLD: 1.6 K/UL (ref 1–5.1)
LYMPHOCYTES NFR BLD: 24.1 %
MCH RBC QN AUTO: 28.1 PG (ref 26–34)
MCHC RBC AUTO-ENTMCNC: 33.1 G/DL (ref 31–36)
MCV RBC AUTO: 84.7 FL (ref 80–100)
MONOCYTES # BLD: 0.3 K/UL (ref 0–1.3)
MONOCYTES NFR BLD: 4.5 %
NEUTROPHILS # BLD: 4.7 K/UL (ref 1.7–7.7)
NEUTROPHILS NFR BLD: 70 %
PLATELET # BLD AUTO: 229 K/UL (ref 135–450)
PMV BLD AUTO: 11.2 FL (ref 5–10.5)
POTASSIUM SERPL-SCNC: 4.4 MMOL/L (ref 3.5–5.1)
RBC # BLD AUTO: 5.67 M/UL (ref 4–5.2)
SODIUM SERPL-SCNC: 138 MMOL/L (ref 136–145)
TSH SERPL DL<=0.005 MIU/L-ACNC: 1 UIU/ML (ref 0.27–4.2)
WBC # BLD AUTO: 6.7 K/UL (ref 4–11)

## 2024-12-05 PROCEDURE — G8400 PT W/DXA NO RESULTS DOC: HCPCS | Performed by: FAMILY MEDICINE

## 2024-12-05 PROCEDURE — 1123F ACP DISCUSS/DSCN MKR DOCD: CPT | Performed by: FAMILY MEDICINE

## 2024-12-05 PROCEDURE — 3078F DIAST BP <80 MM HG: CPT | Performed by: FAMILY MEDICINE

## 2024-12-05 PROCEDURE — G8427 DOCREV CUR MEDS BY ELIG CLIN: HCPCS | Performed by: FAMILY MEDICINE

## 2024-12-05 PROCEDURE — 1159F MED LIST DOCD IN RCRD: CPT | Performed by: FAMILY MEDICINE

## 2024-12-05 PROCEDURE — G8420 CALC BMI NORM PARAMETERS: HCPCS | Performed by: FAMILY MEDICINE

## 2024-12-05 PROCEDURE — 3046F HEMOGLOBIN A1C LEVEL >9.0%: CPT | Performed by: FAMILY MEDICINE

## 2024-12-05 PROCEDURE — 1036F TOBACCO NON-USER: CPT | Performed by: FAMILY MEDICINE

## 2024-12-05 PROCEDURE — 1090F PRES/ABSN URINE INCON ASSESS: CPT | Performed by: FAMILY MEDICINE

## 2024-12-05 PROCEDURE — G8484 FLU IMMUNIZE NO ADMIN: HCPCS | Performed by: FAMILY MEDICINE

## 2024-12-05 PROCEDURE — 1160F RVW MEDS BY RX/DR IN RCRD: CPT | Performed by: FAMILY MEDICINE

## 2024-12-05 PROCEDURE — 3074F SYST BP LT 130 MM HG: CPT | Performed by: FAMILY MEDICINE

## 2024-12-05 PROCEDURE — 99214 OFFICE O/P EST MOD 30 MIN: CPT | Performed by: FAMILY MEDICINE

## 2024-12-06 ENCOUNTER — TELEPHONE (OUTPATIENT)
Dept: PRIMARY CARE CLINIC | Age: 79
End: 2024-12-06

## 2024-12-06 NOTE — TELEPHONE ENCOUNTER
Please let patient or son know that I spoke with Dr. Denton and he agrees with staying off of the Mounjaro. He will follow up with her as scheduled next month. Labs are okay, but does need to be sure to stay hydrated drinking 6-8 glasses of water a day.

## 2024-12-09 DIAGNOSIS — E78.2 MIXED HYPERLIPIDEMIA: ICD-10-CM

## 2024-12-09 NOTE — TELEPHONE ENCOUNTER
Son called back and informed of message and recommendation.       Patients son has questions regarding insulin. He states patient is only using Victoza , he wants to know if she should be on any other insulin?

## 2024-12-09 NOTE — TELEPHONE ENCOUNTER
Patient is supposed to be on Lantus 60 units nightly as prescribed by her endocrinologist. Victoza is not an insulin and was stopped when she was supposed to be started on Mounjaro (which is currently held).

## 2024-12-09 NOTE — PROGRESS NOTES
Normal rate.   Pulmonary:      Effort: Pulmonary effort is normal. No respiratory distress.      Breath sounds: Normal breath sounds.   Neurological:      General: No focal deficit present.      Mental Status: She is alert.      Gait: Gait normal.   Psychiatric:         Mood and Affect: Mood normal.         Behavior: Behavior normal.         Assessment:  Encounter Diagnoses   Name Primary?    Type 2 diabetes mellitus with hyperglycemia, with long-term current use of insulin (HCC) Yes    Essential hypertension     Weight loss, unintentional        Plan:    - Anorexia with ~20 lbs weight loss. Likely 2/2 uncontrolled DM + recent addition of Mounjaro. I reviewed medication list extensively with patient and son who agrees to help administer her medications daily. Encouraged compliance with basal insulin. Monitor home sugars. Contacted Dr. Galindo to update and he agrees with holding Mounjaro. Will consider adjustment at follow up visit next month.   - BP stable.   - Lengthy discussion about concern for memory loss. Son is very interested in establishing dementia diagnosis. Advised that thus far her testing has been essentially normal. As we have discussed previously, I suspect that her cognition is being impacted by situational/caregiver stress as well as poor health status. Will continue to work to optimize these and re-evaluate. Son is requesting specific dementia testing such as amyloid labs and PET scan. Advised they would need to discuss with Neurology if testing appropriate.   - Go to lab today to check lytes, TSH.     New Prescriptions    No medications on file        Orders Placed This Encounter   Procedures    CBC with Auto Differential    Basic Metabolic Panel    TSH with Reflex        No follow-ups on file.         Pili Forman, DO

## 2024-12-09 NOTE — TELEPHONE ENCOUNTER
Called patient to follow up on message, patients  answered the phone. He states he will have patient call the office back.

## 2024-12-10 RX ORDER — FENOFIBRATE 160 MG/1
TABLET ORAL
Qty: 90 TABLET | Refills: 3 | Status: SHIPPED | OUTPATIENT
Start: 2024-12-10

## 2024-12-10 RX ORDER — ROSUVASTATIN CALCIUM 40 MG/1
TABLET, COATED ORAL
Qty: 90 TABLET | Refills: 3 | Status: SHIPPED | OUTPATIENT
Start: 2024-12-10

## 2024-12-11 ENCOUNTER — CARE COORDINATION (OUTPATIENT)
Dept: CARE COORDINATION | Age: 79
End: 2024-12-11

## 2024-12-11 NOTE — CARE COORDINATION
PCP referral    Possible need for additional support in home w care needs of .    Call to patient, Lm on VM with this AC name and number  Jacy Dejesus RN  Ambulatory Care Manager  [phone]

## 2024-12-11 NOTE — CARE COORDINATION
Ambulatory Care Coordination Note     2024 4:24 PM     Patient Current Location:  Home: [address]  [facility] [address]     This patient was received as a referral from Provider.    ACM contacted the patient by telephone. Verified name and  with patient as identifiers. Provided introduction to self, and explanation of the ACM role.   Patient accepted care management services at this time.          ACM: Meaghan Dejesus RN     Challenges to be reviewed by the provider   Additional needs identified to be addressed with provider No  none               Method of communication with provider: chart routing.    Utilization: Patient has not had any utilization since our last call.     Care Summary Note: Return call from patient   Long discussion re pt concerns  Son has moved in and is managing medications/ reminders  Supposed to go to [place] for a couple weeks after , but unsure about it currently  Endo in march  Neuro- neuropathy memory loss- appt soon    Reports DM had been well controlled for many years but has recent period where she stopped/ forgot to take medications  Stress from caregiving/ cog impairment.. she is concerns' cares for  w alzheimers   Son a big help   Family/ friends who are big helps as well per pt      Hemoglobin A1C   Date Value Ref Range Status   10/08/2024 11.9 See comment % Final     Comment:     Comment:  Diagnosis of Diabetes: > or = 6.5%  Increased risk of diabetes (Prediabetes): 5.7-6.4%  Glycemic Control: Nonpregnant Adults: <7.0%                    Pregnant: <6.0%               Offered patient enrollment in the Remote Patient Monitoring (RPM) program for in-home monitoring: Yes, but did not enroll at this time: limited patient ability to navigate RPM/equipment.     Assessments Completed:   No changes since last call    Medications Reviewed:   Patient denies any changes with medications and reports taking all medications as prescribed.    Advance Care Planning:

## 2024-12-12 NOTE — CARE COORDINATION
I agree with the Care Coordinator's Plan of Care     We discussed HHC at last visit. She is still active and driving outside the home on a regular basis. I don't believe she qualifies for home bound status at this time.

## 2024-12-19 DIAGNOSIS — E11.9 INSULIN DEPENDENT TYPE 2 DIABETES MELLITUS (HCC): ICD-10-CM

## 2024-12-19 DIAGNOSIS — Z79.4 INSULIN DEPENDENT TYPE 2 DIABETES MELLITUS (HCC): ICD-10-CM

## 2024-12-19 RX ORDER — EMPAGLIFLOZIN 25 MG/1
TABLET, FILM COATED ORAL
Qty: 30 TABLET | Refills: 0 | Status: SHIPPED | OUTPATIENT
Start: 2024-12-19

## 2024-12-19 RX ORDER — EMPAGLIFLOZIN 25 MG/1
TABLET, FILM COATED ORAL
Qty: 30 TABLET | Refills: 11 | OUTPATIENT
Start: 2024-12-19

## 2024-12-26 DIAGNOSIS — Z79.4 INSULIN DEPENDENT TYPE 2 DIABETES MELLITUS (HCC): ICD-10-CM

## 2024-12-26 DIAGNOSIS — E11.9 INSULIN DEPENDENT TYPE 2 DIABETES MELLITUS (HCC): ICD-10-CM

## 2024-12-30 RX ORDER — EMPAGLIFLOZIN 25 MG/1
TABLET, FILM COATED ORAL
Qty: 30 TABLET | Refills: 0 | OUTPATIENT
Start: 2024-12-30

## 2025-01-14 ENCOUNTER — OFFICE VISIT (OUTPATIENT)
Dept: ENDOCRINOLOGY | Age: 80
End: 2025-01-14
Payer: MEDICARE

## 2025-01-14 VITALS
HEART RATE: 66 BPM | RESPIRATION RATE: 14 BRPM | DIASTOLIC BLOOD PRESSURE: 66 MMHG | SYSTOLIC BLOOD PRESSURE: 108 MMHG | WEIGHT: 117 LBS | BODY MASS INDEX: 20.73 KG/M2 | OXYGEN SATURATION: 97 %

## 2025-01-14 DIAGNOSIS — I10 ESSENTIAL HYPERTENSION: ICD-10-CM

## 2025-01-14 DIAGNOSIS — E11.65 UNCONTROLLED TYPE 2 DIABETES MELLITUS WITH HYPERGLYCEMIA (HCC): ICD-10-CM

## 2025-01-14 DIAGNOSIS — E11.65 UNCONTROLLED TYPE 2 DIABETES MELLITUS WITH HYPERGLYCEMIA (HCC): Primary | ICD-10-CM

## 2025-01-14 LAB — HBA1C MFR BLD: 10.4 %

## 2025-01-14 PROCEDURE — 83036 HEMOGLOBIN GLYCOSYLATED A1C: CPT | Performed by: INTERNAL MEDICINE

## 2025-01-14 PROCEDURE — G8400 PT W/DXA NO RESULTS DOC: HCPCS | Performed by: INTERNAL MEDICINE

## 2025-01-14 PROCEDURE — 3074F SYST BP LT 130 MM HG: CPT | Performed by: INTERNAL MEDICINE

## 2025-01-14 PROCEDURE — 3078F DIAST BP <80 MM HG: CPT | Performed by: INTERNAL MEDICINE

## 2025-01-14 PROCEDURE — 1090F PRES/ABSN URINE INCON ASSESS: CPT | Performed by: INTERNAL MEDICINE

## 2025-01-14 PROCEDURE — 1159F MED LIST DOCD IN RCRD: CPT | Performed by: INTERNAL MEDICINE

## 2025-01-14 PROCEDURE — G8427 DOCREV CUR MEDS BY ELIG CLIN: HCPCS | Performed by: INTERNAL MEDICINE

## 2025-01-14 PROCEDURE — 99214 OFFICE O/P EST MOD 30 MIN: CPT | Performed by: INTERNAL MEDICINE

## 2025-01-14 PROCEDURE — 1036F TOBACCO NON-USER: CPT | Performed by: INTERNAL MEDICINE

## 2025-01-14 PROCEDURE — 1123F ACP DISCUSS/DSCN MKR DOCD: CPT | Performed by: INTERNAL MEDICINE

## 2025-01-14 PROCEDURE — G8420 CALC BMI NORM PARAMETERS: HCPCS | Performed by: INTERNAL MEDICINE

## 2025-01-14 PROCEDURE — G2211 COMPLEX E/M VISIT ADD ON: HCPCS | Performed by: INTERNAL MEDICINE

## 2025-01-14 RX ORDER — INSULIN LISPRO 100 [IU]/ML
INJECTION, SOLUTION INTRAVENOUS; SUBCUTANEOUS
Qty: 5 ADJUSTABLE DOSE PRE-FILLED PEN SYRINGE | Refills: 3 | Status: SHIPPED | OUTPATIENT
Start: 2025-01-14

## 2025-01-14 RX ORDER — LIRAGLUTIDE 6 MG/ML
INJECTION SUBCUTANEOUS
Qty: 2 ADJUSTABLE DOSE PRE-FILLED PEN SYRINGE | Refills: 3 | Status: SHIPPED | OUTPATIENT
Start: 2025-01-14

## 2025-01-14 NOTE — PROGRESS NOTES
recommend rapid acting insulin, Will start.  Advised 30 gm CHO with meal  Restart victoza as not taking mounjaro  2.HTN : BP at goal  3.HLD: LDL at goal  4.Hair loss : DHEA-S, TSH nl. Testosterone low, discussed it is unlikely cause of her hair loss. Advised to see dermatology to rule out any other causes.  5.Hypercalcemia: Improved    Plan:        lantus 64 units daily, advised self titration for high or low glucose   Restart Victoza   Glipizide 10mg   Continue metformin once a day    Humalog 4 units AC TID   Advise to check blood sugar 1 -2 times a day   Patient to send blood sugar log for titration.   Advise to exercise regularly. Advise to low simple carbohydrate and protein with each  meal diet.    Diabetes Care: recommend yearly eye exam, foot exam and urine microalbumin to   creatinine ratio. Patient is up-to-date.        F/u in 6 months as will see PCP

## 2025-01-15 ENCOUNTER — CARE COORDINATION (OUTPATIENT)
Dept: CARE COORDINATION | Age: 80
End: 2025-01-15

## 2025-01-15 LAB
CREAT UR-MCNC: 81.7 MG/DL (ref 28–259)
MICROALBUMIN UR DL<=1MG/L-MCNC: <1.2 MG/DL
MICROALBUMIN/CREAT UR: NORMAL MG/G (ref 0–30)

## 2025-01-15 RX ORDER — BLOOD SUGAR DIAGNOSTIC
STRIP MISCELLANEOUS
Qty: 200 EACH | Refills: 4 | Status: SHIPPED | OUTPATIENT
Start: 2025-01-15

## 2025-01-15 NOTE — CARE COORDINATION
Ambulatory Care Coordination Note     1/15/2025 12:22 PM     Patient [facility]:  [facility] [id number]     ACM contacted the patient by telephone. Verified name and  with patient as identifiers.         ACM: Meaghan Dejesus RN     Challenges to be reviewed by the provider   Additional needs identified to be addressed with provider No  none               Method of communication with provider: chart routing.    Utilization: Patient has not had any utilization since our last call.     Care Summary Note: saw endo yesterday, new regimes, reviewed and long discussion re monitoring, carb counting or plate method  Pt cooks a lot of meals at home  \" Eat healthy\"  Yogurt/ fruit at breakfast  \"FBG have been good\", started on bolus of Humalog 4 unit ac, increased Lantus to 64 untis at night  Discussed FBG testing/ dose of Lantus, ac, postprandial testing  Advised by endo 30 g CHO/ protein per meal    Pt will set up log  Call w questions    Son staying w she/ , helps a lot  He may require surgery     doing well per pt , makes breakfast, taking medication from mediset, some coaching    Offered patient enrollment in the Remote Patient Monitoring (RPM) program for in-home monitoring: Yes, but did not enroll at this time: already monitoring with home equipment.     Assessments Completed:   Diabetes Assessment    Medic Alert ID: No  Meal Planning: None   How often do you test your blood sugar?: Daily   Do you have barriers with adherence to non-pharmacologic self-management interventions? (Nutrition/Exercise/Self-Monitoring): No   Have you ever had to go to the ED for symptoms of low blood sugar?: No       No patient-reported symptoms         ,   General Assessment    Do you have any symptoms that are causing concern?: No          Medications Reviewed:   Completed during this call    Advance Care Planning:   Reviewed and current     Care Planning:   Education Documentation  Chronic

## 2025-01-20 DIAGNOSIS — Z79.4 INSULIN DEPENDENT TYPE 2 DIABETES MELLITUS (HCC): ICD-10-CM

## 2025-01-20 DIAGNOSIS — E11.9 INSULIN DEPENDENT TYPE 2 DIABETES MELLITUS (HCC): ICD-10-CM

## 2025-01-21 RX ORDER — EMPAGLIFLOZIN 25 MG/1
TABLET, FILM COATED ORAL
Qty: 90 TABLET | Refills: 1 | Status: SHIPPED | OUTPATIENT
Start: 2025-01-21

## 2025-01-29 ENCOUNTER — CARE COORDINATION (OUTPATIENT)
Dept: CARE COORDINATION | Age: 80
End: 2025-01-29

## 2025-01-29 NOTE — CARE COORDINATION
Ambulatory Care Coordination Note     2025 2:50 PM     Patient Current Location:  Home: [address]  [facility] [address]     ACM contacted the patient by telephone. Verified name and  with patient as identifiers.         ACM: Meaghan Dejesus RN     Challenges to be reviewed by the provider   Additional needs identified to be addressed with provider Yes  Calling thi ACM back with some questions she has for PCP-- in the middle of cleaning out a kitchen cabinet now, so she will call back                Method of communication with provider: chart routing.    Utilization: Patient has not had any utilization since our last call.     Care Summary Note: Reports BG ave been \" good\". Within range   Sees endo in April?  Has some questions for Dr Forman and feels she may need to schedule an appt w Dr Forman to discuss.   Offered to send questions along and if Dr forman needs to schedule w you to discuss then we can go from there    Pt needs to put questions together,she will call back    Offered patient enrollment in the Remote Patient Monitoring (RPM) program for in-home monitoring: Yes, but did not enroll at this time: limited patient ability to navigate RPM/equipment.     Assessments Completed:   Diabetes Assessment    Medic Alert ID: No  Meal Planning: None   How often do you test your blood sugar?: Daily   Do you have barriers with adherence to non-pharmacologic self-management interventions? (Nutrition/Exercise/Self-Monitoring): No   Have you ever had to go to the ED for symptoms of low blood sugar?: No       No patient-reported symptoms             Medications Reviewed:   Completed during a previous call     Advance Care Planning:   Reviewed and current     Care Planning:   Education Documentation  Blood Glucose Monitoring, taught by Meaghan Dejesus, RN at 2025  2:50 PM.  Learner: Patient  Readiness: Acceptance  Method: Explanation  Response: Verbalizes Understanding, Needs

## 2025-01-30 ENCOUNTER — CARE COORDINATION (OUTPATIENT)
Dept: CARE COORDINATION | Age: 80
End: 2025-01-30

## 2025-01-30 NOTE — CARE COORDINATION
Ambulatory Care Coordination Note     2025 2:42 PM     Patient Current Location:  Home: [address] [place] [id number]     ACM contacted the patientby telephone. Verified name and  with patient as identifiers.         ACM: Meaghan Dejesus RN     Challenges to be reviewed by the provider   Additional needs identified to be addressed with provider No  none               Method of communication with provider: chart routing.    Utilization: Patient has not had any utilization since our last call.     Care Summary Note: Call to patient as follow up , will schedule her AWV  And bring med list     Doing puzzles due to her concern re memory    son continues to help w home /  with has dementia  May be going to [place]  for 2 weeks if son surgery plans work around time    Offered patient enrollment in the Remote Patient Monitoring (RPM) program for in-home monitoring: Yes, but did not enroll at this time: limited patient ability to navigate RPM/equipment.     Assessments Completed:   No changes since last call    Medications Reviewed:   Completed during this call    Advance Care Planning:   Reviewed and current     Care Planning:   Education Documentation  Chronic Complications, taught by Meaghan Dejesus, RN at 2025  2:41 PM.  Learner: Patient  Readiness: Acceptance  Method: Explanation  Response: Needs Reinforcement    Blood Glucose Monitoring, taught by Meaghan Dejesus, RN at 2025  2:41 PM.  Learner: Patient  Readiness: Acceptance  Method: Explanation  Response: Needs Reinforcement    Diet, taught by Meaghan Dejesus, RN at 2025  2:41 PM.  Learner: Patient  Readiness: Acceptance  Method: Explanation  Response: Needs Reinforcement    HgbA1c, taught by Meaghan Dejesus, RN at 2025  2:41 PM.  Learner: Patient  Readiness: Acceptance  Method: Explanation  Response: Needs Reinforcement    Education Comments  No comments found.     ,    Goals Addressed                      This Visit's

## 2025-02-13 ENCOUNTER — CARE COORDINATION (OUTPATIENT)
Dept: CARE COORDINATION | Age: 80
End: 2025-02-13

## 2025-02-13 DIAGNOSIS — E11.65 UNCONTROLLED TYPE 2 DIABETES MELLITUS WITH HYPERGLYCEMIA (HCC): ICD-10-CM

## 2025-02-13 NOTE — CARE COORDINATION
Ambulatory Care Coordination Note     2/13/2025 3:34 PM     ACM outreach attempt by this ACM today to perform care management follow up . ACM was unable to reach the family, son  by telephone today;   left voice message requesting a return phone call to this ACM.     ACM: Meaghan Dejesus RN     Care Summary Note: Delvis had returned this ACM call, Called back but LM on     PCP/Specialist follow up:   Future Appointments         Provider Specialty Dept Phone    4/14/2025 2:20 PM Ilan Galindo MD Endocrinology [phone]            Follow Up:   Plan for next AC outreach in approximately 1 week to complete:  - disease specific assessments  - medication review  - goal progression.

## 2025-02-14 RX ORDER — LIRAGLUTIDE 6 MG/ML
INJECTION SUBCUTANEOUS
Qty: 2 ADJUSTABLE DOSE PRE-FILLED PEN SYRINGE | Refills: 3 | Status: SHIPPED | OUTPATIENT
Start: 2025-02-14

## 2025-02-14 RX ORDER — BLOOD SUGAR DIAGNOSTIC
STRIP MISCELLANEOUS
Qty: 100 STRIP | Refills: 2 | Status: SHIPPED | OUTPATIENT
Start: 2025-02-14

## 2025-02-14 RX ORDER — METFORMIN HYDROCHLORIDE 500 MG/1
TABLET, EXTENDED RELEASE ORAL
Qty: 90 TABLET | Refills: 3 | Status: SHIPPED | OUTPATIENT
Start: 2025-02-14

## 2025-02-14 RX ORDER — GLIPIZIDE 10 MG/1
10 TABLET, FILM COATED, EXTENDED RELEASE ORAL DAILY
Qty: 90 TABLET | Refills: 3 | Status: SHIPPED | OUTPATIENT
Start: 2025-02-14

## 2025-02-25 ENCOUNTER — CARE COORDINATION (OUTPATIENT)
Dept: CARE COORDINATION | Age: 80
End: 2025-02-25

## 2025-02-25 NOTE — CARE COORDINATION
Ambulatory Care Coordination Note     2/25/2025 2:05 PM     patient outreach attempt by this ACM today to perform care management follow up . ACM was unable to reach the patient by telephone today;   voicemail full and unable to leave a message.   phone number has been disconnected.      Patient graduated from the High Risk Care Management program on 2/25/2025.  Patient verbalizes confidence in the ability to self-manage at this time..  Care management goals have been completed. No further Ambulatory Care Manager follow up scheduled.     Per notes pts son passed away recently and another son has taken over care of parents. NO number listed in either chart, and unsure of name of son who is caregiver.     Happy to add back to panel if needed

## 2025-03-19 ENCOUNTER — TELEPHONE (OUTPATIENT)
Dept: ENDOCRINOLOGY | Age: 80
End: 2025-03-19

## 2025-03-19 ENCOUNTER — OFFICE VISIT (OUTPATIENT)
Dept: ENDOCRINOLOGY | Age: 80
End: 2025-03-19
Payer: MEDICARE

## 2025-03-19 VITALS
DIASTOLIC BLOOD PRESSURE: 82 MMHG | OXYGEN SATURATION: 96 % | BODY MASS INDEX: 21.26 KG/M2 | HEART RATE: 68 BPM | SYSTOLIC BLOOD PRESSURE: 123 MMHG | RESPIRATION RATE: 14 BRPM | WEIGHT: 120 LBS

## 2025-03-19 DIAGNOSIS — I10 ESSENTIAL HYPERTENSION: ICD-10-CM

## 2025-03-19 DIAGNOSIS — E11.65 UNCONTROLLED TYPE 2 DIABETES MELLITUS WITH HYPERGLYCEMIA (HCC): Primary | ICD-10-CM

## 2025-03-19 DIAGNOSIS — N18.32 CHRONIC KIDNEY DISEASE, STAGE 3B (HCC): ICD-10-CM

## 2025-03-19 LAB — HBA1C MFR BLD: 8.2 %

## 2025-03-19 PROCEDURE — G2211 COMPLEX E/M VISIT ADD ON: HCPCS | Performed by: INTERNAL MEDICINE

## 2025-03-19 PROCEDURE — 3074F SYST BP LT 130 MM HG: CPT | Performed by: INTERNAL MEDICINE

## 2025-03-19 PROCEDURE — 99214 OFFICE O/P EST MOD 30 MIN: CPT | Performed by: INTERNAL MEDICINE

## 2025-03-19 PROCEDURE — G8428 CUR MEDS NOT DOCUMENT: HCPCS | Performed by: INTERNAL MEDICINE

## 2025-03-19 PROCEDURE — 83036 HEMOGLOBIN GLYCOSYLATED A1C: CPT | Performed by: INTERNAL MEDICINE

## 2025-03-19 PROCEDURE — 1036F TOBACCO NON-USER: CPT | Performed by: INTERNAL MEDICINE

## 2025-03-19 PROCEDURE — 3079F DIAST BP 80-89 MM HG: CPT | Performed by: INTERNAL MEDICINE

## 2025-03-19 PROCEDURE — 1123F ACP DISCUSS/DSCN MKR DOCD: CPT | Performed by: INTERNAL MEDICINE

## 2025-03-19 PROCEDURE — G8420 CALC BMI NORM PARAMETERS: HCPCS | Performed by: INTERNAL MEDICINE

## 2025-03-19 PROCEDURE — 1090F PRES/ABSN URINE INCON ASSESS: CPT | Performed by: INTERNAL MEDICINE

## 2025-03-19 PROCEDURE — 3046F HEMOGLOBIN A1C LEVEL >9.0%: CPT | Performed by: INTERNAL MEDICINE

## 2025-03-19 PROCEDURE — G8400 PT W/DXA NO RESULTS DOC: HCPCS | Performed by: INTERNAL MEDICINE

## 2025-03-19 RX ORDER — HYDROCHLOROTHIAZIDE 12.5 MG/1
CAPSULE ORAL
Qty: 2 EACH | Refills: 4 | Status: SHIPPED | OUTPATIENT
Start: 2025-03-19

## 2025-03-19 RX ORDER — INSULIN GLARGINE 100 [IU]/ML
INJECTION, SOLUTION SUBCUTANEOUS
Qty: 15 ML | Refills: 2 | Status: SHIPPED | OUTPATIENT
Start: 2025-03-19

## 2025-03-19 RX ORDER — KETOROLAC TROMETHAMINE 30 MG/ML
INJECTION, SOLUTION INTRAMUSCULAR; INTRAVENOUS
Qty: 1 EACH | Refills: 0 | Status: SHIPPED | OUTPATIENT
Start: 2025-03-19

## 2025-03-19 NOTE — TELEPHONE ENCOUNTER
[facility] called and said pt states she is using 60 units daily. If this is correct, please resubmit new rx     LANTUS SOLOSTAR 100 UNIT/ML injection pen [[id number]]

## 2025-03-19 NOTE — PROGRESS NOTES
\"LABVLDL\"    Lab Results   Component Value Date    LABA1C 10.4 01/14/2025       Assessment:     Tammy Card is a 79 y.o. female with :    1.T2DM: Longstanding, uncontrolled, provided education. Discussed with patient given hyperglycemia, A1c, duration of DM, recommended basal insulin. She was hesitant but agreed after discussion. Start CGM. She is not taking long acting and glucose fair control. Will start lantus and change novolog to SSI. Her A1c is better, has changed diet  They inquire about pump, advised may be difficult to manage for her. Will assess on once a day basal + SSI  Advised 30 gm CHO with meal  Restarted  victoza as not taking mounjaro  2.HTN : BP at goal  3.HLD: LDL at goal  4.Hair loss : DHEA-S, TSH nl. Testosterone low, discussed it is unlikely cause of her hair loss. Advised to see dermatology to rule out any other causes.  5.Hypercalcemia: Improved    Plan:       Restart lantus 10 units daily   Novolog SSI 2/3/4  starting at 170   Victoza   Glipizide 10mg   Continue metformin once a day    Humalog 4 units AC TID   Advise to check blood sugar 1 -2 times a day   Patient to send blood sugar log for titration.   Advise to exercise regularly. Advise to low simple carbohydrate and protein with each  meal diet.    Diabetes Care: recommend yearly eye exam, foot exam and urine microalbumin to   creatinine ratio. Patient is up-to-date.        F/u in 3 months

## 2025-03-19 NOTE — TELEPHONE ENCOUNTER
Attempted to call pt but was unsuccessful. LMOM to contact office to clarify how many units she is taking of lantus.

## 2025-03-24 ENCOUNTER — TELEPHONE (OUTPATIENT)
Dept: ENDOCRINOLOGY | Age: 80
End: 2025-03-24

## 2025-03-24 NOTE — TELEPHONE ENCOUNTER
Yes it is once a week   patient is resting in the [facility] waiting for a bed upstairs. patient denies any complaints. vss/nad. will continue to monitor.

## 2025-03-24 NOTE — TELEPHONE ENCOUNTER
Yes lantus will be fine during that time.   She can take the victoza once a week at any time when convenient for her

## 2025-03-24 NOTE — TELEPHONE ENCOUNTER
Pt called in and said she would like to take:    LANTUS SOLOSTAR 100 UNIT/ML injection pen [[id number]]    Between 12p-4p daily, is this ok?    Also when should pt take:    Liraglutide (VICTOZA) 18 MG/3ML SOPN SC injection [[id number]]    Please advise  Pt would like more of a schedule since her friend helps her

## 2025-03-25 DIAGNOSIS — E11.65 UNCONTROLLED TYPE 2 DIABETES MELLITUS WITH HYPERGLYCEMIA (HCC): ICD-10-CM

## 2025-03-25 RX ORDER — BLOOD SUGAR DIAGNOSTIC
STRIP MISCELLANEOUS
Qty: 100 STRIP | Refills: 2 | Status: SHIPPED | OUTPATIENT
Start: 2025-03-25

## 2025-03-25 RX ORDER — BLOOD-GLUCOSE METER
EACH MISCELLANEOUS
Qty: 1 KIT | Refills: 0 | Status: SHIPPED | OUTPATIENT
Start: 2025-03-25

## 2025-03-25 RX ORDER — BLOOD-GLUCOSE METER
EACH MISCELLANEOUS
COMMUNITY
End: 2025-03-25 | Stop reason: SDUPTHER

## 2025-03-25 NOTE — TELEPHONE ENCOUNTER
Pt called back and said her instructions are daily for victoza. KARIE Chahal asked Dr. Galindo again and he said she can take 1.2 daily anytime works for them. Pt understood

## 2025-03-25 NOTE — TELEPHONE ENCOUNTER
Refill is needed    blood glucose test strips (ONETOUCH VERIO) strip [[id number]]    With monitor and reader    [facility]  [address] [phone]

## 2025-03-26 DIAGNOSIS — I10 ESSENTIAL HYPERTENSION: ICD-10-CM

## 2025-03-26 DIAGNOSIS — E11.65 UNCONTROLLED TYPE 2 DIABETES MELLITUS WITH HYPERGLYCEMIA (HCC): ICD-10-CM

## 2025-03-26 RX ORDER — KETOROLAC TROMETHAMINE 30 MG/ML
INJECTION, SOLUTION INTRAMUSCULAR; INTRAVENOUS
Qty: 1 EACH | Refills: 0 | Status: SHIPPED | OUTPATIENT
Start: 2025-03-26

## 2025-03-26 RX ORDER — HYDROCHLOROTHIAZIDE 12.5 MG/1
CAPSULE ORAL
Qty: 2 EACH | Refills: 4 | Status: SHIPPED | OUTPATIENT
Start: 2025-03-26

## 2025-03-27 ENCOUNTER — OFFICE VISIT (OUTPATIENT)
Dept: PRIMARY CARE CLINIC | Age: 80
End: 2025-03-27
Payer: MEDICARE

## 2025-03-27 VITALS
WEIGHT: 129.2 LBS | OXYGEN SATURATION: 96 % | HEART RATE: 68 BPM | TEMPERATURE: 97.9 F | DIASTOLIC BLOOD PRESSURE: 62 MMHG | SYSTOLIC BLOOD PRESSURE: 100 MMHG | BODY MASS INDEX: 22.89 KG/M2

## 2025-03-27 DIAGNOSIS — N18.32 CHRONIC KIDNEY DISEASE, STAGE 3B (HCC): ICD-10-CM

## 2025-03-27 DIAGNOSIS — I10 ESSENTIAL HYPERTENSION: Primary | ICD-10-CM

## 2025-03-27 DIAGNOSIS — Z79.4 TYPE 2 DIABETES MELLITUS WITH HYPERGLYCEMIA, WITH LONG-TERM CURRENT USE OF INSULIN (HCC): ICD-10-CM

## 2025-03-27 DIAGNOSIS — E11.65 TYPE 2 DIABETES MELLITUS WITH HYPERGLYCEMIA, WITH LONG-TERM CURRENT USE OF INSULIN (HCC): ICD-10-CM

## 2025-03-27 DIAGNOSIS — G31.84 MILD COGNITIVE IMPAIRMENT: ICD-10-CM

## 2025-03-27 PROBLEM — N18.30 CHRONIC RENAL DISEASE, STAGE III (HCC): Status: RESOLVED | Noted: 2024-02-06 | Resolved: 2025-03-27

## 2025-03-27 PROCEDURE — 1036F TOBACCO NON-USER: CPT | Performed by: FAMILY MEDICINE

## 2025-03-27 PROCEDURE — G8428 CUR MEDS NOT DOCUMENT: HCPCS | Performed by: FAMILY MEDICINE

## 2025-03-27 PROCEDURE — 99214 OFFICE O/P EST MOD 30 MIN: CPT | Performed by: FAMILY MEDICINE

## 2025-03-27 PROCEDURE — 1090F PRES/ABSN URINE INCON ASSESS: CPT | Performed by: FAMILY MEDICINE

## 2025-03-27 PROCEDURE — G8400 PT W/DXA NO RESULTS DOC: HCPCS | Performed by: FAMILY MEDICINE

## 2025-03-27 PROCEDURE — G8420 CALC BMI NORM PARAMETERS: HCPCS | Performed by: FAMILY MEDICINE

## 2025-03-27 PROCEDURE — G2211 COMPLEX E/M VISIT ADD ON: HCPCS | Performed by: FAMILY MEDICINE

## 2025-03-27 PROCEDURE — 3052F HG A1C>EQUAL 8.0%<EQUAL 9.0%: CPT | Performed by: FAMILY MEDICINE

## 2025-03-27 PROCEDURE — 3078F DIAST BP <80 MM HG: CPT | Performed by: FAMILY MEDICINE

## 2025-03-27 PROCEDURE — 1123F ACP DISCUSS/DSCN MKR DOCD: CPT | Performed by: FAMILY MEDICINE

## 2025-03-27 PROCEDURE — 3074F SYST BP LT 130 MM HG: CPT | Performed by: FAMILY MEDICINE

## 2025-03-27 SDOH — ECONOMIC STABILITY: FOOD INSECURITY: WITHIN THE PAST 12 MONTHS, THE FOOD YOU BOUGHT JUST DIDN'T LAST AND YOU DIDN'T HAVE MONEY TO GET MORE.: NEVER TRUE

## 2025-03-27 SDOH — ECONOMIC STABILITY: FOOD INSECURITY: WITHIN THE PAST 12 MONTHS, YOU WORRIED THAT YOUR FOOD WOULD RUN OUT BEFORE YOU GOT MONEY TO BUY MORE.: NEVER TRUE

## 2025-03-27 ASSESSMENT — PATIENT HEALTH QUESTIONNAIRE - PHQ9
SUM OF ALL RESPONSES TO PHQ QUESTIONS 1-9: 0
2. FEELING DOWN, DEPRESSED OR HOPELESS: NOT AT ALL
SUM OF ALL RESPONSES TO PHQ QUESTIONS 1-9: 0
SUM OF ALL RESPONSES TO PHQ QUESTIONS 1-9: 0
1. LITTLE INTEREST OR PLEASURE IN DOING THINGS: NOT AT ALL
SUM OF ALL RESPONSES TO PHQ QUESTIONS 1-9: 0

## 2025-03-27 NOTE — PATIENT INSTRUCTIONS
[facility]   [address]   [address]   [place] [address] [phone]

## 2025-03-28 ENCOUNTER — TELEPHONE (OUTPATIENT)
Dept: ENDOCRINOLOGY | Age: 80
End: 2025-03-28

## 2025-03-28 NOTE — TELEPHONE ENCOUNTER
Submitted PA for Freestyle Star Curran  Via [employer] Key: [id number] STATUS: PENDING.    Follow up done daily; if no decision with in three days we will refax.  If another three days goes by with no decision will call the insurance for status.

## 2025-03-31 NOTE — TELEPHONE ENCOUNTER
The medication was DENIED; DENIAL letter is uploaded to MEDIA.      DIABETIC MEDICATION DENIALS: Drug is not covered by the plan ( Plan Exclusion)       Once complete route back to PA POOL.    If this requires a response please respond to the pool ( P [facility] MEDICATION PRE-AUTH).      Thank you please advise patient.

## 2025-04-01 ASSESSMENT — ENCOUNTER SYMPTOMS
SHORTNESS OF BREATH: 0
COUGH: 0

## 2025-04-01 NOTE — PROGRESS NOTES
[facility]  [facility]  [address] [place] [id number]  Dept: [phone]  Dept Fax: [phone]     3/27/2025      Tammy Card   1945     Chief Complaint   Patient presents with    Follow-up     Follow up        HPI    Pt comes in today for follow up. Here with son who assists in her care. HTN, DMII, CKDIII. Has noted mild cognitive impairment. Unclear it dementia or not. Has seen geriatrician but not neurology. Is scheduled in the near future. No acute concerns today. Appetite has improved since being off of the mounjaro. Following closely with Endo. Is taking her insulin consistently now. Has a home aid who assists as  has rather severe dementia and she has had worsening short term memory over the last year. She is feeling well. Eating better than usual now that son is helping out.     Wt Readings from Last 5 Encounters:   03/27/25 58.6 kg (129 lb 3.2 oz)   03/19/25 54.4 kg (120 lb)   01/14/25 53.1 kg (117 lb)   12/05/24 53.3 kg (117 lb 6.4 oz)   10/24/24 58.5 kg (129 lb)      BP Readings from Last 5 Encounters:   03/27/25 100/62   03/19/25 123/82   01/14/25 108/66   12/05/24 96/63   10/24/24 116/69       Hemoglobin A1C   Date Value Ref Range Status   03/19/2025 8.2 % Final   01/14/2025 10.4 % Final   10/08/2024 11.9 See comment % Final     Comment:     Comment:  Diagnosis of Diabetes: > or = 6.5%  Increased risk of diabetes (Prediabetes): 5.7-6.4%  Glycemic Control: Nonpregnant Adults: <7.0%                    Pregnant: <6.0%       06/10/2024 8.2 % Final   02/06/2024 7.7 % Final        No data recorded     Prior to Visit Medications    Medication Sig Taking? Authorizing Provider   Continuous Glucose  (FREESTYLE EDSON 3 READER) MARIA ELENA Daily as needed  Ilan Galindo MD   Continuous Glucose Sensor (FREESTYLE EDSON 3 PLUS SENSOR) MISC Every 2 weeks  Ilan Galindo, MD   blood glucose test strips (ONETOUCH VERIO) strip Use to check blood

## 2025-04-08 DIAGNOSIS — I10 ESSENTIAL HYPERTENSION: ICD-10-CM

## 2025-04-08 LAB
ANION GAP SERPL CALCULATED.3IONS-SCNC: 10 MMOL/L (ref 3–16)
BUN SERPL-MCNC: 24 MG/DL (ref 7–20)
CALCIUM SERPL-MCNC: 9.9 MG/DL (ref 8.3–10.6)
CHLORIDE SERPL-SCNC: 105 MMOL/L (ref 99–110)
CHOLEST SERPL-MCNC: 134 MG/DL (ref 0–199)
CO2 SERPL-SCNC: 28 MMOL/L (ref 21–32)
CREAT SERPL-MCNC: 1.1 MG/DL (ref 0.6–1.2)
GFR SERPLBLD CREATININE-BSD FMLA CKD-EPI: 52 ML/MIN/{1.73_M2}
GLUCOSE SERPL-MCNC: 144 MG/DL (ref 70–99)
HDLC SERPL-MCNC: 53 MG/DL (ref 40–60)
LDL CHOLESTEROL: 67 MG/DL
POTASSIUM SERPL-SCNC: 4.6 MMOL/L (ref 3.5–5.1)
SODIUM SERPL-SCNC: 143 MMOL/L (ref 136–145)
TRIGL SERPL-MCNC: 69 MG/DL (ref 0–150)
VLDLC SERPL CALC-MCNC: 14 MG/DL

## 2025-04-09 ENCOUNTER — TELEPHONE (OUTPATIENT)
Dept: PRIMARY CARE CLINIC | Age: 80
End: 2025-04-09

## 2025-04-09 ENCOUNTER — RESULTS FOLLOW-UP (OUTPATIENT)
Dept: PRIMARY CARE CLINIC | Age: 80
End: 2025-04-09

## 2025-04-09 DIAGNOSIS — E78.2 MIXED HYPERLIPIDEMIA: ICD-10-CM

## 2025-04-09 RX ORDER — ROSUVASTATIN CALCIUM 20 MG/1
20 TABLET, COATED ORAL EVERY EVENING
Qty: 90 TABLET | Refills: 1 | Status: SHIPPED | OUTPATIENT
Start: 2025-04-09

## 2025-04-09 NOTE — TELEPHONE ENCOUNTER
Please let son know labs look good. Cholesterol is low. We will go ahead with dose reduction of the rosuvastatin as planned. This has been sent in.

## 2025-05-22 ENCOUNTER — TELEPHONE (OUTPATIENT)
Dept: ENDOCRINOLOGY | Age: 80
End: 2025-05-22

## 2025-05-22 NOTE — TELEPHONE ENCOUNTER
Call from [facility] pharmacy stating that the pt is has questioning regarding what medications she's taking     Pt stated that she just found her victoza in the fridge from march but she never started taking it so she wasn't sure if she was supposed to be taking that along with all of her other medications     Pharmacist also stated that she is taking jardiance but wasn't sure if she was to be taking that as well    Please advise   CB# [phone] or [phone]  Son Lucas [phone]

## 2025-06-23 ENCOUNTER — OFFICE VISIT (OUTPATIENT)
Dept: ENDOCRINOLOGY | Age: 80
End: 2025-06-23
Payer: MEDICARE

## 2025-06-23 VITALS
WEIGHT: 130 LBS | DIASTOLIC BLOOD PRESSURE: 67 MMHG | SYSTOLIC BLOOD PRESSURE: 119 MMHG | OXYGEN SATURATION: 97 % | BODY MASS INDEX: 23.03 KG/M2

## 2025-06-23 DIAGNOSIS — E11.65 UNCONTROLLED TYPE 2 DIABETES MELLITUS WITH HYPERGLYCEMIA (HCC): ICD-10-CM

## 2025-06-23 LAB — HBA1C MFR BLD: 8.1 %

## 2025-06-23 PROCEDURE — 3052F HG A1C>EQUAL 8.0%<EQUAL 9.0%: CPT | Performed by: INTERNAL MEDICINE

## 2025-06-23 PROCEDURE — 83036 HEMOGLOBIN GLYCOSYLATED A1C: CPT | Performed by: INTERNAL MEDICINE

## 2025-06-23 PROCEDURE — 3074F SYST BP LT 130 MM HG: CPT | Performed by: INTERNAL MEDICINE

## 2025-06-23 PROCEDURE — 1090F PRES/ABSN URINE INCON ASSESS: CPT | Performed by: INTERNAL MEDICINE

## 2025-06-23 PROCEDURE — 1159F MED LIST DOCD IN RCRD: CPT | Performed by: INTERNAL MEDICINE

## 2025-06-23 PROCEDURE — 1036F TOBACCO NON-USER: CPT | Performed by: INTERNAL MEDICINE

## 2025-06-23 PROCEDURE — 3078F DIAST BP <80 MM HG: CPT | Performed by: INTERNAL MEDICINE

## 2025-06-23 PROCEDURE — G8427 DOCREV CUR MEDS BY ELIG CLIN: HCPCS | Performed by: INTERNAL MEDICINE

## 2025-06-23 PROCEDURE — G8420 CALC BMI NORM PARAMETERS: HCPCS | Performed by: INTERNAL MEDICINE

## 2025-06-23 PROCEDURE — G8400 PT W/DXA NO RESULTS DOC: HCPCS | Performed by: INTERNAL MEDICINE

## 2025-06-23 PROCEDURE — 1123F ACP DISCUSS/DSCN MKR DOCD: CPT | Performed by: INTERNAL MEDICINE

## 2025-06-23 PROCEDURE — 95251 CONT GLUC MNTR ANALYSIS I&R: CPT | Performed by: INTERNAL MEDICINE

## 2025-06-23 PROCEDURE — 99214 OFFICE O/P EST MOD 30 MIN: CPT | Performed by: INTERNAL MEDICINE

## 2025-06-23 PROCEDURE — G2211 COMPLEX E/M VISIT ADD ON: HCPCS | Performed by: INTERNAL MEDICINE

## 2025-06-23 RX ORDER — HYDROCHLOROTHIAZIDE 12.5 MG/1
CAPSULE ORAL
Qty: 2 EACH | Refills: 4 | Status: SHIPPED | OUTPATIENT
Start: 2025-06-23

## 2025-06-23 RX ORDER — INSULIN GLARGINE 100 [IU]/ML
INJECTION, SOLUTION SUBCUTANEOUS
Qty: 15 ML | Refills: 2 | Status: SHIPPED | OUTPATIENT
Start: 2025-06-23

## 2025-06-23 NOTE — PROGRESS NOTES
Seen as f/u      Interim:    Jardiance costly  Memory issues  Home health aide helps with insulin  Spoke to son  Will like to keep regimen simple    Did not start CGM yet  Prefers fingerstick      Takes care of       Decreasing metformin helped diarrhea  Does eat more CHO during stress   dementia  Thinks handling changes will be difficult    Diagnosed with Type 2 diabetes mellitus in 2006  Known diabetic complications: Neuropathy    Current diabetic medications   Metformin 500mg ER daily  Jardiance  25mg  lantus 10 units daily noon   Novolog SSI 2/3/4  starting at 170  Glipizide 10mg  Victoza 1.2mg daily but taking weekly    She was on mounjaro    h/o use of sulfonylurea/ TZD    8.1%<---- 8.2%<------10.4%<----11.1%<----8.2%<---- 7.7%<---- 8.0%<----8.1%<------7.3%<-----7.8%<------ 7.1%,-----7.4 %<-----7.1% <------  6.3%<------ 7.5%<------ 8.5%<------7.6%<------- 9%<------9.8 on 1/18 <--- 10.2 <--- 9.1    Prior visit with dietician: Yes   Current diet: on average, 3 meals per day   Current exercise: walking   Current monitoring regimen: home blood tests -    CGM  Average 194  47 % in range  53 % high  0% low  113-351    Any episodes of hypoglycemia? 58    No Hx of CAD , PVD, CVA    Hyperlipidemia: controlled, taking statin  LDL 36 on 1/18   crestor 40mg fenofibrate 160mg  LDL 42 on 4/19  LDL 57 on 7/20  LDL 64  Last eye exam: 3/23  Last foot exam: 1/21 by poiatrist  Last microalbumin to creatinine ratio: 1/25    HTN: Controlled, on  lisinopril 5mg    States hair loss for last 2 years    1/18 Testosterone < 3  DHEA-S 20  TSH 1.04    Calcium high in the past but normal since 2011 1/18 Ca 9.8  7/11  Ca 11.0  6/09 Ca 10.9        Past Medical History:   Diagnosis Date    Anesthesia     hx of reaction to medications can have \"opposite\" effect with sedatives and causes patient to be jittery, agitated.  Patient has concern what will be given.     Arthritis     Glaucoma     Hyperlipidemia     Hypertension

## 2025-06-24 ENCOUNTER — TELEPHONE (OUTPATIENT)
Dept: ENDOCRINOLOGY | Age: 80
End: 2025-06-24

## 2025-07-01 DIAGNOSIS — E11.65 UNCONTROLLED TYPE 2 DIABETES MELLITUS WITH HYPERGLYCEMIA (HCC): ICD-10-CM

## 2025-07-02 ENCOUNTER — TELEPHONE (OUTPATIENT)
Dept: ENDOCRINOLOGY | Age: 80
End: 2025-07-02

## 2025-07-02 DIAGNOSIS — E11.65 UNCONTROLLED TYPE 2 DIABETES MELLITUS WITH HYPERGLYCEMIA (HCC): ICD-10-CM

## 2025-07-02 RX ORDER — HYDROCHLOROTHIAZIDE 12.5 MG/1
CAPSULE ORAL
Qty: 2 EACH | Refills: 4 | OUTPATIENT
Start: 2025-07-02

## 2025-07-08 NOTE — TELEPHONE ENCOUNTER
Can we have the PA team do a prior authorization on it  
Patient's son Lucas called and stated the Freestyle Star was not covered. The box stated patient has two refills left. Patient continues to pull sensor out and he really needs to keep track of her blood sugars due to patient has a foot sore. Patient uses [employer] on the corner of [address].  [phone]   
Pt given sample  
Pt son called mom will be out of the sensors sat. And needs them asap. Pt is stating will pay out of pocket for this is the only way for the Pt son to keep track of the Pt sugar. Pt mom has memory issues that is why she sometimes pulls them off. Please advise.     
Pt was called and made aware of approval.  
Submitted PA for Freestyle Star Sensor  Via [employer] Key: [id number] STATUS: PENDING.    Follow up done daily; if no decision with in three days we will refax.  If another three days goes by with no decision will call the insurance for status.    
The medication FreeStyle Star 3 Plus Sensor is APPROVED from 7/2/2025  to 12/31/2025 .    Please notify the patient.    If this requires a response please respond to the pool ( P [facility] MEDICATION PRE-AUTH).      
Detail Level: Simple
Diagnosis Of Original Report: Basal Cell Carcinoma
Diagnosis After Review: Use Original Report Diagnosis Above

## 2025-07-10 RX ORDER — HYDROCHLOROTHIAZIDE 12.5 MG/1
CAPSULE ORAL
Qty: 2 EACH | Refills: 4 | Status: SHIPPED | OUTPATIENT
Start: 2025-07-10

## 2025-08-08 ENCOUNTER — TELEPHONE (OUTPATIENT)
Dept: ENDOCRINOLOGY | Age: 80
End: 2025-08-08

## 2025-08-12 RX ORDER — SAXAGLIPTIN 5 MG/1
5 TABLET, FILM COATED ORAL DAILY
Qty: 90 TABLET | Refills: 1 | Status: SHIPPED | OUTPATIENT
Start: 2025-08-12

## 2025-08-23 DIAGNOSIS — E11.65 UNCONTROLLED TYPE 2 DIABETES MELLITUS WITH HYPERGLYCEMIA (HCC): ICD-10-CM

## 2025-08-25 RX ORDER — LISINOPRIL 5 MG/1
5 TABLET ORAL DAILY
Qty: 90 TABLET | Refills: 0 | Status: SHIPPED | OUTPATIENT
Start: 2025-08-25

## 2025-08-25 RX ORDER — GLIPIZIDE 10 MG/1
10 TABLET, FILM COATED, EXTENDED RELEASE ORAL DAILY
Qty: 90 TABLET | Refills: 3 | Status: SHIPPED | OUTPATIENT
Start: 2025-08-25